# Patient Record
Sex: MALE | Race: WHITE | Employment: OTHER | URBAN - METROPOLITAN AREA
[De-identification: names, ages, dates, MRNs, and addresses within clinical notes are randomized per-mention and may not be internally consistent; named-entity substitution may affect disease eponyms.]

---

## 2017-05-30 ENCOUNTER — OFFICE VISIT (OUTPATIENT)
Dept: FAMILY MEDICINE CLINIC | Age: 72
End: 2017-05-30

## 2017-05-30 VITALS
SYSTOLIC BLOOD PRESSURE: 144 MMHG | TEMPERATURE: 98.8 F | HEART RATE: 78 BPM | HEIGHT: 69 IN | BODY MASS INDEX: 27.96 KG/M2 | DIASTOLIC BLOOD PRESSURE: 80 MMHG | OXYGEN SATURATION: 98 % | WEIGHT: 188.8 LBS | RESPIRATION RATE: 10 BRPM

## 2017-05-30 DIAGNOSIS — G89.29 CHRONIC LEFT SHOULDER PAIN: ICD-10-CM

## 2017-05-30 DIAGNOSIS — M25.512 CHRONIC LEFT SHOULDER PAIN: ICD-10-CM

## 2017-05-30 DIAGNOSIS — E78.5 HYPERLIPIDEMIA, UNSPECIFIED HYPERLIPIDEMIA TYPE: ICD-10-CM

## 2017-05-30 DIAGNOSIS — I10 ESSENTIAL HYPERTENSION: ICD-10-CM

## 2017-05-30 DIAGNOSIS — E11.9 CONTROLLED TYPE 2 DIABETES MELLITUS WITHOUT COMPLICATION, WITHOUT LONG-TERM CURRENT USE OF INSULIN (HCC): Primary | ICD-10-CM

## 2017-05-30 RX ORDER — FENOFIBRATE 160 MG/1
TABLET ORAL
Refills: 0 | COMMUNITY
Start: 2017-05-24 | End: 2017-10-11 | Stop reason: SDUPTHER

## 2017-05-30 RX ORDER — ALPRAZOLAM 0.5 MG/1
TABLET ORAL
Refills: 1 | COMMUNITY
Start: 2017-05-09 | End: 2017-06-13 | Stop reason: SDUPTHER

## 2017-05-30 RX ORDER — ATENOLOL 100 MG/1
TABLET ORAL
Refills: 1 | COMMUNITY
Start: 2017-04-21 | End: 2017-11-07 | Stop reason: SDUPTHER

## 2017-05-30 RX ORDER — EZETIMIBE AND SIMVASTATIN 10; 40 MG/1; MG/1
1 TABLET ORAL
COMMUNITY
End: 2017-07-31 | Stop reason: ALTCHOICE

## 2017-05-30 RX ORDER — GLIPIZIDE 5 MG/1
TABLET, FILM COATED, EXTENDED RELEASE ORAL
Refills: 0 | COMMUNITY
Start: 2017-04-11 | End: 2017-06-13 | Stop reason: ALTCHOICE

## 2017-05-30 RX ORDER — OXYCODONE HYDROCHLORIDE 5 MG/1
TABLET ORAL
Refills: 0 | COMMUNITY
Start: 2017-03-16 | End: 2018-02-20

## 2017-05-30 RX ORDER — FENOFIBRIC ACID 135 MG/1
CAPSULE, DELAYED RELEASE ORAL DAILY
COMMUNITY
End: 2017-06-13 | Stop reason: DRUGHIGH

## 2017-05-30 RX ORDER — GLIPIZIDE 10 MG/1
TABLET, FILM COATED, EXTENDED RELEASE ORAL
Refills: 0 | COMMUNITY
Start: 2017-05-16 | End: 2017-11-07 | Stop reason: SDUPTHER

## 2017-05-30 RX ORDER — CEFUROXIME AXETIL 500 MG/1
TABLET ORAL 2 TIMES DAILY
COMMUNITY
End: 2017-06-13 | Stop reason: ALTCHOICE

## 2017-05-30 RX ORDER — VENLAFAXINE HYDROCHLORIDE 150 MG/1
CAPSULE, EXTENDED RELEASE ORAL
Refills: 1 | COMMUNITY
Start: 2017-03-17 | End: 2017-06-12 | Stop reason: SDUPTHER

## 2017-05-30 RX ORDER — SIMVASTATIN 40 MG/1
TABLET, FILM COATED ORAL
Refills: 0 | COMMUNITY
Start: 2017-04-08 | End: 2017-10-11 | Stop reason: SDUPTHER

## 2017-05-30 RX ORDER — ESCITALOPRAM OXALATE 10 MG/1
10 TABLET ORAL DAILY
COMMUNITY
End: 2017-06-12 | Stop reason: ALTCHOICE

## 2017-05-30 RX ORDER — BENAZEPRIL HYDROCHLORIDE 40 MG/1
TABLET ORAL
Refills: 1 | COMMUNITY
Start: 2017-03-20 | End: 2018-01-19 | Stop reason: SDUPTHER

## 2017-05-30 NOTE — MR AVS SNAPSHOT
Visit Information Date & Time Provider Department Dept. Phone Encounter #  
 5/30/2017  2:30 PM Shaheen Banks MD [facility] [phone] [id number] Follow-up Instructions Return in about 3 months (around 8/30/2017), or if symptoms worsen or fail to improve. Upcoming Health Maintenance Date Due Hepatitis C Screening 1945 DTaP/Tdap/Td series (1 - Tdap) 1/3/1968 FOBT Q 1 YEAR AGE 50-75 1/3/1997 ZOSTER VACCINE AGE 60> 1/3/2007 GLAUCOMA SCREENING Q2Y 1/3/2012 Pneumococcal 65+ Low/Medium Risk (1 of 2 - PCV13) 1/3/2012 MEDICARE YEARLY EXAM 1/3/2012 INFLUENZA AGE 9 TO ADULT 8/1/2017 Allergies as of 5/30/2017  Review Complete On: 5/30/2017 By: Shaheen Banks MD  
 No Known Allergies Current Immunizations  Never Reviewed No immunizations on file. Not reviewed this visit You Were Diagnosed With   
  
 Codes Comments Controlled type 2 diabetes mellitus without complication, without long-term current use of insulin ([facility] [address]    -  Primary ICD-10-CM: E11.9 ICD-9-CM: 250.00 Hyperlipidemia, unspecified hyperlipidemia type     ICD-10-CM: E78.5 ICD-9-CM: 272.4 Essential hypertension     ICD-10-CM: I10 
ICD-9-CM: 401.9 Chronic left shoulder pain     ICD-10-CM: M25.512, G89.29 ICD-9-CM: 719.41, 338.29 Vitals BP Pulse Temp Resp Height(growth percentile) Weight(growth percentile) 144/80 (BP 1 Location: Left arm, BP Patient Position: Sitting) 78 98.8 °F (37.1 °C) (Oral) 10 5' 8.5\" (1.74 m) 188 lb 12.8 oz (85.6 kg) SpO2 BMI Smoking Status 98% 28.29 kg/m2 Current Every Day Smoker Vitals History BMI and BSA Data Body Mass Index Body Surface Area  
 28.29 kg/m 2 2.03 m 2 Preferred Pharmacy Pharmacy Name Phone [phone] [place], [address] [place] [address] AT [address] [place] [address] [phone] Your Updated Medication List  
  
 This list is accurate as of: 5/30/17  3:08 PM.  Always use your most recent med list.  
  
  
  
  
 ALPRAZolam 0.5 mg tablet Commonly known as:  Maple Cherry TK 1 T PO Q 6 H PRN  
  
 atenolol 100 mg tablet Commonly known as:  TENORMIN  
TK 1 T PO QD.  
  
 benazepril 40 mg tablet Commonly known as:  LOTENSIN  
TK 1 T PO QD.  
  
 cefUROXime 500 mg tablet Commonly known as:  CEFTIN Take  by mouth two (2) times a day. escitalopram oxalate 10 mg tablet Commonly known as:  Maudie Phoenix Take 10 mg by mouth daily. fenofibrate 160 mg tablet Commonly known as:  LOFIBRA TK 1 T PO QD WITH A MEAL  
  
 fenofibric acid 135 mg capsule Commonly known as:  TRILIPIX ER Take  by mouth daily. * glipiZIDE SR 5 mg CR tablet Commonly known as:  GLUCOTROL XL  
TK 1 T PO QD  
  
 * glipiZIDE SR 10 mg CR tablet Commonly known as:  GLUCOTROL XL  
TK 1 T PO QD  
  
 JANUMET XR 50-1,000 mg Tm24 Generic drug:  SITagliptin-metFORMIN Take  by mouth. oxyCODONE IR 5 mg immediate release tablet Commonly known as:  ROXICODONE  
TK 1 T PO  TID FOR 30 DAYS  
  
 simvastatin 40 mg tablet Commonly known as:  ZOCOR TK 1 T PO QD IN THE PRASHANT  
  
 venlafaxine- mg capsule Commonly known as:  EFFEXOR-XR TK 1 C PO QD IN THE PRASHANT WF. Vytorin 10/40 10-40 mg per tablet Generic drug:  ezetimibe-simvastatin Take 1 Tab by mouth nightly. * Notice: This list has 2 medication(s) that are the same as other medications prescribed for you. Read the directions carefully, and ask your doctor or other care provider to review them with you. We Performed the Following REFERRAL TO PAIN MANAGEMENT [PIJ373 Custom] Comments:  
 Please evaluate patient for chronic shoulder pain. Follow-up Instructions Return in about 3 months (around 8/30/2017), or if symptoms worsen or fail to improve. Referral Information Referral ID Referred By Referred To [id number] [place] Not Available Visits Status Start Date End Date 1 New Request 5/30/17 5/30/18 If your referral has a status of pending review or denied, additional information will be sent to support the outcome of this decision. Patient Instructions High Cholesterol: Care Instructions Your Care Instructions Cholesterol is a type of fat in your blood. It is needed for many body functions, such as making new cells. Cholesterol is made by your body. It also comes from food you eat. High cholesterol means that you have too much of the fat in your blood. This raises your risk of a heart attack and stroke. LDL and HDL are part of your total cholesterol. LDL is the \"bad\" cholesterol. High LDL can raise your risk for heart disease, heart attack, and stroke. HDL is the \"good\" cholesterol. It helps clear bad cholesterol from the body. High HDL is linked with a lower risk of heart disease, heart attack, and stroke. Your cholesterol levels help your doctor find out your risk for having a heart attack or stroke. You and your doctor can talk about whether you need to lower your risk and what treatment is best for you. A heart-healthy lifestyle along with medicines can help lower your cholesterol and your risk. The way you choose to lower your risk will depend on how high your risk is for heart attack and stroke. It will also depend on how you feel about taking medicines. Follow-up care is a key part of your treatment and safety. Be sure to make and go to all appointments, and call your doctor if you are having problems. It's also a good idea to know your test results and keep a list of the medicines you take. How can you care for yourself at home? · Eat a variety of foods every day. Good choices include fruits, vegetables, whole grains (like oatmeal), dried beans and peas, nuts and seeds, soy products (like tofu), and fat-free or low-fat dairy products. · Replace butter, margarine, and hydrogenated or partially hydrogenated oils with olive and canola oils. (Canola oil margarine without trans fat is fine.) · Replace red meat with fish, poultry, and soy protein (like tofu). · Limit processed and packaged foods like chips, crackers, and cookies. · Bake, broil, or steam foods. Don't jaimes them. · Be physically active. Get at least 30 minutes of exercise on most days of the week. Walking is a good choice. You also may want to do other activities, such as running, swimming, cycling, or playing tennis or team sports. · Stay at a healthy weight or lose weight by making the changes in eating and physical activity listed above. Losing just a small amount of weight, even 5 to 10 pounds, can reduce your risk for having a heart attack or stroke. · Do not smoke. When should you call for help? Watch closely for changes in your health, and be sure to contact your doctor if: 
· You need help making lifestyle changes. · You have questions about your medicine. Where can you learn more? Go to http://dariuszCrowdOpticstanley.info/. Enter G619 in the search box to learn more about \"High Cholesterol: Care Instructions. \" Current as of: January 27, 2016 Content Version: 11.2 © 4173-1453 [employer]. Care instructions adapted under license by [employer] (which disclaims liability or warranty for this information). If you have questions about a medical condition or this instruction, always ask your healthcare professional. [place] [address] any warranty or liability for your use of this information. Introducing [facility]! [employer] introduces [employer] patient portal. Now you can access parts of your medical record, email your doctor's office, and request medication refills online. 1. In your internet browser, go to [email or web address] 2. Click on the First Time User? Click Here link in the Sign In box. You will see the New Member Sign Up page. 3. Enter your [employer] Access Code exactly as it appears below. You will not need to use this code after youve completed the sign-up process. If you do not sign up before the expiration date, you must request a new code. · [employer] Access Code: [email or web address] Expires: 8/28/2017 10:24 AM 
 
4. Enter the last four digits of your Social Security Number (xxxx) and Date of Birth (mm/dd/yyyy) as indicated and click Submit. You will be taken to the next sign-up page. 5. Create a [employer] ID. This will be your [employer] login ID and cannot be changed, so think of one that is secure and easy to remember. 6. Create a [employer] password. You can change your password at any time. 7. Enter your Password Reset Question and Answer. This can be used at a later time if you forget your password. 8. Enter your e-mail address. You will receive e-mail notification when new information is available in [address] 9. Click Sign Up. You can now view and download portions of your medical record. 10. Click the Download Summary menu link to download a portable copy of your medical information. If you have questions, please visit the Frequently Asked Questions section of the [employer] website. Remember, [employer] is NOT to be used for urgent needs. For medical emergencies, dial 911. Now available from your iPhone and Android! Please provide this summary of care documentation to your next provider. If you have any questions after today's visit, please call [phone].

## 2017-05-30 NOTE — PROGRESS NOTES
Ed Ill is a 81 y.o. male  presents for establish care. No new sxs. No chest pains or SOB weakness or numbness. No Known Allergies    There is no problem list on file for this patient. Past Medical History:   Diagnosis Date    Depression     Diabetes ([address]     Hypercholesterolemia     Hypertension      Social History     Social History    Marital status:      Spouse name: N/A    Number of children: N/A    Years of education: N/A     Social History Main Topics    Smoking status: Current Every Day Smoker     Packs/day: 0.50    Smokeless tobacco: None    Alcohol use No    Drug use: None    Sexual activity: Not Asked     Other Topics Concern    None     Social History Narrative    None     Family History   Problem Relation Age of Onset    Emphysema Mother     Hypertension Father     Heart Attack Father     Emphysema Paternal Grandmother         Review of Systems   Constitutional: Negative for chills and fever. Respiratory: Negative for cough and hemoptysis. Cardiovascular: Negative for chest pain and palpitations. Gastrointestinal: Negative for constipation, diarrhea, nausea and vomiting. Neurological: Negative for headaches. Vitals:    05/30/17 1433   BP: 144/80   Pulse: 78   Resp: 10   Temp: 98.8 °F (37.1 °C)   TempSrc: Oral   SpO2: 98%   Weight: 188 lb 12.8 oz (85.6 kg)   Height: 5' 8.5\" (1.74 m)   PainSc:   5   PainLoc: Generalized       Physical Exam   Constitutional: He is oriented to person, place, and time and well-developed, well-nourished, and in no distress. Neck: Normal range of motion. Neck supple. Cardiovascular: Normal rate, regular rhythm and normal heart sounds. Pulmonary/Chest: Effort normal and breath sounds normal.   Neurological: He is alert and oriented to person, place, and time. Skin: Skin is warm and dry. Nursing note and vitals reviewed. Assessment/Plan      ICD-10-CM ICD-9-CM    1.  Controlled type 2 diabetes mellitus without complication, without long-term current use of insulin (HCC) E11.9 250.00    2. Hyperlipidemia, unspecified hyperlipidemia type E78.5 272.4    3. Essential hypertension I10 401.9    4. Chronic left shoulder pain M25.512 719.41 CANCELED: REFERRAL TO PAIN MANAGEMENT    G89.29 338.29      I have discussed the diagnosis with the patient and the intended plan of care as seen in the above orders. The patient has received an after-visit summary and questions were answered concerning future plans. I have discussed medication, side effects, and warnings with the patient in detail. The patient verbalized understanding and is in agreement with the plan of care. The patient will contact the office with any additional concerns.       Follow-up Disposition:  Return in about 3 months (around 8/30/2017), or if symptoms worsen or fail to improve.  lab results and schedule of future lab studies reviewed with patient    Mami Castillo MD

## 2017-05-30 NOTE — PATIENT INSTRUCTIONS

## 2017-06-12 RX ORDER — VENLAFAXINE HYDROCHLORIDE 150 MG/1
CAPSULE, EXTENDED RELEASE ORAL
Qty: 20 CAP | Refills: 1 | Status: SHIPPED | OUTPATIENT
Start: 2017-06-12 | End: 2017-06-12 | Stop reason: SDUPTHER

## 2017-06-12 RX ORDER — ALPRAZOLAM 0.5 MG/1
TABLET ORAL
Refills: 1 | OUTPATIENT
Start: 2017-06-12

## 2017-06-12 NOTE — TELEPHONE ENCOUNTER
This patient contacted office for the following prescriptions to be filled:    Medication requested :   Requested Prescriptions     Pending Prescriptions Disp Refills    venlafaxine-SR (EFFEXOR-XR) 150 mg capsule  1    ALPRAZolam (XANAX) 0.5 mg tablet  1     PCP: Dr. Tyler Schilling or Print: [facility]  Mail order or Local pharmacy: [phone]    Scheduled appointment if not seen by current providers in office: LOV 5/30/17

## 2017-06-13 ENCOUNTER — OFFICE VISIT (OUTPATIENT)
Dept: FAMILY MEDICINE CLINIC | Age: 72
End: 2017-06-13

## 2017-06-13 VITALS
BODY MASS INDEX: 29.18 KG/M2 | HEIGHT: 69 IN | SYSTOLIC BLOOD PRESSURE: 130 MMHG | OXYGEN SATURATION: 90 % | RESPIRATION RATE: 12 BRPM | WEIGHT: 197 LBS | DIASTOLIC BLOOD PRESSURE: 82 MMHG | HEART RATE: 86 BPM | TEMPERATURE: 98.1 F

## 2017-06-13 DIAGNOSIS — Z02.89 PAIN MANAGEMENT CONTRACT AGREEMENT: ICD-10-CM

## 2017-06-13 DIAGNOSIS — F41.9 ANXIETY: Primary | ICD-10-CM

## 2017-06-13 RX ORDER — ALPRAZOLAM 0.5 MG/1
TABLET ORAL
Qty: 60 TAB | Refills: 0 | Status: SHIPPED | OUTPATIENT
Start: 2017-06-13 | End: 2017-07-07 | Stop reason: SDUPTHER

## 2017-06-13 RX ORDER — ASPIRIN 81 MG/1
TABLET ORAL DAILY
COMMUNITY

## 2017-06-13 NOTE — PROGRESS NOTES
Yifan Mayorga is a 81 y.o. male  presents for refills of meds. No new complaints. HPI:   Patient has anxiety. Pain control:           Current : stable   0/10           Worst pain over last week        0/10           Least pain over the last week   0/10    Activities of Daily Living:            stable            Are you able to do your normal daily activities?   no    Patient Goals            Functional:  yes            Pain: none  Is patiently on narcotic medicine? If yes, which medication? Yes: Comment: xanax    Side effects: Since starting your pain medicine are you experiencing any of the following? (Examples: Constipation, fatigue, lapses in memory, depression or sexual dysfunction)     No    Known Aberrant behaviors:  No (Early refill requests, lost prescriptions, lost medicine)    Pill count: Consistent with patient's prescriptions? none    Last urine drug screen:  none    [place] Prescription Monitoring Program check performed: Yes    Any inconsistencies in patient reported and pharmacy report prescriptions? No  -------------------------------------------------------------------------------------------------------------------  If patient is not currently on narcotic medication and you are planning on initiating narcotics, [address] [place] [address] requires that you address each of the following:    History of narcotic use in the past? No    History of patient reported substance abuse? No    Social History     Social History    Marital status:      Spouse name: N/A    Number of children: N/A    Years of education: N/A     Occupational History    Not on file.      Social History Main Topics    Smoking status: Current Every Day Smoker     Packs/day: 0.50    Smokeless tobacco: Not on file    Alcohol use No    Drug use: Not on file    Sexual activity: Not on file     Other Topics Concern    Not on file     Social History Narrative       Is the Narcotic Contract Signed and Scanned into the chart? Yes      Treatment Care Team  Patient Care Team:  Armen Baez MD as PCP - [facility])    Follow up contact scheduled for 1-4 weeks: IIf not, list reason: Yes    No Known Allergies  Outpatient Prescriptions Marked as Taking for the 6/13/17 encounter (Office Visit) with Armen Baze MD   Medication Sig Dispense Refill    aspirin delayed-release 81 mg tablet Take  by mouth daily.  venlafaxine-SR (EFFEXOR-XR) 150 mg capsule TK 1 C PO QD IN THE PRASHANT WF. 20 Cap 1    atenolol (TENORMIN) 100 mg tablet TK 1 T PO QD.  1    benazepril (LOTENSIN) 40 mg tablet TK 1 T PO QD.  1    fenofibrate (LOFIBRA) 160 mg tablet TK 1 T PO QD WITH A MEAL  0    glipiZIDE SR (GLUCOTROL XL) 10 mg CR tablet TK 1 T PO QD  0    simvastatin (ZOCOR) 40 mg tablet TK 1 T PO QD IN THE PRASHANT  0    SITagliptin-metFORMIN (JANUMET XR) 50-1,000 mg TM24 Take  by mouth.  ezetimibe-simvastatin (VYTORIN 10/40) 10-40 mg per tablet Take 1 Tab by mouth nightly.        Patient Active Problem List   Diagnosis Code    Essential hypertension I10    Controlled type 2 diabetes mellitus without complication, without long-term current use of insulin ([facility]) E11.9    Hyperlipidemia E78.5    Chronic left shoulder pain M25.512, G89.29     Past Medical History:   Diagnosis Date    Depression     Diabetes ([facility] [address]     Hypercholesterolemia     Hypertension      Social History     Social History    Marital status:      Spouse name: N/A    Number of children: N/A    Years of education: N/A     Social History Main Topics    Smoking status: Current Every Day Smoker     Packs/day: 0.50    Smokeless tobacco: None    Alcohol use No    Drug use: None    Sexual activity: Not Asked     Other Topics Concern    None     Social History Narrative     Family History   Problem Relation Age of Onset    Emphysema Mother     Hypertension Father     Heart Attack Father     Emphysema Paternal Grandmother         Review of Systems Constitutional: Negative for chills and fever. Gastrointestinal: Negative for nausea and vomiting. Psychiatric/Behavioral: Negative for depression, hallucinations, memory loss, substance abuse and suicidal ideas. The patient is nervous/anxious. The patient does not have insomnia. Vitals:    06/13/17 1411   BP: 130/82   Pulse: 86   Resp: 12   Temp: 98.1 °F (36.7 °C)   TempSrc: Oral   SpO2: 90%   Weight: 197 lb (89.4 kg)   Height: 5' 8.5\" (1.74 m)   PainSc:   0 - No pain       Physical Exam   Constitutional: He is oriented to person, place, and time and well-developed, well-nourished, and in no distress. Cardiovascular: Normal rate and regular rhythm. Neurological: He is alert and oriented to person, place, and time. Gait normal.   Skin: Skin is warm and dry. Psychiatric: Mood, memory, affect and judgment normal.   Nursing note and vitals reviewed. Assessment/Plan      ICD-10-CM ICD-9-CM    1. Anxiety F41.9 300.00 ALPRAZolam (XANAX) 0.5 mg tablet   2.  Pain management contract agreement Z02.89 V68.89      Follow-up Disposition: Not on File  lab results and schedule of future lab studies reviewed with patient    Sonja Bennett MD

## 2017-06-13 NOTE — MR AVS SNAPSHOT
Visit Information Date & Time Provider Department Dept. Phone Encounter #  
 6/13/2017  2:00 PM Chance Cazares MD [facility] [phone] [id number] Follow-up Instructions Return in about 1 month (around 7/13/2017), or if symptoms worsen or fail to improve. Your Appointments 8/29/2017  1:30 PM  
Follow Up with Chance Cazares MD  
[facility] [address]) Appt Note: 3mo f/u  
 [address] [facility] [address] [place] [address]  
[phone]  
  
   
 [facility] [address] [place] [address] Upcoming Health Maintenance Date Due Hepatitis C Screening 1945 HEMOGLOBIN A1C Q6M 1945 LIPID PANEL Q1 1945 FOOT EXAM Q1 1/3/1957 MICROALBUMIN Q1 1/3/1957 EYE EXAM RETINAL OR DILATED Q1 1/3/1957 DTaP/Tdap/Td series (1 - Tdap) 1/3/1968 FOBT Q 1 YEAR AGE 50-75 1/3/1997 ZOSTER VACCINE AGE 60> 1/3/2007 GLAUCOMA SCREENING Q2Y 1/3/2012 Pneumococcal 65+ Low/Medium Risk (1 of 2 - PCV13) 1/3/2012 MEDICARE YEARLY EXAM 1/3/2012 INFLUENZA AGE 9 TO ADULT 8/1/2017 Allergies as of 6/13/2017  Review Complete On: 6/13/2017 By: Chance Cazares MD  
 No Known Allergies Current Immunizations  Never Reviewed No immunizations on file. Not reviewed this visit You Were Diagnosed With   
  
 Codes Comments Anxiety    -  Primary ICD-10-CM: F41.9 ICD-9-CM: 300.00 Pain management contract agreement     ICD-10-CM: Z02.89 ICD-9-CM: V68.89 Vitals BP Pulse Temp Resp Height(growth percentile) Weight(growth percentile) 130/82 (BP 1 Location: Right arm, BP Patient Position: Sitting) 86 98.1 °F (36.7 °C) (Oral) 12 5' 8.5\" (1.74 m) 197 lb (89.4 kg) SpO2 BMI Smoking Status 90% 29.52 kg/m2 Current Every Day Smoker Vitals History BMI and BSA Data  Body Mass Index Body Surface Area  
 29.52 kg/m 2 2.08 m 2  
  
  
 Preferred Pharmacy Pharmacy Name Phone [facility] [id number] - [place], [address] [facility] AT [address] [place] [address] [phone] Your Updated Medication List  
  
   
This list is accurate as of: 6/13/17  2:31 PM.  Always use your most recent med list.  
  
  
  
  
 ALPRAZolam 0.5 mg tablet Commonly known as:  Toy Moment TK 1 T PO Q 6 H PRN  Indications: anxiety  
  
 aspirin delayed-release 81 mg tablet Take  by mouth daily. atenolol 100 mg tablet Commonly known as:  TENORMIN  
TK 1 T PO QD.  
  
 benazepril 40 mg tablet Commonly known as:  LOTENSIN  
TK 1 T PO QD.  
  
 fenofibrate 160 mg tablet Commonly known as:  LOFIBRA TK 1 T PO QD WITH A MEAL  
  
 glipiZIDE SR 10 mg CR tablet Commonly known as:  GLUCOTROL XL  
TK 1 T PO QD  
  
 JANUMET XR 50-1,000 mg Tm24 Generic drug:  SITagliptin-metFORMIN Take  by mouth. oxyCODONE IR 5 mg immediate release tablet Commonly known as:  ROXICODONE  
TK 1 T PO  TID FOR 30 DAYS  
  
 simvastatin 40 mg tablet Commonly known as:  ZOCOR TK 1 T PO QD IN THE PRASHANT  
  
 venlafaxine- mg capsule Commonly known as:  EFFEXOR-XR TK 1 C PO QD IN THE PRASHANT WF. Vytorin 10/40 10-40 mg per tablet Generic drug:  ezetimibe-simvastatin Take 1 Tab by mouth nightly. Prescriptions Printed Refills ALPRAZolam (XANAX) 0.5 mg tablet 0 Sig: TK 1 T PO Q 6 H PRN  Indications: anxiety Class: Print Follow-up Instructions Return in about 1 month (around 7/13/2017), or if symptoms worsen or fail to improve. Patient Instructions Anxiety Disorder: Care Instructions Your Care Instructions Anxiety is a normal reaction to stress. Difficult situations can cause you to have symptoms such as sweaty palms and a nervous feeling. In an anxiety disorder, the symptoms are far more severe.  Constant worry, muscle tension, trouble sleeping, nausea and diarrhea, and other symptoms can make normal daily activities difficult or impossible. These symptoms may occur for no reason, and they can affect your work, school, or social life. Medicines, counseling, and self-care can all help. Follow-up care is a key part of your treatment and safety. Be sure to make and go to all appointments, and call your doctor if you are having problems. It's also a good idea to know your test results and keep a list of the medicines you take. How can you care for yourself at home? · Take medicines exactly as directed. Call your doctor if you think you are having a problem with your medicine. · Go to your counseling sessions and follow-up appointments. · Recognize and accept your anxiety. Then, when you are in a situation that makes you anxious, say to yourself, \"This is not an emergency. I feel uncomfortable, but I am not in danger. I can keep going even if I feel anxious. \" · Be kind to your body: ¨ Relieve tension with exercise or a massage. ¨ Get enough rest. 
¨ Avoid alcohol, caffeine, nicotine, and illegal drugs. They can increase your anxiety level and cause sleep problems. ¨ Learn and do relaxation techniques. See below for more about these techniques. · Engage your mind. Get out and do something you enjoy. Go to a funny movie, or take a walk or hike. Plan your day. Having too much or too little to do can make you anxious. · Keep a record of your symptoms. Discuss your fears with a good friend or family member, or join a support group for people with similar problems. Talking to others sometimes relieves stress. · Get involved in social groups, or volunteer to help others. Being alone sometimes makes things seem worse than they are. · Get at least 30 minutes of exercise on most days of the week to relieve stress. Walking is a good choice.  You also may want to do other activities, such as running, swimming, cycling, or playing tennis or team sports. Relaxation techniques Do relaxation exercises 10 to 20 minutes a day. You can play soothing, relaxing music while you do them, if you wish. · Tell others in your house that you are going to do your relaxation exercises. Ask them not to disturb you. · Find a comfortable place, away from all distractions and noise. · Lie down on your back, or sit with your back straight. · Focus on your breathing. Make it slow and steady. · Breathe in through your nose. Breathe out through either your nose or mouth. · Breathe deeply, filling up the area between your navel and your rib cage. Breathe so that your belly goes up and down. · Do not hold your breath. · Breathe like this for 5 to 10 minutes. Notice the feeling of calmness throughout your whole body. As you continue to breathe slowly and deeply, relax by doing the following for another 5 to 10 minutes: · Tighten and relax each muscle group in your body. You can begin at your toes and work your way up to your head. · Imagine your muscle groups relaxing and becoming heavy. · Empty your mind of all thoughts. · Let yourself relax more and more deeply. · Become aware of the state of calmness that surrounds you. · When your relaxation time is over, you can bring yourself back to alertness by moving your fingers and toes and then your hands and feet and then stretching and moving your entire body. Sometimes people fall asleep during relaxation, but they usually wake up shortly afterward. · Always give yourself time to return to full alertness before you drive a car or do anything that might cause an accident if you are not fully alert. Never play a relaxation tape while you drive a car. When should you call for help? Call 911 anytime you think you may need emergency care. For example, call if: 
· You feel you cannot stop from hurting yourself or someone else. Keep the numbers for these national suicide hotlines: 7-480-185-TALK (9-977.546.9960) and 7-496-ABMUXDL (9-449.775.4128). If you or someone you know talks about suicide or feeling hopeless, get help right away. Watch closely for changes in your health, and be sure to contact your doctor if: 
· You have anxiety or fear that affects your life. · You have symptoms of anxiety that are new or different from those you had before. Where can you learn more? Go to http://dariusz-stanley.info/. Enter P754 in the search box to learn more about \"Anxiety Disorder: Care Instructions. \" Current as of: July 26, 2016 Content Version: 11.2 © 1856-2604 [employer]. Care instructions adapted under license by NewsiT (which disclaims liability or warranty for this information). If you have questions about a medical condition or this instruction, always ask your healthcare professional. [place] [address] any warranty or liability for your use of this information. Introducing [facility]! Dear Shar Puentes: Thank you for requesting a Complix account. Our records indicate that you already have an active Complix account. You can access your account anytime at [email or web address] Did you know that you can access your hospital and ER discharge instructions at any time in Complix? You can also review all of your test results from your hospital stay or ER visit. Additional Information If you have questions, please visit the Frequently Asked Questions section of the Complix website at [email or web address] Remember, Complix is NOT to be used for urgent needs. For medical emergencies, dial 911. Now available from your iPhone and Android! Please provide this summary of care documentation to your next provider. Your primary care clinician is listed as [address].  If you have any questions after today's visit, please call [phone].

## 2017-06-13 NOTE — PROGRESS NOTES
Patient here for refills on his Xanax. 1. Have you been to the ER, urgent care clinic since your last visit? Hospitalized since your last visit? No    2. Have you seen or consulted any other health care providers outside of the [facility] [place] [facility]\"" since your last visit? Include any pap smears or colon screening.  No

## 2017-06-21 RX ORDER — VENLAFAXINE HYDROCHLORIDE 150 MG/1
CAPSULE, EXTENDED RELEASE ORAL
Qty: 90 CAP | Refills: 1 | Status: SHIPPED | OUTPATIENT
Start: 2017-06-21 | End: 2017-09-05 | Stop reason: SDUPTHER

## 2017-07-05 DIAGNOSIS — F41.9 ANXIETY: ICD-10-CM

## 2017-07-05 RX ORDER — ALPRAZOLAM 0.5 MG/1
TABLET ORAL
Refills: 0 | OUTPATIENT
Start: 2017-07-05

## 2017-07-05 NOTE — TELEPHONE ENCOUNTER
And it should say three times a day if needed with enough pills. .. 90 per the patient. Please contact to verify.

## 2017-07-05 NOTE — TELEPHONE ENCOUNTER
This patient contacted office for the following prescriptions to be filled:    Medication requested :   Requested Prescriptions     Pending Prescriptions Disp Refills    ALPRAZolam (XANAX) 0.5 mg tablet 60 Tab 0     Sig: TK 1 T PO Q 6 H PRN  Indications: anxiety       PCP: Dr. Salome Moreno MD  Pharmacy or Print: Print  Mail order or Local pharmacy: Local    Scheduled appointment if not seen by current providers in office: last appointment was 6/13/17, scheduled to be seen on 7/13/17.

## 2017-07-06 NOTE — TELEPHONE ENCOUNTER
Pt came into office today. He states that he only has 2 days left. When he was at his last appt he states that he spoke with dr Johnny Serrano and told him that he takes the pills '2-3 times a day' and two doctors have agreed that he 'should be on it for panic attacks. And we don't want me to have one of those. I know I don't want to have one'. Currently the script is only for 2 days. He needs a script to be written for 10 pills to last him until he can fill it next week and then a script to reflect his 3 pills a day. I let patient know that dr Johnny Serrano had left the office for lunch. I could send a message to dr Johnny Serrano and give him a call once he responds.

## 2017-07-07 ENCOUNTER — OFFICE VISIT (OUTPATIENT)
Dept: FAMILY MEDICINE CLINIC | Age: 72
End: 2017-07-07

## 2017-07-07 VITALS
DIASTOLIC BLOOD PRESSURE: 82 MMHG | TEMPERATURE: 97.8 F | WEIGHT: 199.2 LBS | OXYGEN SATURATION: 98 % | RESPIRATION RATE: 12 BRPM | HEART RATE: 78 BPM | SYSTOLIC BLOOD PRESSURE: 128 MMHG | HEIGHT: 69 IN | BODY MASS INDEX: 29.51 KG/M2

## 2017-07-07 DIAGNOSIS — F32.A ANXIETY AND DEPRESSION: Primary | ICD-10-CM

## 2017-07-07 DIAGNOSIS — E11.9 CONTROLLED TYPE 2 DIABETES MELLITUS WITHOUT COMPLICATION, WITHOUT LONG-TERM CURRENT USE OF INSULIN (HCC): ICD-10-CM

## 2017-07-07 DIAGNOSIS — F41.9 ANXIETY AND DEPRESSION: Primary | ICD-10-CM

## 2017-07-07 RX ORDER — METFORMIN HYDROCHLORIDE 500 MG/1
TABLET ORAL 2 TIMES DAILY WITH MEALS
COMMUNITY
End: 2017-07-17 | Stop reason: SDUPTHER

## 2017-07-07 RX ORDER — ALPRAZOLAM 0.5 MG/1
TABLET ORAL
Qty: 90 TAB | Refills: 0 | Status: SHIPPED | OUTPATIENT
Start: 2017-07-07 | End: 2017-08-06 | Stop reason: SDUPTHER

## 2017-07-07 NOTE — MR AVS SNAPSHOT
Visit Information Date & Time Provider Department Dept. Phone Encounter #  
 7/7/2017  1:15 PM Leonie Santana MD [facility] [phone] [id number] Your Appointments 7/13/2017  1:30 PM  
Follow Up with Leonie Santana MD  
[facility]) Appt Note: 1mo f/u for anxiety/pain mgmt [facility] [address] [facility] [address] [phone]  
  
   
 [address] [address]  
  
    
 8/29/2017  1:30 PM  
Follow Up with Leonie Santana MD  
[facility] ([facility]) Appt Note: 3mo f/u  
 [id number] [facility] [address] [place] [phone] Upcoming Health Maintenance Date Due Hepatitis C Screening 1945 HEMOGLOBIN A1C Q6M 1945 LIPID PANEL Q1 1947 FOOT EXAM Q1 1/3/1957 MICROALBUMIN Q1 1/3/1957 EYE EXAM RETINAL OR DILATED Q1 1/3/1957 DTaP/Tdap/Td series (1 - Tdap) 1/3/1968 FOBT Q 1 YEAR AGE 50-75 1/3/1997 ZOSTER VACCINE AGE 60> 1/3/2007 GLAUCOMA SCREENING Q2Y 1/3/2012 Pneumococcal 65+ Low/Medium Risk (1 of 2 - PCV13) 1/3/2012 MEDICARE YEARLY EXAM 1/3/2012 INFLUENZA AGE 9 TO ADULT 8/1/2017 Allergies as of 7/7/2017  Review Complete On: 7/7/2017 By: Leonie Santana MD  
 No Known Allergies Current Immunizations  Never Reviewed No immunizations on file. Not reviewed this visit You Were Diagnosed With   
  
 Codes Comments Anxiety and depression    -  Primary ICD-10-CM: F41.9, F32.9 ICD-9-CM: 300.00, 311 Vitals BP Pulse Temp Resp Height(growth percentile) Weight(growth percentile) 128/82 (BP 1 Location: Left arm, BP Patient Position: Sitting) 78 97.8 °F (36.6 °C) (Oral) 12 5' 8.5\" (1.74 m) 199 lb 3.2 oz (90.4 kg) SpO2 BMI Smoking Status 98% 29.85 kg/m2 Current Every Day Smoker BMI and BSA Data Body Mass Index Body Surface Area  
 29.85 kg/m 2 2.09 m 2 Preferred Pharmacy Pharmacy Name Phone [phone] - [place], [address] [place] [address] AT [address] [place] [address] [phone] Your Updated Medication List  
  
   
This list is accurate as of: 7/7/17  1:44 PM.  Always use your most recent med list.  
  
  
  
  
 ALPRAZolam 0.5 mg tablet Commonly known as:  Sipmson Sumeet TK 1 T PO Q 6 H PRN  Indications: anxiety  
  
 aspirin delayed-release 81 mg tablet Take  by mouth daily. atenolol 100 mg tablet Commonly known as:  TENORMIN  
TK 1 T PO QD.  
  
 benazepril 40 mg tablet Commonly known as:  LOTENSIN  
TK 1 T PO QD.  
  
 fenofibrate 160 mg tablet Commonly known as:  LOFIBRA TK 1 T PO QD WITH A MEAL  
  
 glipiZIDE SR 10 mg CR tablet Commonly known as:  GLUCOTROL XL  
TK 1 T PO QD  
  
 JANUMET XR 50-1,000 mg Tm24 Generic drug:  SITagliptin-metFORMIN Take  by mouth.  
  
 metFORMIN 500 mg tablet Commonly known as:  GLUCOPHAGE Take  by mouth two (2) times daily (with meals). oxyCODONE IR 5 mg immediate release tablet Commonly known as:  ROXICODONE  
TK 1 T PO  TID FOR 30 DAYS  
  
 simvastatin 40 mg tablet Commonly known as:  ZOCOR TK 1 T PO QD IN THE PRASHANT  
  
 venlafaxine- mg capsule Commonly known as:  EFFEXOR-XR  
TAKE 1 CAPSULE BY MOUTH EVERY DAY IN THE EVENING WITH FOOD Vytorin 10/40 10-40 mg per tablet Generic drug:  ezetimibe-simvastatin Take 1 Tab by mouth nightly. Prescriptions Printed Refills ALPRAZolam (XANAX) 0.5 mg tablet 0 Sig: TK 1 T PO Q 6 H PRN  Indications: anxiety Class: Print We Performed the Following REFERRAL TO PSYCHOLOGY [SDN09 Custom] Comments:  
 Please evaluate patient for chronic anxiety and depression Referral Information  Referral ID Referred By Referred To  
  
 [id number] Janice Azul, [address] [facility],[address], [place]   
 [address] [facility], [address] Phone: [phone] Fax: [phone] Visits Status Start Date End Date 1 New Request 7/7/17 7/7/18 If your referral has a status of pending review or denied, additional information will be sent to support the outcome of this decision. Patient Instructions Anxiety Disorder: Care Instructions Your Care Instructions Anxiety is a normal reaction to stress. Difficult situations can cause you to have symptoms such as sweaty palms and a nervous feeling. In an anxiety disorder, the symptoms are far more severe. Constant worry, muscle tension, trouble sleeping, nausea and diarrhea, and other symptoms can make normal daily activities difficult or impossible. These symptoms may occur for no reason, and they can affect your work, school, or social life. Medicines, counseling, and self-care can all help. Follow-up care is a key part of your treatment and safety. Be sure to make and go to all appointments, and call your doctor if you are having problems. It's also a good idea to know your test results and keep a list of the medicines you take. How can you care for yourself at home? · Take medicines exactly as directed. Call your doctor if you think you are having a problem with your medicine. · Go to your counseling sessions and follow-up appointments. · Recognize and accept your anxiety. Then, when you are in a situation that makes you anxious, say to yourself, \"This is not an emergency. I feel uncomfortable, but I am not in danger. I can keep going even if I feel anxious. \" · Be kind to your body: ¨ Relieve tension with exercise or a massage. ¨ Get enough rest. 
¨ Avoid alcohol, caffeine, nicotine, and illegal drugs. They can increase your anxiety level and cause sleep problems. ¨ Learn and do relaxation techniques. See below for more about these techniques. · Engage your mind. Get out and do something you enjoy.  Go to a funny movie, or take a walk or hike. Plan your day. Having too much or too little to do can make you anxious. · Keep a record of your symptoms. Discuss your fears with a good friend or family member, or join a support group for people with similar problems. Talking to others sometimes relieves stress. · Get involved in social groups, or volunteer to help others. Being alone sometimes makes things seem worse than they are. · Get at least 30 minutes of exercise on most days of the week to relieve stress. Walking is a good choice. You also may want to do other activities, such as running, swimming, cycling, or playing tennis or team sports. Relaxation techniques Do relaxation exercises 10 to 20 minutes a day. You can play soothing, relaxing music while you do them, if you wish. · Tell others in your house that you are going to do your relaxation exercises. Ask them not to disturb you. · Find a comfortable place, away from all distractions and noise. · Lie down on your back, or sit with your back straight. · Focus on your breathing. Make it slow and steady. · Breathe in through your nose. Breathe out through either your nose or mouth. · Breathe deeply, filling up the area between your navel and your rib cage. Breathe so that your belly goes up and down. · Do not hold your breath. · Breathe like this for 5 to 10 minutes. Notice the feeling of calmness throughout your whole body. As you continue to breathe slowly and deeply, relax by doing the following for another 5 to 10 minutes: · Tighten and relax each muscle group in your body. You can begin at your toes and work your way up to your head. · Imagine your muscle groups relaxing and becoming heavy. · Empty your mind of all thoughts. · Let yourself relax more and more deeply. · Become aware of the state of calmness that surrounds you.  
· When your relaxation time is over, you can bring yourself back to alertness by moving your fingers and toes and then your hands and feet and then stretching and moving your entire body. Sometimes people fall asleep during relaxation, but they usually wake up shortly afterward. · Always give yourself time to return to full alertness before you drive a car or do anything that might cause an accident if you are not fully alert. Never play a relaxation tape while you drive a car. When should you call for help? Call 911 anytime you think you may need emergency care. For example, call if: 
· You feel you cannot stop from hurting yourself or someone else. Keep the numbers for these national suicide hotlines: 7-420-898-TALK (8-189.432.8485) and 6-021-BATECMZ (7-193.629.2368). If you or someone you know talks about suicide or feeling hopeless, get help right away. Watch closely for changes in your health, and be sure to contact your doctor if: 
· You have anxiety or fear that affects your life. · You have symptoms of anxiety that are new or different from those you had before. Where can you learn more? Go to http://dariuszSistemicstanley.info/. Enter P754 in the search box to learn more about \"Anxiety Disorder: Care Instructions. \" Current as of: July 26, 2016 Content Version: 11.3 © 5240-6026 LDL Technology, [employer]. Care instructions adapted under license by [employer] (which disclaims liability or warranty for this information). If you have questions about a medical condition or this instruction, always ask your healthcare professional. [place] [address] any warranty or liability for your use of this information. Introducing \[facility] [place] [facility]\"" & HEALTH SERVICES! Dear April Salamanca: Thank you for requesting a TapInko account. Our records indicate that you already have an active TapInko account. You can access your account anytime at [email or web address] Did you know that you can access your hospital and ER discharge instructions at any time in Nimble Apps Limited? You can also review all of your test results from your hospital stay or ER visit. Additional Information If you have questions, please visit the Frequently Asked Questions section of the [employer] Apps [employer] website at [email or web address] Remember, [employer]ble Apps Limited is NOT to be used for urgent needs. For medical emergencies, dial 911. Now available from your iPhone and Android! Please provide this summary of care documentation to your next provider. Your primary care clinician is listed as [id number] [facility]. If you have any questions after today's visit, please call [phone].

## 2017-07-07 NOTE — PATIENT INSTRUCTIONS

## 2017-07-07 NOTE — PROGRESS NOTES
Manav Castaneda is a 81 y.o. male  presents for anxiety and depression. No sxs of suicide or homicide. No Known Allergies  Outpatient Prescriptions Marked as Taking for the 7/7/17 encounter (Office Visit) with Phyllis Leahy MD   Medication Sig Dispense Refill    metFORMIN (GLUCOPHAGE) 500 mg tablet Take  by mouth two (2) times daily (with meals).  venlafaxine-SR (EFFEXOR-XR) 150 mg capsule TAKE 1 CAPSULE BY MOUTH EVERY DAY IN THE EVENING WITH FOOD 90 Cap 1    aspirin delayed-release 81 mg tablet Take  by mouth daily.       ALPRAZolam (XANAX) 0.5 mg tablet TK 1 T PO Q 6 H PRN  Indications: anxiety 60 Tab 0    atenolol (TENORMIN) 100 mg tablet TK 1 T PO QD.  1    benazepril (LOTENSIN) 40 mg tablet TK 1 T PO QD.  1    fenofibrate (LOFIBRA) 160 mg tablet TK 1 T PO QD WITH A MEAL  0    glipiZIDE SR (GLUCOTROL XL) 10 mg CR tablet TK 1 T PO QD  0    simvastatin (ZOCOR) 40 mg tablet TK 1 T PO QD IN THE PRASHANT  0     Patient Active Problem List   Diagnosis Code    Essential hypertension I10    Controlled type 2 diabetes mellitus without complication, without long-term current use of insulin (HCC) E11.9    Hyperlipidemia E78.5    Chronic left shoulder pain M25.512, G89.29    Pain management contract agreement Z02.89     Past Medical History:   Diagnosis Date    Depression     Diabetes ([facility] [address]     Hypercholesterolemia     Hypertension      Social History     Social History    Marital status:      Spouse name: N/A    Number of children: N/A    Years of education: N/A     Social History Main Topics    Smoking status: Current Every Day Smoker     Packs/day: 0.50    Smokeless tobacco: Not on file    Alcohol use No    Drug use: Not on file    Sexual activity: Not on file     Other Topics Concern    Not on file     Social History Narrative     Family History   Problem Relation Age of Onset    Emphysema Mother     Hypertension Father     Heart Attack Father     Emphysema Paternal Grandmother Review of Systems   Psychiatric/Behavioral: Positive for depression. Negative for hallucinations, memory loss, substance abuse and suicidal ideas. The patient is nervous/anxious. The patient does not have insomnia. Physical Exam   Psychiatric: Mood, memory, affect and judgment normal.   Nursing note and vitals reviewed. Assessment/Plan      ICD-10-CM ICD-9-CM    1. Anxiety and depression F41.9 300.00 ALPRAZolam (XANAX) 0.5 mg tablet    F32.9 311 REFERRAL TO PSYCHOLOGY   2. Controlled type 2 diabetes mellitus without complication, without long-term current use of insulin (HCC) E11.9 250.00 HEMOGLOBIN A1C WITH EAG      METABOLIC PANEL, COMPREHENSIVE      LIPID PANEL     I have discussed the diagnosis with the patient and the intended plan of care as seen in the above orders. The patient has received an after-visit summary and questions were answered concerning future plans. I have discussed medication, side effects, and warnings with the patient in detail. The patient verbalized understanding and is in agreement with the plan of care. The patient will contact the office with any additional concerns. Follow-up Disposition:  Return in about 3 months (around 10/7/2017).   lab results and schedule of future lab studies reviewed with patient    Erik Eduardo MD

## 2017-07-07 NOTE — PROGRESS NOTES
Chief Complaint   Patient presents with    Follow-up     medication refill     1. Have you been to the ER, urgent care clinic since your last visit? Hospitalized since your last visit? No    2. Have you seen or consulted any other health care providers outside of the [address] [place] [address] since your last visit? Include any pap smears or colon screening.  No

## 2017-07-17 RX ORDER — METFORMIN HYDROCHLORIDE 500 MG/1
500 TABLET ORAL 2 TIMES DAILY WITH MEALS
Qty: 60 TAB | Refills: 0 | Status: SHIPPED | OUTPATIENT
Start: 2017-07-17 | End: 2017-07-17 | Stop reason: SDUPTHER

## 2017-07-18 RX ORDER — METFORMIN HYDROCHLORIDE 500 MG/1
TABLET ORAL
Qty: 180 TAB | Refills: 0 | Status: SHIPPED | OUTPATIENT
Start: 2017-07-18 | End: 2017-07-31 | Stop reason: SDUPTHER

## 2017-07-31 ENCOUNTER — TELEPHONE (OUTPATIENT)
Dept: FAMILY MEDICINE CLINIC | Age: 72
End: 2017-07-31

## 2017-07-31 DIAGNOSIS — E78.5 HYPERLIPIDEMIA, UNSPECIFIED HYPERLIPIDEMIA TYPE: Primary | ICD-10-CM

## 2017-07-31 DIAGNOSIS — E11.9 CONTROLLED TYPE 2 DIABETES MELLITUS WITHOUT COMPLICATION, WITHOUT LONG-TERM CURRENT USE OF INSULIN (HCC): ICD-10-CM

## 2017-07-31 RX ORDER — SIMVASTATIN 40 MG/1
40 TABLET, FILM COATED ORAL
Qty: 90 TAB | Refills: 0 | Status: SHIPPED | OUTPATIENT
Start: 2017-07-31 | End: 2017-09-25 | Stop reason: SDUPTHER

## 2017-07-31 RX ORDER — METFORMIN HYDROCHLORIDE 500 MG/1
TABLET ORAL
Qty: 180 TAB | Refills: 0 | Status: SHIPPED | OUTPATIENT
Start: 2017-07-31 | End: 2018-02-26 | Stop reason: SDUPTHER

## 2017-07-31 NOTE — TELEPHONE ENCOUNTER
Spoke with pharmacy to correct quantity on patient's Metformin patient takes 2 tabs twice a day this has been updated with pharmacy however his insurance will not pay for this yet as he just picked up a prescription for this medication, called patient to let him know about this no answer left message asking patient to call me back.

## 2017-08-06 DIAGNOSIS — F41.9 ANXIETY AND DEPRESSION: ICD-10-CM

## 2017-08-06 DIAGNOSIS — F32.A ANXIETY AND DEPRESSION: ICD-10-CM

## 2017-08-07 RX ORDER — ALPRAZOLAM 0.5 MG/1
TABLET ORAL
Qty: 90 TAB | Refills: 0 | Status: SHIPPED | OUTPATIENT
Start: 2017-08-07 | End: 2017-09-05 | Stop reason: SDUPTHER

## 2017-08-07 NOTE — TELEPHONE ENCOUNTER
This patient contacted office for the following prescriptions to be filled:    Medication requested :   Requested Prescriptions     Pending Prescriptions Disp Refills    ALPRAZolam (XANAX) 0.5 mg tablet 90 Tab 0     Sig: TK 1 T PO Q 6 H PRN  Indications: anxiety       PCP: Dr. Josey Mathis MD  Pharmacy or Print: Print  Mail order or Local pharmacy: Local Pharmacy     Scheduled appointment if not seen by current providers in office: Last appointment 7-7-17

## 2017-08-07 NOTE — TELEPHONE ENCOUNTER
From: Omar Srivastava  To: Ernestina Fernandes MD  Sent: 8/6/2017 3:20 PM EDT  Subject: Medication Renewal Request    Original authorizing provider: MD Omar Moreland would like a refill of the following medications:  ALPRAZolam Rita Dhillon) 0.5 mg tablet Ernestina Fernandes MD]    Preferred pharmacy: [facility] [id number] AT [address] [place] [address] ([facility]) & MAIN    Comment:  I will  the prescription at the office when it is ready. Thank you. Omar Srivastava [phone] [email or web address]

## 2017-09-05 DIAGNOSIS — E11.9 CONTROLLED TYPE 2 DIABETES MELLITUS WITHOUT COMPLICATION, WITHOUT LONG-TERM CURRENT USE OF INSULIN (HCC): ICD-10-CM

## 2017-09-05 DIAGNOSIS — F41.9 ANXIETY AND DEPRESSION: ICD-10-CM

## 2017-09-05 DIAGNOSIS — F32.A ANXIETY AND DEPRESSION: ICD-10-CM

## 2017-09-05 RX ORDER — METFORMIN HYDROCHLORIDE 500 MG/1
TABLET ORAL
Qty: 180 TAB | Refills: 0 | Status: SHIPPED | OUTPATIENT
Start: 2017-09-05 | End: 2017-09-05 | Stop reason: SDUPTHER

## 2017-09-05 RX ORDER — VENLAFAXINE HYDROCHLORIDE 150 MG/1
150 CAPSULE, EXTENDED RELEASE ORAL DAILY
Qty: 90 CAP | Refills: 1 | Status: SHIPPED | OUTPATIENT
Start: 2017-09-05 | End: 2018-03-20

## 2017-09-05 RX ORDER — ALPRAZOLAM 0.5 MG/1
TABLET ORAL
Qty: 90 TAB | Refills: 0 | Status: SHIPPED | OUTPATIENT
Start: 2017-09-05 | End: 2017-09-29 | Stop reason: SDUPTHER

## 2017-09-05 NOTE — TELEPHONE ENCOUNTER
From: Batsheva Estrada  To: Uche Carrillo MD  Sent: 9/5/2017 8:51 AM EDT  Subject: Medication Renewal Request    Original authorizing provider: MD Batsheva Mcclain would like a refill of the following medications:  venlafaxine-SR (EFFEXOR-XR) 150 mg capsule Uche Carrillo MD]  metFORMIN (GLUCOPHAGE) 500 mg tablet Uche Carrillo MD]  ALPRAZolam Talisha [place]) 0.5 mg tablet Uche Carrillo MD]    Pref[facility]: [facility]  AT [address] [place] [address] ([facility]) & MAIN    Comment:  I will  the prescription for ALPRAZOLAM at the office when it is available.

## 2017-09-06 RX ORDER — METFORMIN HYDROCHLORIDE 500 MG/1
TABLET ORAL
Qty: 360 TAB | Refills: 0 | Status: SHIPPED | OUTPATIENT
Start: 2017-09-06 | End: 2018-07-10 | Stop reason: SDUPTHER

## 2017-09-06 RX ORDER — VENLAFAXINE HYDROCHLORIDE 150 MG/1
CAPSULE, EXTENDED RELEASE ORAL
Qty: 90 CAP | Refills: 0 | Status: SHIPPED | OUTPATIENT
Start: 2017-09-06 | End: 2018-02-20

## 2017-09-25 DIAGNOSIS — E78.5 HYPERLIPIDEMIA, UNSPECIFIED HYPERLIPIDEMIA TYPE: ICD-10-CM

## 2017-09-25 RX ORDER — SIMVASTATIN 40 MG/1
40 TABLET, FILM COATED ORAL
Qty: 90 TAB | Refills: 0 | Status: SHIPPED | OUTPATIENT
Start: 2017-09-25 | End: 2017-11-07 | Stop reason: SDUPTHER

## 2017-09-29 DIAGNOSIS — F41.9 ANXIETY AND DEPRESSION: ICD-10-CM

## 2017-09-29 DIAGNOSIS — F32.A ANXIETY AND DEPRESSION: ICD-10-CM

## 2017-09-29 RX ORDER — ALPRAZOLAM 0.5 MG/1
TABLET ORAL
Qty: 90 TAB | Refills: 0 | Status: SHIPPED | OUTPATIENT
Start: 2017-09-29 | End: 2017-11-07 | Stop reason: SDUPTHER

## 2017-10-11 ENCOUNTER — OFFICE VISIT (OUTPATIENT)
Dept: FAMILY MEDICINE CLINIC | Age: 72
End: 2017-10-11

## 2017-10-11 ENCOUNTER — HOSPITAL ENCOUNTER (OUTPATIENT)
Dept: LAB | Age: 72
Discharge: HOME | End: 2017-10-11
Payer: MEDICARE

## 2017-10-11 VITALS
TEMPERATURE: 97.9 F | OXYGEN SATURATION: 98 % | HEART RATE: 77 BPM | HEIGHT: 69 IN | RESPIRATION RATE: 12 BRPM | DIASTOLIC BLOOD PRESSURE: 70 MMHG | SYSTOLIC BLOOD PRESSURE: 132 MMHG | BODY MASS INDEX: 29.56 KG/M2 | WEIGHT: 199.6 LBS

## 2017-10-11 DIAGNOSIS — E78.00 PURE HYPERCHOLESTEROLEMIA: ICD-10-CM

## 2017-10-11 DIAGNOSIS — Z71.89 ACP (ADVANCE CARE PLANNING): ICD-10-CM

## 2017-10-11 DIAGNOSIS — Z12.5 PROSTATE CANCER SCREENING: ICD-10-CM

## 2017-10-11 DIAGNOSIS — Z11.59 NEED FOR HEPATITIS C SCREENING TEST: ICD-10-CM

## 2017-10-11 DIAGNOSIS — E11.9 CONTROLLED TYPE 2 DIABETES MELLITUS WITHOUT COMPLICATION, WITHOUT LONG-TERM CURRENT USE OF INSULIN (HCC): Primary | ICD-10-CM

## 2017-10-11 DIAGNOSIS — Z12.11 SCREEN FOR COLON CANCER: ICD-10-CM

## 2017-10-11 DIAGNOSIS — E11.9 CONTROLLED TYPE 2 DIABETES MELLITUS WITHOUT COMPLICATION, WITHOUT LONG-TERM CURRENT USE OF INSULIN (HCC): ICD-10-CM

## 2017-10-11 DIAGNOSIS — Z23 ENCOUNTER FOR IMMUNIZATION: ICD-10-CM

## 2017-10-11 DIAGNOSIS — Z00.00 MEDICARE ANNUAL WELLNESS VISIT, SUBSEQUENT: ICD-10-CM

## 2017-10-11 LAB — HBA1C MFR BLD: 8 % (ref 4.2–5.6)

## 2017-10-11 PROCEDURE — 80061 LIPID PANEL: CPT | Performed by: FAMILY MEDICINE

## 2017-10-11 PROCEDURE — 86803 HEPATITIS C AB TEST: CPT | Performed by: FAMILY MEDICINE

## 2017-10-11 PROCEDURE — 82043 UR ALBUMIN QUANTITATIVE: CPT | Performed by: FAMILY MEDICINE

## 2017-10-11 PROCEDURE — 83036 HEMOGLOBIN GLYCOSYLATED A1C: CPT | Performed by: FAMILY MEDICINE

## 2017-10-11 PROCEDURE — 84153 ASSAY OF PSA TOTAL: CPT | Performed by: FAMILY MEDICINE

## 2017-10-11 RX ORDER — FENOFIBRATE 160 MG/1
TABLET ORAL
Qty: 30 TAB | Refills: 5 | Status: SHIPPED | OUTPATIENT
Start: 2017-10-11 | End: 2017-11-06 | Stop reason: SDUPTHER

## 2017-10-11 NOTE — PATIENT INSTRUCTIONS
Medicare Wellness Visit, Male    The best way to live healthy is to have a healthy lifestyle by eating a well-balanced diet, exercising regularly, limiting alcohol and stopping smoking. Regular physical exams and screening tests are another way to keep healthy. Preventive exams provided by your health care provider can find health problems before they become diseases or illnesses. Preventive services including immunizations, screening tests, monitoring and exams can help you take care of your own health. All people over age 72 should have a pneumovax  and and a prevnar shot to prevent pneumonia. These are once in a lifetime unless you and your provider decide differently. All people over 65 should have a yearly flu shot and a tetanus vaccine every 10 years. Screening for diabetes mellitus with a blood sugar test should be done every year. Glaucoma is a disease of the eye due to increased ocular pressure that can lead to blindness and it should be done every year by an eye professional.    Cardiovascular screening tests that check for elevated lipids (fatty part of blood) which can lead to heart disease and strokes should be done every 5 years. Colorectal screening that evaluates for blood or polyps in your colon should be done yearly as a stool test or every five years as a flexible sigmoidoscope or every 10 years as a colonoscopy up to age 76. Men up to age 76 may need a screening blood test for prostate cancer at certain intervals, depending on their personal and family history. This decision is between the patient and his provider. If you have been a smoker or had family history of abdominal aortic aneurysms, you and your provider may decide to schedule an ultrasound test of your aorta. Hepatitis C screening is also recommended for anyone born between [birth year] through [address]. A shingles vaccine is also recommended once in a lifetime after age 61.     Your Medicare Wellness Exam is recommended annually. Here is a list of your current Health Maintenance items with a due date:  Health Maintenance Due   Topic Date Due    Hepatitis C Test  1945    Hemoglobin A1C    1945    Cholesterol Test   1945    Diabetic Foot Care  01/03/1957    Albumin Urine Test  01/03/1957    Eye Exam  01/03/1957    DTaP/Tdap/Td  (1 - Tdap) 01/03/1968    Stool testing for trace blood  01/03/1997    Shingles Vaccine  11/03/2006    Glaucoma Screening   01/03/2012    Pneumococcal Vaccine (1 of 2 - PCV13) 01/03/2012    Annual Well Visit  01/03/2012    Flu Vaccine  08/01/2017       Vaccine Information Statement    Influenza (Flu) Vaccine (Inactivated or Recombinant): What you need to know    Many Vaccine Information Statements are available in Macedonian and other languages. See www.immunize.org/vis  Hojas de Información Sobre Vacunas están disponibles en Español y en muchos otros idiomas. Visite www.immunize.org/vis    1. Why get vaccinated? Influenza (flu) is a contagious disease that spreads around the [facility] every year, usually between October and May. Flu is caused by influenza viruses, and is spread mainly by coughing, sneezing, and close contact. Anyone can get flu. Flu strikes suddenly and can last several days. Symptoms vary by age, but can include:   fever/chills   sore throat   muscle aches   fatigue   cough   headache    runny or stuffy nose    Flu can also lead to pneumonia and blood infections, and cause diarrhea and seizures in children. If you have a medical condition, such as heart or lung disease, flu can make it worse. Flu is more dangerous for some people. Infants and young children, people 72years of age and older, pregnant women, and people with certain health conditions or a weakened immune system are at greatest risk. Each year thousands of people in the [facility] die from flu, and many more are hospitalized.      Flu vaccine can:   keep you from getting flu,   make flu less severe if you do get it, and   keep you from spreading flu to your family and other people. 2. Inactivated and recombinant flu vaccines    A dose of flu vaccine is recommended every flu season. Children 6 months through 6years of age may need two doses during the same flu season. Everyone else needs only one dose each flu season. Some inactivated flu vaccines contain a very small amount of a mercury-based preservative called thimerosal. Studies have not shown thimerosal in vaccines to be harmful, but flu vaccines that do not contain thimerosal are available. There is no live flu virus in flu shots. They cannot cause the flu. There are many flu viruses, and they are always changing. Each year a new flu vaccine is made to protect against three or four viruses that are likely to cause disease in the upcoming flu season. But even when the vaccine doesnt exactly match these viruses, it may still provide some protection    Flu vaccine cannot prevent:   flu that is caused by a virus not covered by the vaccine, or   illnesses that look like flu but are not. It takes about 2 weeks for protection to develop after vaccination, and protection lasts through the flu season. 3. Some people should not get this vaccine    Tell the person who is giving you the vaccine:     If you have any severe, life-threatening allergies. If you ever had a life-threatening allergic reaction after a dose of flu vaccine, or have a severe allergy to any part of this vaccine, you may be advised not to get vaccinated. Most, but not all, types of flu vaccine contain a small amount of egg protein.  If you ever had Guillain-Barré Syndrome (also called GBS). Some people with a history of GBS should not get this vaccine. This should be discussed with your doctor.  If you are not feeling well.     It is usually okay to get flu vaccine when you have a mild illness, but you might be asked to come back when you feel better. 4. Risks of a vaccine reaction    With any medicine, including vaccines, there is a chance of reactions. These are usually mild and go away on their own, but serious reactions are also possible. Most people who get a flu shot do not have any problems with it. Minor problems following a flu shot include:    soreness, redness, or swelling where the shot was given     hoarseness   sore, red or itchy eyes   cough   fever   aches   headache   itching   fatigue  If these problems occur, they usually begin soon after the shot and last 1 or 2 days. More serious problems following a flu shot can include the following:     There may be a small increased risk of Guillain-Barré Syndrome (GBS) after inactivated flu vaccine. This risk has been estimated at 1 or 2 additional cases per million people vaccinated. This is much lower than the risk of severe complications from flu, which can be prevented by flu vaccine.  Young children who get the flu shot along with pneumococcal vaccine (PCV13) and/or DTaP vaccine at the same time might be slightly more likely to have a seizure caused by fever. Ask your doctor for more information. Tell your doctor if a child who is getting flu vaccine has ever had a seizure. Problems that could happen after any injected vaccine:      People sometimes faint after a medical procedure, including vaccination. Sitting or lying down for about 15 minutes can help prevent fainting, and injuries caused by a fall. Tell your doctor if you feel dizzy, or have vision changes or ringing in the ears.  Some people get severe pain in the shoulder and have difficulty moving the arm where a shot was given. This happens very rarely.  Any medication can cause a severe allergic reaction.  Such reactions from a vaccine are very rare, estimated at about 1 in a million doses, and would happen within a few minutes to a few hours after the vaccination. As with any medicine, there is a very remote chance of a vaccine causing a serious injury or death. The safety of vaccines is always being monitored. For more information, visit: www.cdc.gov/vaccinesafety/    5. What if there is a serious reaction? What should I look for?  Look for anything that concerns you, such as signs of a severe allergic reaction, very high fever, or unusual behavior. Signs of a severe allergic reaction can include hives, swelling of the face and throat, difficulty breathing, a fast heartbeat, dizziness, and weakness  usually within a few minutes to a few hours after the vaccination. What should I do?  If you think it is a severe allergic reaction or other emergency that cant wait, call 9-1-1 and get the person to the nearest hospital. Otherwise, call your doctor.  Reactions should be reported to the Vaccine Adverse Event Reporting System (VAERS). Your doctor should file this report, or you can do it yourself through  the VAERS web site at www.vaers. [facility].gov, or by calling 8-742.810.2592. VAERS does not give medical advice. 6. The National Vaccine Injury Compensation Program    The [facility] Vaccine Injury Compensation Program (VICP) is a federal program that was created to compensate people who may have been injured by certain vaccines. Persons who believe they may have been injured by a vaccine can learn about the program and about filing a claim by calling 5-625.394.4025 or visiting the [employer] website at www.[facility].gov/vaccinecompensation. There is a time limit to file a claim for compensation. 7. How can I learn more?  Ask your healthcare provider. He or she can give you the vaccine package insert or suggest other sources of information.  Call your local or state health department.    Contact the Centers for Disease Control and Prevention (CDC):  - Call 4-819.549.4192 (1-800-CDC-INFO) or  - Visit CDCs website at www.cdc.gov/flu    Vaccine Information Statement   Inactivated Influenza Vaccine   8/7/2015  [address] LADONNA [address] [id number]    Department of Health and Human Services  Centers for Disease Control and Prevention    Office Use Only       Advance Directives: Care Instructions  Your Care Instructions  An advance directive is a legal way to state your wishes at the end of your life. It tells your family and your doctor what to do if you can no longer say what you want. There are two main types of advance directives. You can change them any time that your wishes change. · A living will tells your family and your doctor your wishes about life support and other treatment. · A durable power of  for health care lets you name a person to make treatment decisions for you when you can't speak for yourself. This person is called a health care agent. If you do not have an advance directive, decisions about your medical care may be made by a doctor or a  who doesn't know you. It may help to think of an advance directive as a gift to the people who care for you. If you have one, they won't have to make tough decisions by themselves. Follow-up care is a key part of your treatment and safety. Be sure to make and go to all appointments, and call your doctor if you are having problems. It's also a good idea to know your test results and keep a list of the medicines you take. How can you care for yourself at home? · Discuss your wishes with your loved ones and your doctor. This way, there are no surprises. · Many states have a unique form. Or you might use a universal form that has been approved by many states. This kind of form can sometimes be completed and stored online. Your electronic copy will then be available wherever you have a connection to the Internet. In most cases, doctors will respect your wishes even if you have a form from a different state. · You don't need a  to do an advance directive.  But you may want to get legal advice. · Think about these questions when you prepare an advance directive:  ¨ Who do you want to make decisions about your medical care if you are not able to? Many people choose a family member or close friend. ¨ Do you know enough about life support methods that might be used? If not, talk to your doctor so you understand. ¨ What are you most afraid of that might happen? You might be afraid of having pain, losing your independence, or being kept alive by machines. ¨ Where would you prefer to die? Choices include your home, a hospital, or a nursing home. ¨ Would you like to have information about hospice care to support you and your family? ¨ Do you want to donate organs when you die? ¨ Do you want certain Faith practices performed before you die? If so, put your wishes in the advance directive. · Read your advance directive every year, and make changes as needed. When should you call for help? Be sure to contact your doctor if you have any questions. Where can you learn more? Go to http://dariusz-stanley.info/. Enter R264 in the search box to learn more about \"Advance Directives: Care Instructions. \"  Current as of: November 17, 2016  Content Version: 11.3  © 1121-5111 [employer]. Care instructions adapted under license by [employer] (which disclaims liability or warranty for this information). If you have questions about a medical condition or this instruction, always ask your healthcare professional. [place] [address] any warranty or liability for your use of this information.

## 2017-10-11 NOTE — PROGRESS NOTES
Homa Moore is a 81 y.o. male  presents for follow up. No new complaints. No Known Allergies  Outpatient Prescriptions Marked as Taking for the 10/11/17 encounter (Office Visit) with Quang Kaba MD   Medication Sig Dispense Refill    varicella zoster vacine live (ZOSTAVAX) 19,400 unit/0.65 mL susr injection 1 Vial by SubCUTAneous route once for 1 dose. 0.65 mL 0    ALPRAZolam (XANAX) 0.5 mg tablet TAKE 1 TABLET BY MOUTH EVERY 6 HOURS AS NEEDED FOR ANXIETY 90 Tab 0    simvastatin (ZOCOR) 40 mg tablet Take 1 Tab by mouth nightly. 90 Tab 0    metFORMIN (GLUCOPHAGE) 500 mg tablet TAKE 2 TABLETS BY MOUTH TWICE DAILY WITH MEAL 360 Tab 0    venlafaxine-SR (EFFEXOR-XR) 150 mg capsule TAKE 1 CAPSULE BY MOUTH EVERY DAY IN THE EVENING WITH FOOD 90 Cap 0    venlafaxine-SR (EFFEXOR-XR) 150 mg capsule Take 1 Cap by mouth daily. 90 Cap 1    aspirin delayed-release 81 mg tablet Take  by mouth daily.       atenolol (TENORMIN) 100 mg tablet TK 1 T PO QD.  1    benazepril (LOTENSIN) 40 mg tablet TK 1 T PO QD.  1    glipiZIDE SR (GLUCOTROL XL) 10 mg CR tablet TK 1 T PO QD  0     Patient Active Problem List   Diagnosis Code    Essential hypertension I10    Controlled type 2 diabetes mellitus without complication, without long-term current use of insulin ([facility]) E11.9    Hyperlipidemia E78.5    Chronic left shoulder pain M25.512, G89.29    Pain management contract agreement Z02.89    Anxiety and depression F41.8    ACP (advance care planning) Z71.89     Past Medical History:   Diagnosis Date    Depression     Diabetes ([facility] [address]     Hypercholesterolemia     Hypertension      Social History     Social History    Marital status:      Spouse name: N/A    Number of children: N/A    Years of education: N/A     Social History Main Topics    Smoking status: Current Every Day Smoker     Packs/day: 0.50    Smokeless tobacco: Not on file    Alcohol use No    Drug use: Not on file    Sexual activity: Not on file     Other Topics Concern    Not on file     Social History Narrative     Family History   Problem Relation Age of Onset    Emphysema Mother     Hypertension Father     Heart Attack Father     Emphysema Paternal Grandmother         Review of Systems   Constitutional: Negative for chills and fever. Cardiovascular: Negative for chest pain and palpitations. Skin: Negative for rash. Vitals:    10/11/17 1311   BP: 132/70   Pulse: 77   Resp: 12   Temp: 97.9 °F (36.6 °C)   TempSrc: Oral   SpO2: 98%   Weight: 199 lb 9.6 oz (90.5 kg)   Height: 5' 8.5\" (1.74 m)   PainSc:   0 - No pain       Physical Exam   Constitutional: He is oriented to person, place, and time and well-developed, well-nourished, and in no distress. Cardiovascular: Normal rate, regular rhythm and normal heart sounds. Pulmonary/Chest: Effort normal and breath sounds normal.   Neurological: He is alert and oriented to person, place, and time. Skin: Skin is warm and dry. Psychiatric: Mood, memory, affect and judgment normal.   Nursing note and vitals reviewed. Assessment/Plan      ICD-10-CM ICD-9-CM    1. Controlled type 2 diabetes mellitus without complication, without long-term current use of insulin (HCC) E11.9 250.00 HEMOGLOBIN A1C W/O EAG      REFERRAL TO OPHTHALMOLOGY      REFERRAL TO PODIATRY   2. Need for hepatitis C screening test Z11.59 V73.89 HEPATITIS C AB      HEPATITIS C AB   3. Pure hypercholesterolemia E78.00 272.0 LIPID PANEL   4. Screen for colon cancer Z12.11 V76.51 AMB POC FECAL BLOOD, OCCULT, QL 3 CARDS      OCCULT BLOOD, IMMUNOASSAY (FIT)   5. Encounter for immunization Z23 V03.89 ADMIN INFLUENZA VIRUS VAC      INFLUENZA VIRUS VACCINE, HIGH DOSE SEASONAL, PRESERVATIVE FREE      varicella zoster vacine live (ZOSTAVAX) 19,400 unit/0.65 mL susr injection   6. ACP (advance care planning) Z71.89 V65.49    7.  Medicare annual wellness visit, subsequent Z00.00 V70.0      Follow-up Disposition:  Return in about 3 months (around 1/11/2018).   lab results and schedule of future lab studies reviewed with patient    Sohail Duncan MD

## 2017-10-11 NOTE — PROGRESS NOTES
1. Have you been to the ER, urgent care clinic since your last visit? Hospitalized since your last visit? No    2. Have you seen or consulted any other health care providers outside of the [address] [place] [address] since your last visit? Include any pap smears or colon screening. No      This is an Initial Medicare Annual Wellness Exam (AWV) (Performed 12 months after IPPE or effective date of Medicare Part B enrollment, Once in a lifetime)    I have reviewed the patient's medical history in detail and updated the computerized patient record. History     Past Medical History:   Diagnosis Date    Depression     Diabetes ([address]     Hypercholesterolemia     Hypertension       Past Surgical History:   Procedure Laterality Date    HX ORTHOPAEDIC  2010    left shoulder repair     Current Outpatient Prescriptions   Medication Sig Dispense Refill    ALPRAZolam (XANAX) 0.5 mg tablet TAKE 1 TABLET BY MOUTH EVERY 6 HOURS AS NEEDED FOR ANXIETY 90 Tab 0    simvastatin (ZOCOR) 40 mg tablet Take 1 Tab by mouth nightly. 90 Tab 0    metFORMIN (GLUCOPHAGE) 500 mg tablet TAKE 2 TABLETS BY MOUTH TWICE DAILY WITH MEAL 360 Tab 0    venlafaxine-SR (EFFEXOR-XR) 150 mg capsule TAKE 1 CAPSULE BY MOUTH EVERY DAY IN THE EVENING WITH FOOD 90 Cap 0    venlafaxine-SR (EFFEXOR-XR) 150 mg capsule Take 1 Cap by mouth daily. 90 Cap 1    aspirin delayed-release 81 mg tablet Take  by mouth daily.       atenolol (TENORMIN) 100 mg tablet TK 1 T PO QD.  1    benazepril (LOTENSIN) 40 mg tablet TK 1 T PO QD.  1    glipiZIDE SR (GLUCOTROL XL) 10 mg CR tablet TK 1 T PO QD  0    fenofibrate (LOFIBRA) 160 mg tablet TK 1 T PO QD WITH A MEAL  0    oxyCODONE IR (ROXICODONE) 5 mg immediate release tablet TK 1 T PO  TID FOR 30 DAYS  0     No Known Allergies  Family History   Problem Relation Age of Onset    Emphysema Mother     Hypertension Father     Heart Attack Father     Emphysema Paternal Grandmother      Social History   Substance Use Topics    Smoking status: Current Every Day Smoker     Packs/day: 0.50    Smokeless tobacco: Not on file    Alcohol use No     Patient Active Problem List   Diagnosis Code    Essential hypertension I10    Controlled type 2 diabetes mellitus without complication, without long-term current use of insulin (HCC) E11.9    Hyperlipidemia E78.5    Chronic left shoulder pain M25.512, G89.29    Pain management contract agreement Z02.89    Anxiety and depression F41.8       Depression Risk Factor Screening:     PHQ over the last two weeks 7/7/2017   PHQ Not Done -   Little interest or pleasure in doing things Nearly every day   Feeling down, depressed or hopeless Nearly every day   Total Score PHQ 2 6   Trouble falling or staying asleep, or sleeping too much Nearly every day   Feeling tired or having little energy Several days   Poor appetite or overeating More than half the days   Feeling bad about yourself - or that you are a failure or have let yourself or your family down Not at all   Trouble concentrating on things such as school, work, reading or watching TV Nearly every day   Moving or speaking so slowly that other people could have noticed; or the opposite being so fidgety that others notice Nearly every day   Thoughts of being better off dead, or hurting yourself in some way Nearly every day   PHQ 9 Score 21   How difficult have these problems made it for you to do your work, take care of your home and get along with others Somewhat difficult     Alcohol Risk Factor Screening: You do not drink alcohol or very rarely. Functional Ability and Level of Safety:     Hearing Loss  Hearing is good. Whisper Test done with normal results. Activities of Daily Living  The home contains: no safety equipment. Patient does total self care    Fall RiskFall Risk Assessment, last 12 mths 10/11/2017   Able to walk? Yes   Fall in past 12 months?  No       Abuse Screen  Patient is not abused    Cognitive Screening Evaluation of Cognitive Function:  Has your family/caregiver stated any concerns about your memory: no  Normal    Patient Care Team   Patient Care Team:  Ida Campuzano MD as PCP - General (Family Practice)    Assessment/Plan   Education and counseling provided:            Health Maintenance Due   Topic Date Due    Hepatitis C Screening  1945    HEMOGLOBIN A1C Q6M  1945    LIPID PANEL Q1  1945    FOOT EXAM Q1  01/03/1957    MICROALBUMIN Q1  01/03/1957    EYE EXAM RETINAL OR DILATED Q1  01/03/1957    DTaP/Tdap/Td series (1 - Tdap) 01/03/1968    FOBT Q 1 YEAR AGE 50-75  01/03/1997    ZOSTER VACCINE AGE 60>  11/03/2006    GLAUCOMA SCREENING Q2Y  01/03/2012    Pneumococcal 65+ Low/Medium Risk (1 of 2 - PCV13) 01/03/2012    MEDICARE YEARLY EXAM  01/03/2012    INFLUENZA AGE 9 TO ADULT  08/01/2017       Ida Campuzano MD

## 2017-10-11 NOTE — MR AVS SNAPSHOT
Visit Information Date & Time Provider Department Dept. Phone Encounter #  
 10/11/2017  1:15 PM Maite Villanueva MD [facility] [phone] [id number] Follow-up Instructions Return in about 3 months (around 1/11/2018). Upcoming Health Maintenance Date Due Hepatitis C Screening 1945 HEMOGLOBIN A1C Q6M 1945 LIPID PANEL Q1 1945 FOOT EXAM Q1 1/3/1957 MICROALBUMIN Q1 1/3/1957 EYE EXAM RETINAL OR DILATED Q1 1/3/1957 DTaP/Tdap/Td series (1 - Tdap) 1/3/1968 FOBT Q 1 YEAR AGE 50-75 1/3/1997 ZOSTER VACCINE AGE 60> 11/3/2006 GLAUCOMA SCREENING Q2Y 1/3/2012 Pneumococcal 65+ Low/Medium Risk (1 of 2 - PCV13) 1/3/2012 MEDICARE YEARLY EXAM 1/3/2012 INFLUENZA AGE 9 TO ADULT 8/1/2017 Allergies as of 10/11/2017  Review Complete On: 10/11/2017 By: Maite Villanueva MD  
 No Known Allergies Current Immunizations  Never Reviewed Name Date Influenza High Dose Vaccine PF  Incomplete Not reviewed this visit You Were Diagnosed With   
  
 Codes Comments Controlled type 2 diabetes mellitus without complication, without long-term current use of insulin ([facility] [address]    -  Primary ICD-10-CM: E11.9 ICD-9-CM: 250.00 Need for hepatitis C screening test     ICD-10-CM: Z11.59 
ICD-9-CM: V73.89 Pure hypercholesterolemia     ICD-10-CM: E78.00 ICD-9-CM: 272.0 Screen for colon cancer     ICD-10-CM: Z12.11 ICD-9-CM: V76.51 Encounter for immunization     ICD-10-CM: Z29 ICD-9-CM: V03.89   
 ACP (advance care planning)     ICD-10-CM: Z71.89 ICD-9-CM: V65.49 Medicare annual wellness visit, subsequent     ICD-10-CM: Z00.00 ICD-9-CM: V70.0 Prostate cancer screening     ICD-10-CM: Z12.5 ICD-9-CM: V76.44 Vitals BP Pulse Temp Resp Height(growth percentile) Weight(growth percentile)  132/70 (BP 1 Location: Left arm, BP Patient Position: Sitting) 77 97.9 °F (36.6 °C) (Oral) 12 5' 8.5\" (1.74 m) 199 lb 9.6 oz (90.5 kg) SpO2 BMI Smoking Status 98% 29.91 kg/m2 Current Every Day Smoker Vitals History BMI and BSA Data Body Mass Index Body Surface Area  
 29.91 kg/m 2 2.09 m 2 Preferred Pharmacy Pharmacy Name Phone [id number] [place], [address] [place] [address] AT [address] [place] [address] 80 [phone] Your Updated Medication List  
  
   
This list is accurate as of: 10/11/17  1:42 PM.  Always use your most recent med list.  
  
  
  
  
 ALPRAZolam 0.5 mg tablet Commonly known as:  XANAX  
TAKE 1 TABLET BY MOUTH EVERY 6 HOURS AS NEEDED FOR ANXIETY  
  
 aspirin delayed-release 81 mg tablet Take  by mouth daily. atenolol 100 mg tablet Commonly known as:  TENORMIN  
TK 1 T PO QD.  
  
 benazepril 40 mg tablet Commonly known as:  LOTENSIN  
TK 1 T PO QD.  
  
 fenofibrate 160 mg tablet Commonly known as:  LOFIBRA TK 1 T PO QD WITH A MEAL  
  
 glipiZIDE SR 10 mg CR tablet Commonly known as:  GLUCOTROL XL  
TK 1 T PO QD  
  
 metFORMIN 500 mg tablet Commonly known as:  GLUCOPHAGE  
TAKE 2 TABLETS BY MOUTH TWICE DAILY WITH MEAL  
  
 oxyCODONE IR 5 mg immediate release tablet Commonly known as:  ROXICODONE  
TK 1 T PO  TID FOR 30 DAYS  
  
 simvastatin 40 mg tablet Commonly known as:  ZOCOR Take 1 Tab by mouth nightly. varicella zoster vacine live 19,400 unit/0.65 mL Susr injection Commonly known as:  ZOSTAVAX  
1 Vial by SubCUTAneous route once for 1 dose. * venlafaxine- mg capsule Commonly known as:  EFFEXOR-XR Take 1 Cap by mouth daily. * venlafaxine- mg capsule Commonly known as:  EFFEXOR-XR  
TAKE 1 CAPSULE BY MOUTH EVERY DAY IN THE EVENING WITH FOOD * Notice: This list has 2 medication(s) that are the same as other medications prescribed for you.  Read the directions carefully, and ask your doctor or other care provider to review them with you. Prescriptions Printed Refills  
 varicella zoster vacine live (ZOSTAVAX) 19,400 unit/0.65 mL susr injection 0 Si Vial by SubCUTAneous route once for 1 dose. Class: Print Route: SubCUTAneous We Performed the Following ADMIN INFLUENZA VIRUS VAC [ [facility]] AMB POC FECAL BLOOD, OCCULT, QL 3 CARDS [48700 CPT(R)] INFLUENZA VIRUS VACCINE, HIGH DOSE SEASONAL, PRESERVATIVE FREE [57098 CPT(R)] REFERRAL TO OPHTHALMOLOGY [REF57 Custom] Comments:  
 Please evaluate patient for DM eye exam as well as Glaucoma screen. REFERRAL TO PODIATRY [REF90 Custom] Comments:  
 DM foot exam  
  
Follow-up Instructions Return in about 3 months (around 2018). To-Do List   
 10/11/2017 Lab:  HEMOGLOBIN A1C W/O EAG   
  
 10/11/2017 Lab:  HEPATITIS C AB   
  
 10/11/2017 Lab:  HEPATITIS C AB   
  
 10/11/2017 Lab:  LIPID PANEL   
  
 10/11/2017 Lab:  PSA SCREENING (SCREENING)   
  
 11/10/2017 Lab:  OCCULT BLOOD, IMMUNOASSAY (FIT) Referral Information Referral ID Referred By Referred To  
  
 [id number] Olga Jones, [address] [place] [address], [address] [facility], [address] Phone: [phone] Fax: [phone] Visits Status Start Date End Date 1 New Request 10/11/17 10/11/18 If your referral has a status of pending review or denied, additional information will be sent to support the outcome of this decision. Referral ID Referred By Referred To  
 [id number] Olga Jones, Alejandro E Nehemiah Mojica MD  
   [address] [place] [address] , [address] Phone: [phone] Fax: [phone] Visits Status Start Date End Date 1 New Request 10/11/17 10/11/18 If your referral has a status of pending review or denied, additional information will be sent to support the outcome of this decision. Patient Instructions Medicare Wellness Visit, Male The best way to live healthy is to have a healthy lifestyle by eating a well-balanced diet, exercising regularly, limiting alcohol and stopping smoking. Regular physical exams and screening tests are another way to keep healthy. Preventive exams provided by your health care provider can find health problems before they become diseases or illnesses. Preventive services including immunizations, screening tests, monitoring and exams can help you take care of your own health. All people over age 72 should have a pneumovax  and and a prevnar shot to prevent pneumonia. These are once in a lifetime unless you and your provider decide differently. All people over 65 should have a yearly flu shot and a tetanus vaccine every 10 years. Screening for diabetes mellitus with a blood sugar test should be done every year. Glaucoma is a disease of the eye due to increased ocular pressure that can lead to blindness and it should be done every year by an eye professional. 
 
Cardiovascular screening tests that check for elevated lipids (fatty part of blood) which can lead to heart disease and strokes should be done every 5 years. Colorectal screening that evaluates for blood or polyps in your colon should be done yearly as a stool test or every five years as a flexible sigmoidoscope or every 10 years as a colonoscopy up to age 76. Men up to age 76 may need a screening blood test for prostate cancer at certain intervals, depending on their personal and family history. This decision is between the patient and his provider. If you have been a smoker or had family history of abdominal aortic aneurysms, you and your provider may decide to schedule an ultrasound test of your aorta. Hepatitis C screening is also recommended for anyone born between [birth year] through [address]. A shingles vaccine is also recommended once in a lifetime after age 61. Your Medicare Wellness Exam is recommended annually. Here is a list of your current Health Maintenance items with a due date: 
Health Maintenance Due Topic Date Due  
 Hepatitis C Test  1945  Hemoglobin A1C    1945  Cholesterol Test   1947 [facility] [employer] [facility]  01/03/1957  Albumin Urine Test  01/03/1957  Eye Exam  01/03/1957  
 DTaP/Tdap/Td  (1 - Tdap) 01/03/1968  Stool testing for trace blood  01/03/1997  Shingles Vaccine  11/03/2006  Glaucoma Screening   01/03/2012  Pneumococcal Vaccine (1 of 2 - PCV13) 01/03/2012 [facility] [place] Annual Well Visit  01/03/2012  Flu Vaccine  08/01/2017 Vaccine Information Statement Influenza (Flu) Vaccine (Inactivated or Recombinant): What you need to know Many Vaccine Information Statements are available in [place] and other languages. See www.immunize.org/vis Hojas de Información Sobre Vacunas están disponibles en Español y en muchos otros idiomas. Visite www.immunize.org/vis 1. Why get vaccinated? Influenza (flu) is a contagious disease that spreads around the [place] every year, usually between October and May. Flu is caused by influenza viruses, and is spread mainly by coughing, sneezing, and close contact. Anyone can get flu. Flu strikes suddenly and can last several days. Symptoms vary by age, but can include: 
 fever/chills  sore throat  muscle aches  fatigue  cough  headache  runny or stuffy nose Flu can also lead to pneumonia and blood infections, and cause diarrhea and seizures in children. If you have a medical condition, such as heart or lung disease, flu can make it worse. Flu is more dangerous for some people. Infants and young children, people 72years of age and older, pregnant women, and people with certain health conditions or a weakened immune system are at greatest risk.    
 
Each year thousands of people in the [facility] die from flu, and many more are hospitalized. Flu vaccine can: 
 keep you from getting flu, 
 make flu less severe if you do get it, and 
 keep you from spreading flu to your family and other people. 2. Inactivated and recombinant flu vaccines A dose of flu vaccine is recommended every flu season. Children 6 months through 6years of age may need two doses during the same flu season. Everyone else needs only one dose each flu season. Some inactivated flu vaccines contain a very small amount of a mercury-based preservative called thimerosal. Studies have not shown thimerosal in vaccines to be harmful, but flu vaccines that do not contain thimerosal are available. There is no live flu virus in flu shots. They cannot cause the flu. There are many flu viruses, and they are always changing. Each year a new flu vaccine is made to protect against three or four viruses that are likely to cause disease in the upcoming flu season. But even when the vaccine doesnt exactly match these viruses, it may still provide some protection Flu vaccine cannot prevent: 
 flu that is caused by a virus not covered by the vaccine, or 
 illnesses that look like flu but are not. It takes about 2 weeks for protection to develop after vaccination, and protection lasts through the flu season. 3. Some people should not get this vaccine Tell the person who is giving you the vaccine:  If you have any severe, life-threatening allergies. If you ever had a life-threatening allergic reaction after a dose of flu vaccine, or have a severe allergy to any part of this vaccine, you may be advised not to get vaccinated. Most, but not all, types of flu vaccine contain a small amount of egg protein.  If you ever had Guillain-Barré Syndrome (also called GBS). Some people with a history of GBS should not get this vaccine. This should be discussed with your doctor.  If you are not feeling well. It is usually okay to get flu vaccine when you have a mild illness, but you might be asked to come back when you feel better. 4. Risks of a vaccine reaction With any medicine, including vaccines, there is a chance of reactions. These are usually mild and go away on their own, but serious reactions are also possible. Most people who get a flu shot do not have any problems with it. Minor problems following a flu shot include:  
 soreness, redness, or swelling where the shot was given  hoarseness  sore, red or itchy eyes  cough  fever  aches  headache  itching  fatigue If these problems occur, they usually begin soon after the shot and last 1 or 2 days. More serious problems following a flu shot can include the following:  There may be a small increased risk of Guillain-Barré Syndrome (GBS) after inactivated flu vaccine. This risk has been estimated at 1 or 2 additional cases per million people vaccinated. This is much lower than the risk of severe complications from flu, which can be prevented by flu vaccine.  Young children who get the flu shot along with pneumococcal vaccine (PCV13) and/or DTaP vaccine at the same time might be slightly more likely to have a seizure caused by fever. Ask your doctor for more information. Tell your doctor if a child who is getting flu vaccine has ever had a seizure. Problems that could happen after any injected vaccine:  People sometimes faint after a medical procedure, including vaccination. Sitting or lying down for about 15 minutes can help prevent fainting, and injuries caused by a fall. Tell your doctor if you feel dizzy, or have vision changes or ringing in the ears.  Some people get severe pain in the shoulder and have difficulty moving the arm where a shot was given. This happens very rarely.  Any medication can cause a severe allergic reaction.  Such reactions from a vaccine are very rare, estimated at about 1 in a million doses, and would happen within a few minutes to a few hours after the vaccination. As with any medicine, there is a very remote chance of a vaccine causing a serious injury or death. The safety of vaccines is always being monitored. For more information, visit: www.cdc.gov/vaccinesafety/ 
 
 
The [facility] Vaccine Injury Compensation Program (VICP) is a federal program that was created to compensate people who may have been injured by certain vaccines. Persons who believe they may have been injured by a vaccine can learn about the program and about filing a claim by calling 3-208.660.9244 or visiting the [facility] "[employer] website at www.[facility] Vegas[facility].gov/vaccinecompensation. There is a time limit to file a claim for compensation. 7. How can I learn more?  Ask your healthcare provider. He or she can give you the vaccine package insert or suggest other sources of information.  Call your local or state health department.  Contact the Centers for Disease Control and Prevention (CDC): 
- Call 9-176.902.5558 (1-800-CDC-INFO) or 
- Visit CDCs website at www.cdc.gov/flu Vaccine Information Statement Inactivated Influenza Vaccine 8/7/2015 
42 LADONNA Flanagan [id number] Department of [facility] [employer] Centers for Disease Control and Prevention Office Use Only Advance Directives: Care Instructions Your Care Instructions An advance directive is a legal way to state your wishes at the end of your life. It tells your family and your doctor what to do if you can no longer say what you want. There are two main types of advance directives. You can change them any time that your wishes change. · A living will tells your family and your doctor your wishes about life support and other treatment. · A durable power of  for health care lets you name a person to make treatment decisions for you when you can't speak for yourself. This person is called a health care agent. If you do not have an advance directive, decisions about your medical care may be made by a doctor or a  who doesn't know you. It may help to think of an advance directive as a gift to the people who care for you. If you have one, they won't have to make tough decisions by themselves. Follow-up care is a key part of your treatment and safety. Be sure to make and go to all appointments, and call your doctor if you are having problems. It's also a good idea to know your test results and keep a list of the medicines you take. How can you care for yourself at home? · Discuss your wishes with your loved ones and your doctor. This way, there are no surprises. · Many states have a unique form. Or you might use a universal form that has been approved by many states. This kind of form can sometimes be completed and stored online. Your electronic copy will then be available wherever you have a connection to the Internet.  In most cases, doctors will respect your wishes even if you have a form from a different state. · You don't need a  to do an advance directive. But you may want to get legal advice. · Think about these questions when you prepare an advance directive: ¨ Who do you want to make decisions about your medical care if you are not able to? Many people choose a family member or close friend. ¨ Do you know enough about life support methods that might be used? If not, talk to your doctor so you understand. ¨ What are you most afraid of that might happen? You might be afraid of having pain, losing your independence, or being kept alive by machines. ¨ Where would you prefer to die? Choices include your home, a hospital, or a nursing home. ¨ Would you like to have information about hospice care to support you and your family? ¨ Do you want to donate organs when you die? ¨ Do you want certain [employer] practices performed before you die? If so, put your wishes in the advance directive. · Read your advance directive every year, and make changes as needed. When should you call for help? Be sure to contact your doctor if you have any questions. Where can you learn more? Go to http://dariusz-stanley.info/. Enter R264 in the search box to learn more about \"Advance Directives: Care Instructions. \" Current as of: November 17, 2016 Content Version: 11.3 © 7863-0464 [employer]. Care instructions adapted under license by [employer] (which disclaims liability or warranty for this information). If you have questions about a medical condition or this instruction, always ask your healthcare professional. [place] [address] any warranty or liability for your use of this information. Introducing [facility]! Dear Brent Purchase: Thank you for requesting a MODLOFT account. Our records indicate that you already have an active MODLOFT account.   You can access your account anytime at [email or web address] Did you know that you can access your hospital and ER discharge instructions at any time in Lumeta? You can also review all of your test results from your hospital stay or ER visit. Additional Information If you have questions, please visit the Frequently Asked Questions section of the [employer] website at [email or web address] Remember, Lumeta is NOT to be used for urgent needs. For medical emergencies, dial 911. Now available from your iPhone and Android! Please provide this summary of care documentation to your next provider. Your primary care clinician is listed as [address]. If you have any questions after today's visit, please call [phone].

## 2017-10-11 NOTE — ACP (ADVANCE CARE PLANNING)
Advance Care Planning (ACP) Provider Conversation Snapshot    Date of ACP Conversation: 10/11/17  Persons included in Conversation:  patient  Length of ACP Conversation in minutes:  16 minutes    Authorized Decision Maker (if patient is incapable of making informed decisions):    This person is:   Healthcare Agent/Medical Power of  under Advance Directive          For Patients with Decision Making Capacity:   Values/Goals: Exploration of values, goals, and preferences if recovery is not expected, even with continued medical treatment in the event of:  Imminent death  Severe, permanent brain injury    Conversation Outcomes / Follow-Up Plan:   Recommended completion of Advance Directive form after review of ACP materials and conversation with prospective healthcare agent

## 2017-10-12 LAB
CHOLEST SERPL-MCNC: 142 MG/DL
CREAT UR-MCNC: 118.09 MG/DL (ref 30–125)
HCV AB SER IA-ACNC: 0.07 INDEX
HCV AB SERPL QL IA: NEGATIVE
HCV COMMENT,HCGAC: NORMAL
HDLC SERPL-MCNC: 37 MG/DL (ref 40–60)
HDLC SERPL: 3.8 {RATIO} (ref 0–5)
LDLC SERPL CALC-MCNC: 42.6 MG/DL (ref 0–100)
LIPID PROFILE,FLP: ABNORMAL
MICROALBUMIN UR-MCNC: 6 MG/DL (ref 0–3)
MICROALBUMIN/CREAT UR-RTO: 51 MG/G (ref 0–30)
PSA SERPL-MCNC: 0.8 NG/ML (ref 0–4)
TRIGL SERPL-MCNC: 312 MG/DL (ref ?–150)
VLDLC SERPL CALC-MCNC: 62.4 MG/DL

## 2017-11-07 DIAGNOSIS — F41.9 ANXIETY AND DEPRESSION: ICD-10-CM

## 2017-11-07 DIAGNOSIS — E78.00 PURE HYPERCHOLESTEROLEMIA: ICD-10-CM

## 2017-11-07 DIAGNOSIS — E78.5 HYPERLIPIDEMIA, UNSPECIFIED HYPERLIPIDEMIA TYPE: ICD-10-CM

## 2017-11-07 DIAGNOSIS — F32.A ANXIETY AND DEPRESSION: ICD-10-CM

## 2017-11-07 RX ORDER — ATENOLOL 100 MG/1
TABLET ORAL
Qty: 90 TAB | Refills: 0 | Status: SHIPPED | OUTPATIENT
Start: 2017-11-07 | End: 2018-02-23 | Stop reason: SDUPTHER

## 2017-11-07 RX ORDER — GLIPIZIDE 10 MG/1
TABLET, FILM COATED, EXTENDED RELEASE ORAL
Qty: 90 TAB | Refills: 0 | Status: SHIPPED | OUTPATIENT
Start: 2017-11-07 | End: 2018-02-05 | Stop reason: SDUPTHER

## 2017-11-08 RX ORDER — SIMVASTATIN 40 MG/1
40 TABLET, FILM COATED ORAL
Qty: 90 TAB | Refills: 0 | Status: SHIPPED | OUTPATIENT
Start: 2017-11-08 | End: 2019-08-02 | Stop reason: SDUPTHER

## 2017-11-08 RX ORDER — FENOFIBRATE 160 MG/1
TABLET ORAL
Qty: 90 TAB | Refills: 5 | Status: SHIPPED | OUTPATIENT
Start: 2017-11-08 | End: 2019-01-28 | Stop reason: SDUPTHER

## 2017-11-08 RX ORDER — ALPRAZOLAM 0.5 MG/1
0.5 TABLET ORAL
Qty: 30 TAB | Refills: 0 | Status: SHIPPED | OUTPATIENT
Start: 2017-11-08 | End: 2017-11-13 | Stop reason: SDUPTHER

## 2017-11-08 NOTE — TELEPHONE ENCOUNTER
From: Cody Lucero  To: Kendrick Barreto MD  Sent: 11/7/2017 4:57 PM EST  Subject: Medication Renewal Request    Original authorizing provider: MD Cody Vanegas would like a refill of the following medications:  simvastatin (ZOCOR) 40 mg tablet Kendrick Barreto MD]  ALPRAZolam Florecita Katya) 0.5 mg tablet Kendrick Barreto MD]    Preferred pharmacy: [address] [place] [address] ([facility]) & MAIN    Comment:

## 2017-11-13 DIAGNOSIS — F32.A ANXIETY AND DEPRESSION: ICD-10-CM

## 2017-11-13 DIAGNOSIS — F41.9 ANXIETY AND DEPRESSION: ICD-10-CM

## 2017-11-13 NOTE — TELEPHONE ENCOUNTER
This patient contacted office for the following prescriptions to be filled:    Medication requested :   Requested Prescriptions     Pending Prescriptions Disp Refills    ALPRAZolam (XANAX) 0.5 mg tablet 30 Tab 0     Sig: Take 1 Tab by mouth nightly as needed for Anxiety. Max Daily Amount: 0.5 mg.   (Patient called and states he picked this up a few days ago but it was only written for 30 pills and he states it usually for 90 pills as he takes it three times a day. I let him know it said in the computer only nightly as needed.  I told him I would make the nurse aware and have her talk to the provider)   PCP: Dr. Rian Skiff or Print: Print  Mail order or Local pharmacy: Print    Scheduled appointment if not seen by current providers in office:

## 2017-11-15 NOTE — TELEPHONE ENCOUNTER
Patient is calling again, still hasn't got an answer. He would like a call back as soon as possible. He needs the 90 tablets as explained in the 11/13/17 note.

## 2017-11-16 RX ORDER — ALPRAZOLAM 0.5 MG/1
0.5 TABLET ORAL
Qty: 30 TAB | Refills: 0 | Status: SHIPPED | OUTPATIENT
Start: 2017-11-16 | End: 2017-11-17 | Stop reason: SDUPTHER

## 2017-11-17 DIAGNOSIS — F32.A ANXIETY AND DEPRESSION: ICD-10-CM

## 2017-11-17 DIAGNOSIS — F41.9 ANXIETY AND DEPRESSION: ICD-10-CM

## 2017-11-17 RX ORDER — ALPRAZOLAM 0.5 MG/1
0.5 TABLET ORAL
Qty: 90 TAB | Refills: 0 | Status: SHIPPED | OUTPATIENT
Start: 2017-11-17 | End: 2017-12-21 | Stop reason: SDUPTHER

## 2017-11-17 NOTE — TELEPHONE ENCOUNTER
Patient came in to the office earlier to  his script and it was still written for 30 days. This was corrected and he was given a new 90 day script. Patient left the office and called back frustrated b/c although it was corrected to 90 days it was still written incorrectly and he is unable to fill. Please call him at [phone].

## 2017-11-17 NOTE — TELEPHONE ENCOUNTER
Spoke patient about his Xanax. He stated he dropped of the medication at the pharmacy and they couldn't fill it, because the SIG was wrong. I told him that I would call and speak with the pharmacy and call him back. Spoke to Dawit Hernandez at [facility]. Per dr navarrete's verbal order with read back, he approved for current script to be cancelled and for the SIG to be changed it is as follows     Xanax 0.5 mg take 1 pill TID PRN anxiety #90 w/0 refills. Pharmacist Read back order and stated understanding. Pt was made aware of the med had been fixed. He was very appreciative. Call was ended.

## 2017-12-21 ENCOUNTER — PATIENT MESSAGE (OUTPATIENT)
Dept: FAMILY MEDICINE CLINIC | Age: 72
End: 2017-12-21

## 2017-12-21 DIAGNOSIS — F32.A ANXIETY AND DEPRESSION: ICD-10-CM

## 2017-12-21 DIAGNOSIS — F41.9 ANXIETY AND DEPRESSION: ICD-10-CM

## 2017-12-21 RX ORDER — ALPRAZOLAM 0.5 MG/1
TABLET ORAL
Qty: 90 TAB | Refills: 0 | Status: SHIPPED | OUTPATIENT
Start: 2017-12-21 | End: 2018-01-18 | Stop reason: SDUPTHER

## 2017-12-22 NOTE — TELEPHONE ENCOUNTER
From: Monica Tolbert  To: Evelia Parker MD  Sent: 12/21/2017 3:52 PM EST  Subject: Prescription Question    I received a notice from [address] to contact Dr Wendy Tillman office regarding my refill request for ALPRAZOLAN 0.5 mg. I can be reached by thelephone at [phone] or by email at [email or web address]   Thank you

## 2018-01-11 ENCOUNTER — OFFICE VISIT (OUTPATIENT)
Dept: FAMILY MEDICINE CLINIC | Age: 73
End: 2018-01-11

## 2018-01-11 VITALS
SYSTOLIC BLOOD PRESSURE: 138 MMHG | TEMPERATURE: 98.2 F | WEIGHT: 198 LBS | HEART RATE: 64 BPM | DIASTOLIC BLOOD PRESSURE: 88 MMHG | BODY MASS INDEX: 29.33 KG/M2 | RESPIRATION RATE: 15 BRPM | OXYGEN SATURATION: 99 % | HEIGHT: 69 IN

## 2018-01-11 DIAGNOSIS — F41.9 ANXIETY AND DEPRESSION: ICD-10-CM

## 2018-01-11 DIAGNOSIS — E78.00 PURE HYPERCHOLESTEROLEMIA: ICD-10-CM

## 2018-01-11 DIAGNOSIS — I10 ESSENTIAL HYPERTENSION: ICD-10-CM

## 2018-01-11 DIAGNOSIS — E11.9 CONTROLLED TYPE 2 DIABETES MELLITUS WITHOUT COMPLICATION, WITHOUT LONG-TERM CURRENT USE OF INSULIN (HCC): Primary | ICD-10-CM

## 2018-01-11 DIAGNOSIS — F32.A ANXIETY AND DEPRESSION: ICD-10-CM

## 2018-01-11 PROBLEM — F33.9 RECURRENT DEPRESSION (HCC): Status: ACTIVE | Noted: 2018-01-11

## 2018-01-11 PROBLEM — E11.21 TYPE 2 DIABETES MELLITUS WITH NEPHROPATHY (HCC): Status: ACTIVE | Noted: 2018-01-11

## 2018-01-11 LAB — HBA1C MFR BLD HPLC: 8.3 %

## 2018-01-11 NOTE — PROGRESS NOTES
Cristiano Toth is a 72 y.o. male  presents for follow up. No new complaints. No Known Allergies  Outpatient Prescriptions Marked as Taking for the 1/11/18 encounter (Office Visit) with Jagdish Dawson MD   Medication Sig Dispense Refill    ALPRAZolam (XANAX) 0.5 mg tablet TAKE 1 TABLET BY MOUTH THREE TIMES DAILY AS NEEDED FOR ANXIETY 90 Tab 0    fenofibrate (LOFIBRA) 160 mg tablet TAKE 1 TABLET BY MOUTH EVERY DAY WITH A MEAL 90 Tab 5    simvastatin (ZOCOR) 40 mg tablet Take 1 Tab by mouth nightly. 90 Tab 0    glipiZIDE SR (GLUCOTROL XL) 10 mg CR tablet TAKE 1 TABLET BY MOUTH EVERY DAY 90 Tab 0    atenolol (TENORMIN) 100 mg tablet TAKE 1 TABLET BY MOUTH EVERY DAY 90 Tab 0    metFORMIN (GLUCOPHAGE) 500 mg tablet TAKE 2 TABLETS BY MOUTH TWICE DAILY WITH MEAL 360 Tab 0    venlafaxine-SR (EFFEXOR-XR) 150 mg capsule TAKE 1 CAPSULE BY MOUTH EVERY DAY IN THE EVENING WITH FOOD 90 Cap 0    venlafaxine-SR (EFFEXOR-XR) 150 mg capsule Take 1 Cap by mouth daily. 90 Cap 1    aspirin delayed-release 81 mg tablet Take  by mouth daily.       benazepril (LOTENSIN) 40 mg tablet TK 1 T PO QD.  1     Patient Active Problem List   Diagnosis Code    Essential hypertension I10    Controlled type 2 diabetes mellitus without complication, without long-term current use of insulin ([facility]) E11.9    Hyperlipidemia E78.5    Chronic left shoulder pain M25.512, G89.29    Pain management contract agreement Z02.89    Anxiety and depression F41.8    ACP (advance care planning) Z71.89    Type 2 diabetes mellitus with nephropathy ([facility] E11.21    [facility] F33.9     Past Medical History:   Diagnosis Date    Depression     [facility]     Hypercholesterolemia     Hypertension      Social History     Social History    Marital status:      Spouse name: N/A    Number of children: N/A    Years of education: N/A     Social History Main Topics    Smoking status: Current Every Day Smoker     Packs/day: 0.50  Smokeless tobacco: Never Used    Alcohol use No    Drug use: Not on file    Sexual activity: Not on file     Other Topics Concern    Not on file     Social History Narrative     Family History   Problem Relation Age of Onset    Emphysema Mother     Hypertension Father     Heart Attack Father     Emphysema Paternal Grandmother         Review of Systems   Constitutional: Negative for chills and fever. Cardiovascular: Negative for chest pain. Gastrointestinal: Negative for constipation, diarrhea, nausea and vomiting. Genitourinary: Negative. Skin: Negative for rash. Psychiatric/Behavioral: Negative. Vitals:    01/11/18 1307   BP: 138/88   Pulse: 64   Resp: 15   Temp: 98.2 °F (36.8 °C)   SpO2: 99%   Weight: 198 lb (89.8 kg)   Height: 5' 8.5\" (1.74 m)   PainSc:   0 - No pain       Physical Exam   Constitutional: He is oriented to person, place, and time and well-developed, well-nourished, and in no distress. Neck: Normal range of motion. Neck supple. Cardiovascular: Normal rate, regular rhythm and normal heart sounds. Pulmonary/Chest: Effort normal and breath sounds normal.   Musculoskeletal: Normal range of motion. Neurological: He is alert and oriented to person, place, and time. Skin: Skin is warm and dry. Psychiatric: Mood, memory, affect and judgment normal.   Nursing note and vitals reviewed. Assessment/Plan      ICD-10-CM ICD-9-CM    1. Controlled type 2 diabetes mellitus without complication, without long-term current use of insulin (HCC) E11.9 250.00 AMB POC HEMOGLOBIN A1C   2. Essential hypertension I10 401.9 stable   3. Pure hypercholesterolemia E78.00 272.0 stable   4. Anxiety and depression F41.8 300.00 stable     311      I have discussed the diagnosis with the patient and the intended plan of care as seen in the above orders. The patient has received an after-visit summary and questions were answered concerning future plans.  I have discussed medication, side effects, and warnings with the patient in detail. The patient verbalized understanding and is in agreement with the plan of care. The patient will contact the office with any additional concerns. Follow-up Disposition:  Return in about 3 months (around 4/11/2018).   lab results and schedule of future lab studies reviewed with patient      Concepcion Solares MD

## 2018-01-11 NOTE — MR AVS SNAPSHOT
Visit Information Date & Time Provider Department Dept. Phone Encounter #  
 1/11/2018  1:00 PM Severiano Dubon MD [facility] [phone] [id number] Follow-up Instructions Return in about 3 months (around 4/11/2018). Upcoming Health Maintenance Date Due  
 FOOT EXAM Q1 1/3/1955 EYE EXAM RETINAL OR DILATED Q1 1/3/1957 DTaP/Tdap/Td series (1 - Tdap) 1/3/1968 FOBT Q 1 YEAR AGE 50-75 1/3/1997 ZOSTER VACCINE AGE 60> 11/3/2006 GLAUCOMA SCREENING Q2Y 1/3/2012 Pneumococcal 65+ Low/Medium Risk (1 of 2 - PCV13) 1/3/2012 HEMOGLOBIN A1C Q6M 4/11/2018 MICROALBUMIN Q1 10/11/2018 LIPID PANEL Q1 10/11/2018 MEDICARE YEARLY EXAM 10/12/2018 Allergies as of 1/11/2018  Review Complete On: 1/11/2018 By: Severiano Dubon MD  
 No Known Allergies Current Immunizations  Never Reviewed Name Date Influenza High Dose Vaccine PF 10/11/2017 Not reviewed this visit You Were Diagnosed With   
  
 Codes Comments Controlled type 2 diabetes mellitus without complication, without long-term current use of insulin ([facility]    -  Primary ICD-10-CM: E11.9 ICD-9-CM: 250.00 Essential hypertension     ICD-10-CM: I10 
ICD-9-CM: 401.9 Pure hypercholesterolemia     ICD-10-CM: E78.00 ICD-9-CM: 272.0 Anxiety and depression     ICD-10-CM: F41.8 ICD-9-CM: 300.00, 311 Vitals BP Pulse Temp Resp Height(growth percentile) Weight(growth percentile) 138/88 64 98.2 °F (36.8 °C) 15 5' 8.5\" (1.74 m) 198 lb (89.8 kg) SpO2 BMI Smoking Status 99% 29.67 kg/m2 Current Every Day Smoker BMI and BSA Data Body Mass Index Body Surface Area  
 29.67 kg/m 2 2.08 m 2 Preferred Pharmacy Pharmacy Name Phone [place] [phone] - [facility], [facility] AT [address] [place] [address] 80 [phone] Your Updated Medication List  
  
   
 This list is accurate as of: 1/11/18  1:40 PM.  Always use your most recent med list.  
  
  
  
  
 ALPRAZolam 0.5 mg tablet Commonly known as:  XANAX  
TAKE 1 TABLET BY MOUTH THREE TIMES DAILY AS NEEDED FOR ANXIETY  
  
 aspirin delayed-release 81 mg tablet Take  by mouth daily. atenolol 100 mg tablet Commonly known as:  TENORMIN  
TAKE 1 TABLET BY MOUTH EVERY DAY  
  
 benazepril 40 mg tablet Commonly known as:  LOTENSIN  
TK 1 T PO QD.  
  
 fenofibrate 160 mg tablet Commonly known as:  LOFIBRA TAKE 1 TABLET BY MOUTH EVERY DAY WITH A MEAL  
  
 glipiZIDE SR 10 mg CR tablet Commonly known as:  GLUCOTROL XL  
TAKE 1 TABLET BY MOUTH EVERY DAY  
  
 metFORMIN 500 mg tablet Commonly known as:  GLUCOPHAGE  
TAKE 2 TABLETS BY MOUTH TWICE DAILY WITH MEAL  
  
 oxyCODONE IR 5 mg immediate release tablet Commonly known as:  ROXICODONE  
TK 1 T PO  TID FOR 30 DAYS  
  
 simvastatin 40 mg tablet Commonly known as:  ZOCOR Take 1 Tab by mouth nightly. * venlafaxine- mg capsule Commonly known as:  EFFEXOR-XR Take 1 Cap by mouth daily. * venlafaxine- mg capsule Commonly known as:  EFFEXOR-XR  
TAKE 1 CAPSULE BY MOUTH EVERY DAY IN THE EVENING WITH FOOD * Notice: This list has 2 medication(s) that are the same as other medications prescribed for you. Read the directions carefully, and ask your doctor or other care provider to review them with you. We Performed the Following AMB POC HEMOGLOBIN A1C [02224 CPT(R)] Follow-up Instructions Return in about 3 months (around 4/11/2018). Patient Instructions Learning About Diabetes Food Guidelines Your Care Instructions Meal planning is important to manage diabetes. It helps keep your blood sugar at a target level (which you set with your doctor). You don't have to eat special foods.  You can eat what your family eats, including sweets once in a while. But you do have to pay attention to how often you eat and how much you eat of certain foods. You may want to work with a dietitian or a certified diabetes educator (CDE) to help you plan meals and snacks. A dietitian or CDE can also help you lose weight if that is one of your goals. What should you know about eating carbs? Managing the amount of carbohydrate (carbs) you eat is an important part of healthy meals when you have diabetes. Carbohydrate is found in many foods. · Learn which foods have carbs. And learn the amounts of carbs in different foods. ¨ Bread, cereal, pasta, and rice have about 15 grams of carbs in a serving. A serving is 1 slice of bread (1 ounce), ½ cup of cooked cereal, or 1/3 cup of cooked pasta or rice. ¨ Fruits have 15 grams of carbs in a serving. A serving is 1 small fresh fruit, such as an apple or orange; ½ of a banana; ½ cup of cooked or canned fruit; ½ cup of fruit juice; 1 cup of melon or raspberries; or 2 tablespoons of dried fruit. ¨ Milk and no-sugar-added yogurt have 15 grams of carbs in a serving. A serving is 1 cup of milk or 2/3 cup of no-sugar-added yogurt. ¨ Starchy vegetables have 15 grams of carbs in a serving. A serving is ½ cup of mashed potatoes or sweet potato; 1 cup winter squash; ½ of a small baked potato; ½ cup of cooked beans; or ½ cup cooked corn or green peas. · Learn how much carbs to eat each day and at each meal. A dietitian or CDE can teach you how to keep track of the amount of carbs you eat. This is called carbohydrate counting. · If you are not sure how to count carbohydrate grams, use the Plate Method to plan meals. It is a good, quick way to make sure that you have a balanced meal. It also helps you spread carbs throughout the day. ¨ Divide your plate by types of foods.  Put non-starchy vegetables on half the plate, meat or other protein food on one-quarter of the plate, and a grain or starchy vegetable in the final quarter of the plate. To this you can add a small piece of fruit and 1 cup of milk or yogurt, depending on how many carbs you are supposed to eat at a meal. 
· Try to eat about the same amount of carbs at each meal. Do not \"save up\" your daily allowance of carbs to eat at one meal. 
· Proteins have very little or no carbs per serving. Examples of proteins are beef, chicken, turkey, fish, eggs, tofu, cheese, cottage cheese, and peanut butter. A serving size of meat is 3 ounces, which is about the size of a deck of cards. Examples of meat substitute serving sizes (equal to 1 ounce of meat) are 1/4 cup of cottage cheese, 1 egg, 1 tablespoon of peanut butter, and ½ cup of tofu. How can you eat out and still eat healthy? · Learn to estimate the serving sizes of foods that have carbohydrate. If you measure food at home, it will be easier to estimate the amount in a serving of restaurant food. · If the meal you order has too much carbohydrate (such as potatoes, corn, or baked beans), ask to have a low-carbohydrate food instead. Ask for a salad or green vegetables. · If you use insulin, check your blood sugar before and after eating out to help you plan how much to eat in the future. · If you eat more carbohydrate at a meal than you had planned, take a walk or do other exercise. This will help lower your blood sugar. What else should you know? · Limit saturated fat, such as the fat from meat and dairy products. This is a healthy choice because people who have diabetes are at higher risk of heart disease. So choose lean cuts of meat and nonfat or low-fat dairy products. Use olive or canola oil instead of butter or shortening when cooking. · Don't skip meals. Your blood sugar may drop too low if you skip meals and take insulin or certain medicines for diabetes. · Check with your doctor before you drink alcohol.  Alcohol can cause your blood sugar to drop too low. Alcohol can also cause a bad reaction if you take certain diabetes medicines. Follow-up care is a key part of your treatment and safety. Be sure to make and go to all appointments, and call your doctor if you are having problems. It's also a good idea to know your test results and keep a list of the medicines you take. Where can you learn more? Go to http://dariusz-stanley.info/. Enter Z843 in the search box to learn more about \"Learning About Diabetes Food Guidelines. \" Current as of: March 13, 2017 Content Version: 11.4 © 8119-5828 [employer]. Care instructions adapted under license by [employer] (which disclaims liability or warranty for this information). If you have questions about a medical condition or this instruction, always ask your healthcare professional. [address] any warranty or liability for your use of this information. Introducing \[facility] [place] [facility]\"" & HEALTH SERVICES! Dear Mikey Morgan: Thank you for requesting a [employer] account. Our records indicate that you already have an active [employer] account. You can access your account anytime at [email or web address] [email or web address] Did you know that you can access your hospital and ER discharge instructions at any time in [employer]? You can also review all of your test results from your hospital stay or ER visit. Additional Information If you have questions, please visit the Frequently Asked Questions section of the [employer] website at [email or web address] Remember, [employer] is NOT to be used for urgent needs. For medical emergencies, dial 911. Now available from your iPhone and Android! Please provide this summary of care documentation to your next provider. Your primary care clinician is listed as [address]. If you have any questions after today's visit, please call [phone].

## 2018-01-11 NOTE — PATIENT INSTRUCTIONS

## 2018-01-11 NOTE — PROGRESS NOTES
Chief Complaint   Patient presents with    Anxiety     3 month f/u    Depression     3 month f/u     1. Have you been to the ER, urgent care clinic since your last visit? Hospitalized since your last visit? No    2. Have you seen or consulted any other health care providers outside of the [address] [place] [address] since your last visit? Include any pap smears or colon screening.  No

## 2018-01-17 DIAGNOSIS — F32.A ANXIETY AND DEPRESSION: ICD-10-CM

## 2018-01-17 DIAGNOSIS — F41.9 ANXIETY AND DEPRESSION: ICD-10-CM

## 2018-01-17 RX ORDER — BENAZEPRIL HYDROCHLORIDE 40 MG/1
TABLET ORAL
Refills: 1 | Status: CANCELLED | OUTPATIENT
Start: 2018-01-17

## 2018-01-17 NOTE — TELEPHONE ENCOUNTER
This patient contacted office for the following prescriptions to be filled:    Medication requested :   Requested Prescriptions     Pending Prescriptions Disp Refills    benazepril (LOTENSIN) 40 mg tablet  1    ALPRAZolam (XANAX) 0.5 mg tablet 90 Tab 0     PCP: Dr. [place] or Print: [facility]   Mail order or Local pharmacy: [phone]    Scheduled appointment if not seen by current providers in office: LOV 1/11/18, next appt 4/11/18

## 2018-01-18 DIAGNOSIS — F41.9 ANXIETY AND DEPRESSION: ICD-10-CM

## 2018-01-18 DIAGNOSIS — F32.A ANXIETY AND DEPRESSION: ICD-10-CM

## 2018-01-18 RX ORDER — ALPRAZOLAM 0.5 MG/1
TABLET ORAL
Qty: 30 TAB | Refills: 0 | Status: SHIPPED | OUTPATIENT
Start: 2018-01-18 | End: 2018-01-19 | Stop reason: SDUPTHER

## 2018-01-19 DIAGNOSIS — F32.A ANXIETY AND DEPRESSION: ICD-10-CM

## 2018-01-19 DIAGNOSIS — I10 ESSENTIAL HYPERTENSION: Primary | ICD-10-CM

## 2018-01-19 DIAGNOSIS — F41.9 ANXIETY AND DEPRESSION: ICD-10-CM

## 2018-01-19 RX ORDER — BENAZEPRIL HYDROCHLORIDE 40 MG/1
TABLET ORAL
Qty: 90 TAB | Refills: 1 | Status: SHIPPED | OUTPATIENT
Start: 2018-01-19 | End: 2018-07-09 | Stop reason: SDUPTHER

## 2018-01-19 RX ORDER — ALPRAZOLAM 0.5 MG/1
TABLET ORAL
Qty: 60 TAB | Refills: 0 | Status: SHIPPED | OUTPATIENT
Start: 2018-01-19 | End: 2018-02-20

## 2018-01-19 RX ORDER — ALPRAZOLAM 0.5 MG/1
TABLET ORAL
Qty: 90 TAB | Refills: 0 | OUTPATIENT
Start: 2018-01-19

## 2018-02-05 DIAGNOSIS — F32.A ANXIETY AND DEPRESSION: ICD-10-CM

## 2018-02-05 DIAGNOSIS — F41.9 ANXIETY AND DEPRESSION: ICD-10-CM

## 2018-02-05 DIAGNOSIS — E78.00 PURE HYPERCHOLESTEROLEMIA: ICD-10-CM

## 2018-02-05 DIAGNOSIS — E78.5 HYPERLIPIDEMIA, UNSPECIFIED HYPERLIPIDEMIA TYPE: ICD-10-CM

## 2018-02-05 RX ORDER — GLIPIZIDE 10 MG/1
TABLET, FILM COATED, EXTENDED RELEASE ORAL
Qty: 90 TAB | Refills: 0 | Status: SHIPPED | OUTPATIENT
Start: 2018-02-05 | End: 2018-04-12 | Stop reason: SDUPTHER

## 2018-02-05 NOTE — TELEPHONE ENCOUNTER
From: Boaz Corral  To: Avelino Ayers MD  Sent: 2/5/2018 1:09 AM EST  Subject: Medication Renewal Request    Original authorizing provider: MD Boaz Bowman would like a refill of the following medications:  glipiZIDE SR (GLUCOTROL XL) 10 mg CR tablet Avelino Ayers MD]  fenofibrate (LOFIBRA) 160 mg tablet Avelino Ayers MD]  simvastatin (ZOCOR) 40 mg tablet Avelino Ayers MD]  ALPRAZolam Hazeline Villanueva) 0.5 mg tablet Avelino Ayers MD]    [facility]  AT [address] [place] [address] ( 16) & MAIN    Comment:

## 2018-02-19 RX ORDER — SIMVASTATIN 40 MG/1
40 TABLET, FILM COATED ORAL
Qty: 90 TAB | Refills: 0 | OUTPATIENT
Start: 2018-02-19

## 2018-02-19 RX ORDER — ALPRAZOLAM 0.5 MG/1
TABLET ORAL
Qty: 60 TAB | Refills: 0 | OUTPATIENT
Start: 2018-02-19

## 2018-02-19 RX ORDER — GLIPIZIDE 10 MG/1
TABLET, FILM COATED, EXTENDED RELEASE ORAL
Qty: 90 TAB | Refills: 0 | OUTPATIENT
Start: 2018-02-19

## 2018-02-19 RX ORDER — FENOFIBRATE 160 MG/1
TABLET ORAL
Qty: 90 TAB | Refills: 5 | OUTPATIENT
Start: 2018-02-19

## 2018-02-20 ENCOUNTER — HOSPITAL ENCOUNTER (OUTPATIENT)
Dept: LAB | Age: 73
Discharge: HOME | End: 2018-02-20
Payer: MEDICARE

## 2018-02-20 ENCOUNTER — OFFICE VISIT (OUTPATIENT)
Dept: BEHAVIORAL/MENTAL HEALTH CLINIC | Age: 73
End: 2018-02-20

## 2018-02-20 VITALS
SYSTOLIC BLOOD PRESSURE: 145 MMHG | RESPIRATION RATE: 16 BRPM | HEART RATE: 76 BPM | OXYGEN SATURATION: 98 % | TEMPERATURE: 97.9 F | BODY MASS INDEX: 29.62 KG/M2 | HEIGHT: 69 IN | DIASTOLIC BLOOD PRESSURE: 64 MMHG | WEIGHT: 200 LBS

## 2018-02-20 DIAGNOSIS — F41.1 GAD (GENERALIZED ANXIETY DISORDER): ICD-10-CM

## 2018-02-20 DIAGNOSIS — Z01.89 ROUTINE LAB DRAW: ICD-10-CM

## 2018-02-20 DIAGNOSIS — F33.2 MDD (MAJOR DEPRESSIVE DISORDER), RECURRENT SEVERE, WITHOUT PSYCHOSIS (HCC): ICD-10-CM

## 2018-02-20 DIAGNOSIS — F33.2 MDD (MAJOR DEPRESSIVE DISORDER), RECURRENT SEVERE, WITHOUT PSYCHOSIS (HCC): Primary | ICD-10-CM

## 2018-02-20 LAB
ALBUMIN SERPL-MCNC: 4.3 G/DL (ref 3.4–5)
ALBUMIN/GLOB SERPL: 1.7 {RATIO} (ref 0.8–1.7)
ALP SERPL-CCNC: 41 U/L (ref 45–117)
ALT SERPL-CCNC: 48 U/L (ref 16–61)
ANION GAP SERPL CALC-SCNC: 9 MMOL/L (ref 3–18)
AST SERPL-CCNC: 36 U/L (ref 15–37)
BASOPHILS # BLD: 0 K/UL (ref 0–0.06)
BASOPHILS NFR BLD: 1 % (ref 0–2)
BILIRUB SERPL-MCNC: 0.5 MG/DL (ref 0.2–1)
BUN SERPL-MCNC: 5 MG/DL (ref 7–18)
BUN/CREAT SERPL: 5 (ref 12–20)
CALCIUM SERPL-MCNC: 8.9 MG/DL (ref 8.5–10.1)
CHLORIDE SERPL-SCNC: 102 MMOL/L (ref 100–108)
CK SERPL-CCNC: 66 U/L (ref 39–308)
CO2 SERPL-SCNC: 28 MMOL/L (ref 21–32)
CREAT SERPL-MCNC: 1.03 MG/DL (ref 0.6–1.3)
DIFFERENTIAL METHOD BLD: ABNORMAL
EOSINOPHIL # BLD: 0.2 K/UL (ref 0–0.4)
EOSINOPHIL NFR BLD: 2 % (ref 0–5)
ERYTHROCYTE [DISTWIDTH] IN BLOOD BY AUTOMATED COUNT: 13.1 % (ref 11.6–14.5)
GLOBULIN SER CALC-MCNC: 2.6 G/DL (ref 2–4)
GLUCOSE SERPL-MCNC: 178 MG/DL (ref 74–99)
HCT VFR BLD AUTO: 43.1 % (ref 36–48)
HGB BLD-MCNC: 14.1 G/DL (ref 13–16)
LYMPHOCYTES # BLD: 3 K/UL (ref 0.9–3.6)
LYMPHOCYTES NFR BLD: 38 % (ref 21–52)
MCH RBC QN AUTO: 30.3 PG (ref 24–34)
MCHC RBC AUTO-ENTMCNC: 32.7 G/DL (ref 31–37)
MCV RBC AUTO: 92.7 FL (ref 74–97)
MONOCYTES # BLD: 0.5 K/UL (ref 0.05–1.2)
MONOCYTES NFR BLD: 7 % (ref 3–10)
NEUTS SEG # BLD: 4.1 K/UL (ref 1.8–8)
NEUTS SEG NFR BLD: 52 % (ref 40–73)
PLATELET # BLD AUTO: 152 K/UL (ref 135–420)
PMV BLD AUTO: 11.7 FL (ref 9.2–11.8)
POTASSIUM SERPL-SCNC: 4.1 MMOL/L (ref 3.5–5.5)
PROT SERPL-MCNC: 6.9 G/DL (ref 6.4–8.2)
RBC # BLD AUTO: 4.65 M/UL (ref 4.7–5.5)
SODIUM SERPL-SCNC: 139 MMOL/L (ref 136–145)
TSH SERPL DL<=0.05 MIU/L-ACNC: 1.81 UIU/ML (ref 0.36–3.74)
WBC # BLD AUTO: 7.8 K/UL (ref 4.6–13.2)

## 2018-02-20 PROCEDURE — 85025 COMPLETE CBC W/AUTO DIFF WBC: CPT | Performed by: INTERNAL MEDICINE

## 2018-02-20 PROCEDURE — 80053 COMPREHEN METABOLIC PANEL: CPT | Performed by: INTERNAL MEDICINE

## 2018-02-20 PROCEDURE — 84443 ASSAY THYROID STIM HORMONE: CPT | Performed by: INTERNAL MEDICINE

## 2018-02-20 PROCEDURE — 82550 ASSAY OF CK (CPK): CPT | Performed by: INTERNAL MEDICINE

## 2018-02-20 RX ORDER — CLONAZEPAM 0.5 MG/1
0.5 TABLET ORAL 2 TIMES DAILY
Qty: 60 TAB | Refills: 0 | Status: SHIPPED | OUTPATIENT
Start: 2018-02-20 | End: 2018-03-20

## 2018-02-20 RX ORDER — MIRTAZAPINE 15 MG/1
TABLET, FILM COATED ORAL
Qty: 30 TAB | Refills: 1 | Status: SHIPPED | OUTPATIENT
Start: 2018-02-20 | End: 2018-03-20

## 2018-02-20 NOTE — MR AVS SNAPSHOT
[address] [place] [address] [place] [address] [place] [address] 
[phone] Patient: Chloe White MRN: [id number] Visit Information Date & Time Provider Department Dept. Phone Encounter #  
 2/20/2018 10:30 AM Lorena Morgan  [address] [id number] Follow-up Instructions Return in about 4 weeks (around 3/20/2018), or if symptoms worsen or fail to improve, for DEPRESSSION, ANXIETY. Your Appointments 4/11/2018  1:00 PM  
ROUTINE CARE with Shanique Whittington MD  
[facility] [address] [facility]) Appt Note: 3 month f/u  
 [address] [facility] [address] [place] [address]  
[phone]  
  
   
 [facility] [address] [place] [address] Upcoming Health Maintenance Date Due  
 FOOT EXAM Q1 1/3/1955 EYE EXAM RETINAL OR DILATED Q1 1/3/1957 DTaP/Tdap/Td series (1 - Tdap) 1/3/1968 FOBT Q 1 YEAR AGE 50-75 1/3/1997 ZOSTER VACCINE AGE 60> 11/3/2006 GLAUCOMA SCREENING Q2Y 1/3/2012 Pneumococcal 65+ Low/Medium Risk (1 of 2 - PCV13) 1/3/2012 HEMOGLOBIN A1C Q6M 7/11/2018 MICROALBUMIN Q1 10/11/2018 LIPID PANEL Q1 10/11/2018 MEDICARE YEARLY EXAM 10/12/2018 Allergies as of 2/20/2018  Review Complete On: 2/20/2018 By: Lorena Morgan MD  
 No Known Allergies Current Immunizations  Never Reviewed Name Date Influenza High Dose Vaccine PF 10/11/2017 Not reviewed this visit You Were Diagnosed With   
  
 Codes Comments MDD (major depressive disorder), recurrent severe, without psychosis ([facility] [address]    -  Primary ICD-10-CM: F33.2 ICD-9-CM: 296.33   
 TACHO (generalized anxiety disorder)     ICD-10-CM: F41.1 ICD-9-CM: 300.02 Vitals BP Pulse Temp Resp Height(growth percentile) Weight(growth percentile) 145/64 76 97.9 °F (36.6 °C) (Oral) 16 5' 8.5\" (1.74 m) 200 lb (90.7 kg) SpO2 BMI Smoking Status 98% 29.97 kg/m2 Current Every Day Smoker Vitals History BMI and BSA Data Body Mass Index Body Surface Area  
 29.97 kg/m 2 2.09 m 2 Preferred Pharmacy Pharmacy Name Phone [facility], [address] [place] [address]  [phone] Your Updated Medication List  
  
   
This list is accurate as of: 2/20/18 11:29 AM.  Always use your most recent med list.  
  
  
  
  
 aspirin delayed-release 81 mg tablet Take  by mouth daily. atenolol 100 mg tablet Commonly known as:  TENORMIN  
TAKE 1 TABLET BY MOUTH EVERY DAY  
  
 benazepril 40 mg tablet Commonly known as:  LOTENSIN  
TK 1 T PO QD.  
  
 clonazePAM 0.5 mg tablet Commonly known as:  Adaline Apley Take 1 Tab by mouth two (2) times a day for 30 days. Max Daily Amount: 1 mg. fenofibrate 160 mg tablet Commonly known as:  LOFIBRA TAKE 1 TABLET BY MOUTH EVERY DAY WITH A MEAL  
  
 glipiZIDE SR 10 mg CR tablet Commonly known as:  GLUCOTROL XL  
TAKE 1 TABLET BY MOUTH EVERY DAY  
  
 metFORMIN 500 mg tablet Commonly known as:  GLUCOPHAGE  
TAKE 2 TABLETS BY MOUTH TWICE DAILY WITH MEAL  
  
 mirtazapine 15 mg tablet Commonly known as:  Doc Jadon Take 1/2 tab PO q HS for 5 days then increase to 1 tab PO q HS  
  
 simvastatin 40 mg tablet Commonly known as:  ZOCOR Take 1 Tab by mouth nightly. venlafaxine- mg capsule Commonly known as:  EFFEXOR-XR Take 1 Cap by mouth daily. Prescriptions Printed Refills  
 clonazePAM (KLONOPIN) 0.5 mg tablet 0 Sig: Take 1 Tab by mouth two (2) times a day for 30 days. Max Daily Amount: 1 mg. Class: Print Route: Oral  
  
Prescriptions Sent to Pharmacy Refills  
 mirtazapine (REMERON) 15 mg tablet 1 Sig: Take 1/2 tab PO q HS for 5 days then increase to 1 tab PO q HS  Class: Normal  
 Pharmacy: [facility] [address], [address] [place] [address]  Ph #: [phone] We Performed the Following REFERRAL TO PSYCHOLOGY [UED02 Custom] Comments:  
 73 y/o male with h/o MDD recurrent severe w/o psychosis, TACHO in need of TALK THERAPY [place] news preference Not [facility] Follow-up Instructions Return in about 4 weeks (around 3/20/2018), or if symptoms worsen or fail to improve, for DEPRESSSION, ANXIETY. To-Do List   
 02/20/2018 Lab:  CBC WITH AUTOMATED DIFF   
  
 02/20/2018 Lab:  CK   
  
 02/20/2018 Lab:  [facility] 3RD GENERATION Referral Information Referral ID Referred By Referred To  
  
 [id number] Bernardo Giordano Not Available Visits Status Start Date End Date 1 New Request 2/20/18 2/20/19 If your referral has a status of pending review or denied, additional information will be sent to support the outcome of this decision. Patient Instructions FOR ANXIETY AND DEPRESSION: 
 
LIFESTYLE CHANGES:  
START EATINGG THREE MEALS A DAY STARTING WITH BREAKFAST 
START DRINKING 2 LITERS OF WATER PER DAY (65 OUNCES) EXERCISE 30 MINUTES A DAY, STAIRS ARE OKAY INCREASE SOCIAL SUPPORTS, CONSIDER JOINING SUPPORT GROUP 
TALK MORE TO YOUR SON ABOUT YOUR MOOD/STRESS/ANXIETY SYMPTOMS AND LEVEL OF DISTRESS 
FOR SLEEP TALK TO PCP DR. CARR ABOUT GETTING A SLEEP STUDY AND ABOUT YOUR MUSCLE CRAMPS MEDICATIONS: 
FOR MOOD/SLEEP/APPETITE START TAKING MIRTAZAPINE 7.5 MG (1/2 TAB 15 MG) NIGHTLY FOR 5 DAYS THEN INCREASE TO 15 MG NIGHTLY (1 TAB) START TAKING CLONAZEPAM 0.5 MG TWICE A DAY, IF TOO TIRED IN MORNING START TAKING 1 MG AT NIGHT 
DO NOT TAKE XANAX ANYMORE, THIS MEDICINE IS THE REPLACEMENT 
CONTINUE TAKING EFFEXOR 150MG XR FOR ANXIETY 
IF YOU DEVELOP FEVERS, CHILLS, MUSCLE TREMORS, MUSCLE SPASMS, TREMULOUSNESS PLEASE STOP REMERON/EFFEXOR AND GO TO HOSPITAL, THIS IS CALL SEROTONIN SYNDROME If you are having thoughts of harming yourself or others please report to local hospital for evaluation or call suicide hotline [phone] RETURN IN 4 WEEKS FOR FOLLOW UP We are sending a referral to [address] [place] [address] [address] . You should be called about this in 1-2 weeks. If no word in 2 weeks please give us a call to follow up We will let you know the results of your blood and urine test when they come in. We will call if abnormal and send a letter if normal. 
 
 
  
Recovering From Depression: Care Instructions Your Care Instructions Taking good care of yourself is important as you recover from depression. In time, your symptoms will fade as your treatment takes hold. Do not give up. Instead, focus your energy on getting better. Your mood will improve. It just takes some time. Focus on things that can help you feel better, such as being with friends and family, eating well, and getting enough rest. But take things slowly. Do not do too much too soon. You will begin to feel better gradually. Follow-up care is a key part of your treatment and safety. Be sure to make and go to all appointments, and call your doctor if you are having problems. It's also a good idea to know your test results and keep a list of the medicines you take. How can you care for yourself at home? Be realistic · If you have a large task to do, break it up into smaller steps you can handle, and just do what you can. · You may want to put off important decisions until your depression has lifted. If you have plans that will have a major impact on your life, such as marriage, divorce, or a job change, try to wait a bit. Talk it over with friends and loved ones who can help you look at the overall picture first. 
· Reaching out to people for help is important. Do not isolate yourself. Let your family and friends help you. Find someone you can trust and confide in, and talk to that person. · Be patient, and be kind to yourself. Remember that depression is not your fault and is not something you can overcome with willpower alone. Treatment is necessary for depression, just like for any other illness. Feeling better takes time, and your mood will improve little by little. Stay active · Stay busy and get outside. Take a walk, or try some other light exercise. · Talk with your doctor about an exercise program. Exercise can help with mild depression. · Go to a movie or concert. Take part in a [employer] activity or other social gathering. Go to a [employer] game. · Ask a friend to have dinner with you. Take care of yourself · Eat a balanced diet with plenty of fresh fruits and vegetables, whole grains, and lean protein. If you have lost your appetite, eat small snacks rather than large meals. · Avoid drinking alcohol or using illegal drugs. Do not take medicines that have not been prescribed for you. They may interfere with medicines you may be taking for depression, or they may make your depression worse. · Take your medicines exactly as they are prescribed. You may start to feel better within 1 to 3 weeks of taking antidepressant medicine. But it can take as many as 6 to 8 weeks to see more improvement. If you have questions or concerns about your medicines, or if you do not notice any improvement by 3 weeks, talk to your doctor. · If you have any side effects from your medicine, tell your doctor. Antidepressants can make you feel tired, dizzy, or nervous. Some people have dry mouth, constipation, headaches, sexual problems, or diarrhea. Many of these side effects are mild and will go away on their own after you have been taking the medicine for a few weeks. Some may last longer. Talk to your doctor if side effects are bothering you too much. You might be able to try a different medicine. · Get enough sleep. If you have problems sleeping: ¨ Go to bed at the same time every night, and get up at the same time every morning. ¨ Keep your bedroom dark and quiet. ¨ Do not exercise after 5:00 p.m. ¨ Avoid drinks with caffeine after 5:00 p.m. · Avoid sleeping pills unless they are prescribed by the doctor treating your depression. Sleeping pills may make you groggy during the day, and they may interact with other medicine you are taking. · If you have any other illnesses, such as diabetes, heart disease, or high blood pressure, make sure to continue with your treatment. Tell your doctor about all of the medicines you take, including those with or without a prescription. · Keep the numbers for these national suicide hotlines: 9-894-624-TALK (0-498.276.6698) and 9-718-BMGOXJQ (9-847.343.8902). If you or someone you know talks about suicide or feeling hopeless, get help right away. When should you call for help? Call 911 anytime you think you may need emergency care. For example, call if: 
? · You feel like hurting yourself or someone else. ? · Someone you know has depression and is about to attempt or is attempting suicide. ?Call your doctor now or seek immediate medical care if: 
? · You hear voices. ? · Someone you know has depression and: 
¨ Starts to give away his or her possessions. ¨ Uses illegal drugs or drinks alcohol heavily. ¨ Talks or writes about death, including writing suicide notes or talking about guns, knives, or pills. ¨ Starts to spend a lot of time alone. ¨ Acts very aggressively or suddenly appears calm. ? Watch closely for changes in your health, and be sure to contact your doctor if: 
? · You do not get better as expected. Where can you learn more? Go to http://dariusz-stanley.info/. Enter O293 in the search box to learn more about \"Recovering From Depression: Care Instructions. \" Current as of: May 12, 2017 Content Version: 11.4 © 1220-9532 [employer]. Care instructions adapted under license by [employer] (which disclaims liability or warranty for this information). If you have questions about a medical condition or this instruction, always ask your healthcare professional. [address] any warranty or liability for your use of this information. Mirtazapine (By mouth) Mirtazapine (qbp-JQN-a-peen) Treats depression. Brand Name(s): Remeron, Remeron Soltab There may be other brand names for this medicine. When This Medicine Should Not Be Used: This medicine is not right for everyone. Do not use it if you had an allergic reaction to mirtazapine. How to Use This Medicine:  
Tablet, Dissolving Tablet · Take your medicine as directed. Your dose may need to be changed several times to find what works best for you. Your doctor may tell you to take this medicine at bedtime, because it can make you sleepy. · You may need to take this medicine for several weeks before you begin to feel better. · Make sure your hands are dry before you handle the disintegrating tablet. Peel back the foil from the blister pack, then remove the tablet. Do not push the tablet through the foil. Place the tablet in your mouth. After it has melted, swallow or take a drink of water. Do not crush, split, or break the tablet. · This medicine should come with a Medication Guide. Ask your pharmacist for a copy if you do not have one. · Missed dose: Take a dose as soon as you remember. If it is almost time for your next dose, wait until then and take a regular dose. Do not take extra medicine to make up for a missed dose. · Store the medicine in a closed container at room temperature, away from heat, moisture, and direct light. Keep the orally disintegrating tablet in the original package until you are ready to take it. Drugs and Foods to Avoid: Ask your doctor or pharmacist before using any other medicine, including over-the-counter medicines, vitamins, and herbal products. · Do not use this medicine and an MAO inhibitor within 14 days of each other. · Some medicines can affect how mirtazapine works. Tell your doctor if you are using any of the following: 
¨ Buspirone, carbamazepine, cimetidine, diazepam, fentanyl, lithium, nefazodone, phenytoin, rifampicin, Daily's wort, tramadol, or tryptophan ¨ Other medicine to treat depression, a triptan medicine to treat migraine headaches, medicine to treat an infection, medicine to treat HIV, or a blood thinner (such as warfarin) · Do not drink alcohol while you are using this medicine. Warnings While Using This Medicine: · Tell your doctor if you are pregnant or breastfeeding, or if you have kidney disease, liver disease, glaucoma, high cholesterol, heart or blood vessel disease, or a history of seizures, heart attack, or stroke. Tell your doctor if you have phenylketonuria. · For some children, teenagers, and young adults, this medicine may increase mental or emotional problems. This may lead to thoughts of suicide and violence. Talk with your doctor right away if you have any thoughts or behavior changes that concern you. Tell your doctor if you or anyone in your family has a history of bipolar disorder or suicide attempts. · This medicine may cause the following problems: 
¨ Serotonin syndrome (may be life-threatening) ¨ Decreased white blood cells, which can affect your body's ability to fight an infection ¨ Low sodium levels in the blood · Do not stop using this medicine suddenly. Your doctor will need to slowly decrease your dose before you stop it completely. · This medicine may make you dizzy or drowsy. Do not drive or do anything else that could be dangerous until you know how this medicine affects you. Stand or sit up slowly if you feel lightheaded or dizzy. · Your doctor will do lab tests at regular visits to check on the effects of this medicine. Keep all appointments. · Keep all medicine out of the reach of children. Never share your medicine with anyone. Possible Side Effects While Using This Medicine:  
Call your doctor right away if you notice any of these side effects: · Allergic reaction: Itching or hives, swelling in your face or hands, swelling or tingling in your mouth or throat, chest tightness, trouble breathing · Anxiety, restlessness, fast heartbeat, fever, sweating, muscle spasms, nausea, vomiting, diarrhea, seeing or hearing things that are not there · Blistering, peeling, red skin rash · Eye pain, vision changes, seeing halos around lights · Confusion, weakness, muscle twitching · Feeling more excited or energetic than usual 
· Fever, chills, cough, sore throat, body aches · Thoughts of hurting yourself or others, worsening depression, unusual behaviors If you notice these less serious side effects, talk with your doctor: · Dry mouth, constipation · Increased appetite or weight gain · Sleepiness, tiredness If you notice other side effects that you think are caused by this medicine, tell your doctor. Call your doctor for medical advice about side effects. You may report side effects to FDA at 9-624-FDA-5863 © 2017 [facility] Information is for End User's use only and may not be sold, redistributed or otherwise used for commercial purposes. The above information is an  only. It is not intended as medical advice for individual conditions or treatments. Talk to your doctor, nurse or pharmacist before following any medical regimen to see if it is safe and effective for you. Clonazepam (By mouth) Clonazepam (dnik-WRL-i-claudine) Treats seizures and panic disorder. Brand Name(s): KlonoPIN There may be other brand names for this medicine. When This Medicine Should Not Be Used: This medicine is not right for everyone. Do not use it if you had an allergic reaction to clonazepam or similar medicines, or if you are pregnant or breastfeeding. How to Use This Medicine:  
Tablet, Dissolving Tablet · Take your medicine as directed. Your dose may need to be changed several times to find what works best for you. · Disintegrating tablet: Dry your hands before you handle the tablet. Place the tablet on your tongue and let it dissolve. · Tablet: Swallow whole with water. · This medicine should come with a Medication Guide. Ask your pharmacist for a copy if you do not have one. · Missed dose: Take a dose as soon as you remember. If it is almost time for your next dose, wait until then and take a regular dose. Do not take extra medicine to make up for a missed dose. · Store the medicine in a closed container at room temperature, away from heat, moisture, and direct light. Drugs and Foods to Avoid: Ask your doctor or pharmacist before using any other medicine, including over-the-counter medicines, vitamins, and herbal products. · Some medicines can affect how clonazepam works. Tell your doctor if you are using any of the following: ¨ Carbamazepine, phenobarbital, phenytoin ¨ Medicine to treat depression or mental health problems, including MAO inhibitors ¨ Medicine to treat fungal infections ¨ Phenothiazine medicine · Do not drink alcohol while you are using this medicine. · Tell your doctor if you use anything else that makes you sleepy. Some examples are allergy medicine, narcotic pain medicine, and alcohol. Warnings While Using This Medicine: · It is not safe to take this medicine during pregnancy. It could harm an unborn baby. Tell your doctor right away if you become pregnant. · Tell your doctor if you have kidney disease, liver disease, glaucoma, lung disease or breathing problems, porphyria, or a history of drug or alcohol abuse, depression, or mental health problems. · This medicine can increase thoughts of suicide. Tell your doctor right away if you start to feel depressed and have thoughts about hurting yourself. · This medicine can be habit-forming. Do not use more than your prescribed dose. Call your doctor if you think your medicine is not working. · Do not stop using this medicine suddenly. Your doctor will need to slowly decrease your dose before you stop it completely. · This medicine may make you dizzy or drowsy. Do not drive or do anything else that could be dangerous until you know how this medicine affects you. · Your doctor will do lab tests at regular visits to check on the effects of this medicine. Keep all appointments. · Keep all medicine out of the reach of children. Never share your medicine with anyone. Possible Side Effects While Using This Medicine:  
Call your doctor right away if you notice any of these side effects: · Allergic reaction: Itching or hives, swelling in your face or hands, swelling or tingling in your mouth or throat, chest tightness, trouble breathing · Confusion, memory problems · Depression, unusual moods or behavior, thoughts of hurting yourself · Extreme drowsiness, tiredness, or weakness, slow heartbeat, problems with coordination or walking · Worsening seizures If you notice these less serious side effects, talk with your doctor: · Cough, runny or stuffy nose, sore throat · Increased saliva If you notice other side effects that you think are caused by this medicine, tell your doctor. Call your doctor for medical advice about side effects. You may report side effects to FDA at 9-697-FDA-9595 © 2017 [address] Information is for End User's use only and may not be sold, redistributed or otherwise used for commercial purposes. The above information is an  only. It is not intended as medical advice for individual conditions or treatments.  Talk to your doctor, nurse or pharmacist before following any medical regimen to see if it is safe and effective for you. Introducing [facility]! Dear Joni Mulligan: Thank you for requesting a [employer] account. Our records indicate that you already have an active [employer] account. You can access your account anytime at [email or web address] Did you know that you can access your hospital and ER discharge instructions at any time in [employer]? You can also review all of your test results from your hospital stay or ER visit. Additional Information If you have questions, please visit the Frequently Asked Questions section of the [employer] website at [email or web address] Remember, [employer] is NOT to be used for urgent needs. For medical emergencies, dial 911. Now available from your iPhone and Android! Please provide this summary of care documentation to your next provider. Your primary care clinician is listed as [address]. If you have any questions after today's visit, please call [phone].

## 2018-02-20 NOTE — PROGRESS NOTES
Adult Psychiatry Initial Evaluation    Assessment, and Plan:      ASSESSMENT:   Mr. 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 Low-moderate risk for acute SI, moderate risk for chronic suicide. Risk factors include age, limited social supports, male gender, severe depression, thoughts of Passive SI. Protective factors include no access to gun, no h/o SA or suicidal thoughts active with plan. Son supportive and currently residing with him, spirituality. Discussed treatment of depressive and anxious sx with mirtazapine given age and associated lack of appetite and trouble sleeping, titrate to 15 mg daily, d/c xanax and start clonazepam with goal to titrate off when remeron more effective. Discussed r/b/se of medication changes proposed. Will order basic labs to r/o metabolic causes of symptoms and recommended discussing sleep study referral with PCP to r/o XIMENA given high risk based on HPI assessment. Referral to talk therapy placed and encouraged increase social supports, healthy diet and exercise. Will follow closely. 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 male presenting with anxiety. Patient came to psychiatry on a voluntarily basis. Chief Complaint:  Other (Consult );  Depression; and Anxiety      Stated Chief Complaint: \"Jonas Perez\" \"anxiety\"    History of Presenting Illness:      Anxiety/Mood: PCP has been prescribing xanax at his request for past year and was recently told by PCP that he needs a referral for further medication refills. Onset of distress anxiety life long, recent worsening 2 years ago. Associated with end of divorce from wife of 40 years \"without any warning. \" Since then communication \"once in a while\" with ex-wife. For past two years has felt that \"something terrible is going to happen and I just can't relax. \" For treatment of anxiety symptoms has been taking xanax 0.5 mg 2-3 x per day for past 7-8 years. Last dose was yesterday. History of not taking for 1 day and gets tremors. 2 tabs left. Chronic racing thoughts \"longer than I can remember. Always \"tense. \" No impulsivity. For past 2 weeks reports nearly daily trouble feeling nervous on edge, trouble controlling worry, worrying too much about different things, trouble relaxing, restlessness and feeling afraid, anhedonia, feeling down, trouble sleeping, poor appetite, low energy and passive SI. Denies active SI or suicide attempts or plans in past. Reports protective factors include \"bad psychological effect on my son. Couldn't do that. \" thoughts include \"i don't have much purpose anymore. \" No access to guns. Lives with son who is supportive. Insomnia: trouble falling sleep, staying asleep (nocturia wakes up) and sleeping too much during the day. STOP-BANG: + snoring, + day time fatigue, + HTN, , + AGE >50,  + male gender. 5/8 high risk for XIMENA  In past was on effexor 150mg XR, not helping anxiety at all, no help with depression, less irritable and less \"up tight\" and no longer has claustrophobia. Has been on effexor now for 10 years. No h/o higher dose. ROS: No h/o elevated energy with decreased need for sleep,  No mood swings. No PD/AVH. Denies h/o PTSD sx, harjeet, OCD, ADHD or hoarding or psychosis. Psychosocial Impact: Relationships    Past Psychiatric History:     Previous diagnoses: Depression and Anxiety 2001  Previous medication trials: Prozac not helpful. Effexor helpful. Xanax helpful  Previous therapy: never had  Previous psychiatric hospitalizations: No  Currently in treatment with: PCP. Other pertinent history: no h/o self harm    Violence History/Risk:     History of violence: No  Current access to firearm: No  Recent or current violent ideation: No    Suicidal Ideation and Behavior History/Risk:     Current suicidal thoughts: No  Previous suicide attempts: No  Previous self-injurious behavior/risky behavior: No  Previous suicidal ideation: passive SI 1 week ago  Access to stockpile pills: No  Impulsivity: No    Substance Abuse History   Recreational Drugs: No  Use of Alcohol: used to drink a lot of beer self medicating, stopped 2010  Tobacco Use: 1/2 ppd  Abuse of medications: No h/o  Legal Consequences of chemical use: No    History    Past Medical Hx, Surgical Hx, Family Medical Hx: Reviewed and updated in EMR as appropriate:    Family Psych HX:  Mother had depression     Medications and Allergies: Reviewed and updated in EMR as appropriate    Current Outpatient Prescriptions   Medication Sig    glipiZIDE SR (GLUCOTROL XL) 10 mg CR tablet TAKE 1 TABLET BY MOUTH EVERY DAY    ALPRAZolam (XANAX) 0.5 mg tablet TAKE 1 TABLET BY MOUTH bid AS NEEDED FOR ANXIETY. MAX DAILY AMOUNT: 1 TABLET.  benazepril (LOTENSIN) 40 mg tablet TK 1 T PO QD.    fenofibrate (LOFIBRA) 160 mg tablet TAKE 1 TABLET BY MOUTH EVERY DAY WITH A MEAL    simvastatin (ZOCOR) 40 mg tablet Take 1 Tab by mouth nightly.  atenolol (TENORMIN) 100 mg tablet TAKE 1 TABLET BY MOUTH EVERY DAY    metFORMIN (GLUCOPHAGE) 500 mg tablet TAKE 2 TABLETS BY MOUTH TWICE DAILY WITH MEAL    venlafaxine-SR (EFFEXOR-XR) 150 mg capsule TAKE 1 CAPSULE BY MOUTH EVERY DAY IN THE EVENING WITH FOOD    venlafaxine-SR (EFFEXOR-XR) 150 mg capsule Take 1 Cap by mouth daily.  aspirin delayed-release 81 mg tablet Take  by mouth daily.     oxyCODONE IR (ROXICODONE) 5 mg immediate release tablet TK 1 T PO  TID FOR 30 DAYS     No current facility-administered medications for this visit. Most Recent Vitals:       Visit Vitals    /64    Pulse 76    Temp 97.9 °F (36.6 °C) (Oral)    Resp 16    Ht 5' 8.5\" (1.74 m)    Wt 200 lb (90.7 kg)    SpO2 98%    BMI 29.97 kg/m2       Current Active Medical Issues     Patient Active Problem List   Diagnosis Code    Essential hypertension I10    Controlled type 2 diabetes mellitus without complication, without long-term current use of insulin (HCC) E11.9    Hyperlipidemia E78.5    Chronic left shoulder pain M25.512, G89.29    Pain management contract agreement [email or web address]    Anxiety and depression F41.8    Type 2 diabetes mellitus with nephropathy (HCC) E11.21    Recurrent depression (HCC) F33.9       Pertinent Labs and Studies     Results for Robina Elias (MRN [id number]) as of 2/20/2018 14:15   Ref. Range 10/11/2017 14:11 1/11/2018 16:39   Triglyceride Latest Ref Range: <150 MG/ (H)    Cholesterol, total Latest Ref Range: <200 MG/    HDL Cholesterol Latest Ref Range: 40 - 60 MG/DL 37 (L)    CHOL/HDL Ratio Latest Ref Range: 0 - 5.0   3.8    LDL, calculated Latest Ref Range: 0 - 100 MG/DL 42.6    VLDL, calculated Latest Units: MG/DL 62.4    Hemoglobin A1c, (calculated) Latest Ref Range: 4.2 - 5.6 % 8.0 (H)    Hemoglobin A1c (POC) Latest Units: %  8.3   Creatinine, urine Latest Ref Range: 30 - 125 mg/dL 118.09    Microalbumin,urine random Latest Ref Range: 0 - 3.0 MG/DL 6.00 (H)    Microalbumin/Creat. Ratio Latest Ref Range: 0 - 30 mg/g 51 (H)    Hepatitis C virus Ab Latest Ref Range: <0.80 Index 0.07    Hep C  virus Ab Interp.  Latest Ref Range: NEG   NEGATIVE    Hep C  virus Ab comment Latest Units:   Pend    Prostate Specific Ag Latest Ref Range: 0.0 - 4.0 ng/mL 0.8        Psychosocial History (Legal, Education, Occupation, Living Situation, etc.):     Education level: Masters Degree  Developmental Milestones: no delay  Childhood described as \"i can't complain, normal\"  Relationship with family members: \"excellent\"  Significant other relationships:  for 40 years, currently  2015  Children: one son  Support system: non one  Employment history: , retired 1996  Legal history: No  H/o abuse: No  Hobbies/Coping: take xanax, \"exercise doesn't work for [employer] 1 meal a day  Water intake 2-3 glasses  Coffee in AM    Mental Status Evaluation:     Appearance Elderly male dressed casually, good GH, good EC   Attitude Cooperative,    Behavior:  No PMA/R   Speech:  Normal, spontaenous   Mood:  \"tense\" appears euthymic with underlying anxiety   Affect:  Congruent and reactive   Thought Process:  Linear, goal directed   Thought Content:  As per [facility]   Fund of Knowledge Good   SI/HI/Psychosis Denies   Sensorium:  Alert   Cognition:  Grossly intact, not formally assessed   Insight:  fair   Judgment: fair                                  Impulse Control:  good     PHQ-9: 25, very difficult to extremely difficult  TACHO-7: 20, very difficult to extremely difficult      Acute risk for:    Danger to self:  Low-moderate  Danger to others: low  Grave disability:  low  Diagnosis:     Axis I: 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   [place] [phone]  [place] [phone]

## 2018-02-20 NOTE — PROGRESS NOTES
Chief Complaint   Patient presents with    Other     Consult     Depression    Anxiety     Patient has not seen any psy for above reasons.

## 2018-02-20 NOTE — PATIENT INSTRUCTIONS
FOR ANXIETY AND DEPRESSION:    LIFESTYLE CHANGES:   START EATINGG THREE MEALS A DAY STARTING WITH BREAKFAST  START DRINKING 2 LITERS OF WATER PER DAY (65 OUNCES)  EXERCISE 30 MINUTES A DAY, STAIRS ARE OKAY  INCREASE SOCIAL SUPPORTS, CONSIDER JOINING SUPPORT GROUP  TALK MORE TO YOUR SON ABOUT YOUR MOOD/STRESS/ANXIETY SYMPTOMS AND LEVEL OF DISTRESS  FOR SLEEP TALK TO PCP DR. CARR ABOUT GETTING A SLEEP STUDY AND ABOUT YOUR MUSCLE CRAMPS    MEDICATIONS:  FOR MOOD/SLEEP/APPETITE START TAKING MIRTAZAPINE 7.5 MG (1/2 TAB 15 MG) NIGHTLY FOR 5 DAYS THEN INCREASE TO 15 MG NIGHTLY (1 TAB)  START TAKING CLONAZEPAM 0.5 MG TWICE A DAY, IF TOO TIRED IN MORNING START TAKING 1 MG AT NIGHT  DO NOT TAKE XANAX ANYMORE, THIS MEDICINE IS THE REPLACEMENT  CONTINUE TAKING EFFEXOR 150MG XR FOR ANXIETY  IF YOU DEVELOP FEVERS, CHILLS, MUSCLE TREMORS, MUSCLE SPASMS, TREMULOUSNESS PLEASE STOP REMERON/EFFEXOR AND GO TO HOSPITAL, THIS IS CALL SEROTONIN SYNDROME    If you are having thoughts of harming yourself or others please report to local hospital for evaluation or call suicide hotline 6     RETURN IN 4 WEEKS FOR FOLLOW UP     We are sending a referral to [address] [place] [address] [facility] . You should be called about this in 1-2 weeks. If no word in 2 weeks please give us a call to follow up    We will let you know the results of your blood and urine test when they come in. We will call if abnormal and send a letter if normal.         Recovering From Depression: Care Instructions  Your Care Instructions    Taking good care of yourself is important as you recover from depression. In time, your symptoms will fade as your treatment takes hold. Do not give up. Instead, focus your energy on getting better. Your mood will improve. It just takes some time. Focus on things that can help you feel better, such as being with friends and family, eating well, and getting enough rest. But take things slowly. Do not do too much too soon.  You will begin to feel better gradually. Follow-up care is a key part of your treatment and safety. Be sure to make and go to all appointments, and call your doctor if you are having problems. It's also a good idea to know your test results and keep a list of the medicines you take. How can you care for yourself at home? Be realistic  · If you have a large task to do, break it up into smaller steps you can handle, and just do what you can. · You may want to put off important decisions until your depression has lifted. If you have plans that will have a major impact on your life, such as marriage, divorce, or a job change, try to wait a bit. Talk it over with friends and loved ones who can help you look at the overall picture first.  · Reaching out to people for help is important. Do not isolate yourself. Let your family and friends help you. Find someone you can trust and confide in, and talk to that person. · Be patient, and be kind to yourself. Remember that depression is not your fault and is not something you can overcome with willpower alone. Treatment is necessary for depression, just like for any other illness. Feeling better takes time, and your mood will improve little by little. Stay active  · Stay busy and get outside. Take a walk, or try some other light exercise. · Talk with your doctor about an exercise program. Exercise can help with mild depression. · Go to a movie or concert. Take part in a [employer] activity or other social gathering. Go to a ball game. · Ask a friend to have dinner with you. Take care of yourself  · Eat a balanced diet with plenty of fresh fruits and vegetables, whole grains, and lean protein. If you have lost your appetite, eat small snacks rather than large meals. · Avoid drinking alcohol or using illegal drugs. Do not take medicines that have not been prescribed for you. They may interfere with medicines you may be taking for depression, or they may make your depression worse.   · Take your medicines exactly as they are prescribed. You may start to feel better within 1 to 3 weeks of taking antidepressant medicine. But it can take as many as 6 to 8 weeks to see more improvement. If you have questions or concerns about your medicines, or if you do not notice any improvement by 3 weeks, talk to your doctor. · If you have any side effects from your medicine, tell your doctor. Antidepressants can make you feel tired, dizzy, or nervous. Some people have dry mouth, constipation, headaches, sexual problems, or diarrhea. Many of these side effects are mild and will go away on their own after you have been taking the medicine for a few weeks. Some may last longer. Talk to your doctor if side effects are bothering you too much. You might be able to try a different medicine. · Get enough sleep. If you have problems sleeping:  ¨ Go to bed at the same time every night, and get up at the same time every morning. ¨ Keep your bedroom dark and quiet. ¨ Do not exercise after 5:00 p.m. ¨ Avoid drinks with caffeine after 5:00 p.m. · Avoid sleeping pills unless they are prescribed by the doctor treating your depression. Sleeping pills may make you groggy during the day, and they may interact with other medicine you are taking. · If you have any other illnesses, such as diabetes, heart disease, or high blood pressure, make sure to continue with your treatment. Tell your doctor about all of the medicines you take, including those with or without a prescription. · Keep the numbers for these national suicide hotlines: 1-679-825-TALK (6-667.769.9567) and 3-109-VULWPKO (5-590.510.3925). If you or someone you know talks about suicide or feeling hopeless, get help right away. When should you call for help? Call 911 anytime you think you may need emergency care. For example, call if:  ? · You feel like hurting yourself or someone else. ? · Someone you know has depression and is about to attempt or is attempting suicide. ?Call your doctor now or seek immediate medical care if:  ? · You hear voices. ? · Someone you know has depression and:  ¨ Starts to give away his or her possessions. ¨ Uses illegal drugs or drinks alcohol heavily. ¨ Talks or writes about death, including writing suicide notes or talking about guns, knives, or pills. ¨ Starts to spend a lot of time alone. ¨ Acts very aggressively or suddenly appears calm. ? Watch closely for changes in your health, and be sure to contact your doctor if:  ? · You do not get better as expected. Where can you learn more? Go to http://[facility].info/. Enter H937 in the search box to learn more about \"Recovering From Depression: Care Instructions. \"  Current as of: May 12, 2017  Content Version: 11.4  © 4660-4844 [employer]. Care instructions adapted under license by [employer] (which disclaims liability or warranty for this information). If you have questions about a medical condition or this instruction, always ask your healthcare professional. [place] [address] any warranty or liability for your use of this information. Mirtazapine (By mouth)   Mirtazapine (rwf-CHB-o-peen)  Treats depression. Brand Name(s): Remeron, Remeron Soltab   There may be other brand names for this medicine. When This Medicine Should Not Be Used: This medicine is not right for everyone. Do not use it if you had an allergic reaction to mirtazapine. How to Use This Medicine:   Tablet, Dissolving Tablet  · Take your medicine as directed. Your dose may need to be changed several times to find what works best for you. Your doctor may tell you to take this medicine at bedtime, because it can make you sleepy. · You may need to take this medicine for several weeks before you begin to feel better. · Make sure your hands are dry before you handle the disintegrating tablet.  Peel back the foil from the blister pack, then remove the tablet. Do not push the tablet through the foil. Place the tablet in your mouth. After it has melted, swallow or take a drink of water. Do not crush, split, or break the tablet. · This medicine should come with a Medication Guide. Ask your pharmacist for a copy if you do not have one. · Missed dose: Take a dose as soon as you remember. If it is almost time for your next dose, wait until then and take a regular dose. Do not take extra medicine to make up for a missed dose. · Store the medicine in a closed container at room temperature, away from heat, moisture, and direct light. Keep the orally disintegrating tablet in the original package until you are ready to take it. Drugs and Foods to Avoid:   Ask your doctor or pharmacist before using any other medicine, including over-the-counter medicines, vitamins, and herbal products. · Do not use this medicine and an MAO inhibitor within 14 days of each other. · Some medicines can affect how mirtazapine works. Tell your doctor if you are using any of the following:  ¨ Buspirone, carbamazepine, cimetidine, diazepam, fentanyl, lithium, nefazodone, phenytoin, rifampicin, Daily's wort, tramadol, or tryptophan  ¨ Other medicine to treat depression, a triptan medicine to treat migraine headaches, medicine to treat an infection, medicine to treat HIV, or a blood thinner (such as warfarin)  · Do not drink alcohol while you are using this medicine. Warnings While Using This Medicine:   · Tell your doctor if you are pregnant or breastfeeding, or if you have kidney disease, liver disease, glaucoma, high cholesterol, heart or blood vessel disease, or a history of seizures, heart attack, or stroke. Tell your doctor if you have phenylketonuria. · For some children, teenagers, and young adults, this medicine may increase mental or emotional problems. This may lead to thoughts of suicide and violence.  Talk with your doctor right away if you have any thoughts or behavior changes that concern you. Tell your doctor if you or anyone in your family has a history of bipolar disorder or suicide attempts. · This medicine may cause the following problems:  ¨ Serotonin syndrome (may be life-threatening)  ¨ Decreased white blood cells, which can affect your body's ability to fight an infection  ¨ Low sodium levels in the blood  · Do not stop using this medicine suddenly. Your doctor will need to slowly decrease your dose before you stop it completely. · This medicine may make you dizzy or drowsy. Do not drive or do anything else that could be dangerous until you know how this medicine affects you. Stand or sit up slowly if you feel lightheaded or dizzy. · Your doctor will do lab tests at regular visits to check on the effects of this medicine. Keep all appointments. · Keep all medicine out of the reach of children. Never share your medicine with anyone. Possible Side Effects While Using This Medicine:   Call your doctor right away if you notice any of these side effects:  · Allergic reaction: Itching or hives, swelling in your face or hands, swelling or tingling in your mouth or throat, chest tightness, trouble breathing  · Anxiety, restlessness, fast heartbeat, fever, sweating, muscle spasms, nausea, vomiting, diarrhea, seeing or hearing things that are not there  · Blistering, peeling, red skin rash  · Eye pain, vision changes, seeing halos around lights  · Confusion, weakness, muscle twitching  · Feeling more excited or energetic than usual  · Fever, chills, cough, sore throat, body aches  · Thoughts of hurting yourself or others, worsening depression, unusual behaviors  If you notice these less serious side effects, talk with your doctor:   · Dry mouth, constipation  · Increased appetite or weight gain  · Sleepiness, tiredness  If you notice other side effects that you think are caused by this medicine, tell your doctor. Call your doctor for medical advice about side effects.  You may report side effects to FDA at 3-253-FDA-8782  © 2017 [facility] Information is for End User's use only and may not be sold, redistributed or otherwise used for commercial purposes. The above information is an  only. It is not intended as medical advice for individual conditions or treatments. Talk to your doctor, nurse or pharmacist before following any medical regimen to see if it is safe and effective for you. Clonazepam (By mouth)   Clonazepam (wksi-KGZ-u-claudine)  Treats seizures and panic disorder. Brand Name(s): KlonoPIN   There may be other brand names for this medicine. When This Medicine Should Not Be Used: This medicine is not right for everyone. Do not use it if you had an allergic reaction to clonazepam or similar medicines, or if you are pregnant or breastfeeding. How to Use This Medicine:   Tablet, Dissolving Tablet  · Take your medicine as directed. Your dose may need to be changed several times to find what works best for you. · Disintegrating tablet: Dry your hands before you handle the tablet. Place the tablet on your tongue and let it dissolve. · Tablet: Swallow whole with water. · This medicine should come with a Medication Guide. Ask your pharmacist for a copy if you do not have one. · Missed dose: Take a dose as soon as you remember. If it is almost time for your next dose, wait until then and take a regular dose. Do not take extra medicine to make up for a missed dose. · Store the medicine in a closed container at room temperature, away from heat, moisture, and direct light. Drugs and Foods to Avoid:   Ask your doctor or pharmacist before using any other medicine, including over-the-counter medicines, vitamins, and herbal products. · Some medicines can affect how clonazepam works.  Tell your doctor if you are using any of the following:   ¨ Carbamazepine, phenobarbital, phenytoin  ¨ Medicine to treat depression or mental health problems, including MAO inhibitors  ¨ Medicine to treat fungal infections  ¨ Phenothiazine medicine  · Do not drink alcohol while you are using this medicine. · Tell your doctor if you use anything else that makes you sleepy. Some examples are allergy medicine, narcotic pain medicine, and alcohol. Warnings While Using This Medicine:   · It is not safe to take this medicine during pregnancy. It could harm an unborn baby. Tell your doctor right away if you become pregnant. · Tell your doctor if you have kidney disease, liver disease, glaucoma, lung disease or breathing problems, porphyria, or a history of drug or alcohol abuse, depression, or mental health problems. · This medicine can increase thoughts of suicide. Tell your doctor right away if you start to feel depressed and have thoughts about hurting yourself. · This medicine can be habit-forming. Do not use more than your prescribed dose. Call your doctor if you think your medicine is not working. · Do not stop using this medicine suddenly. Your doctor will need to slowly decrease your dose before you stop it completely. · This medicine may make you dizzy or drowsy. Do not drive or do anything else that could be dangerous until you know how this medicine affects you. · Your doctor will do lab tests at regular visits to check on the effects of this medicine. Keep all appointments. · Keep all medicine out of the reach of children. Never share your medicine with anyone.   Possible Side Effects While Using This Medicine:   Call your doctor right away if you notice any of these side effects:  · Allergic reaction: Itching or hives, swelling in your face or hands, swelling or tingling in your mouth or throat, chest tightness, trouble breathing  · Confusion, memory problems  · Depression, unusual moods or behavior, thoughts of hurting yourself  · Extreme drowsiness, tiredness, or weakness, slow heartbeat, problems with coordination or walking  · Worsening seizures  If you notice these less serious side effects, talk with your doctor:   · Cough, runny or stuffy nose, sore throat  · Increased saliva  If you notice other side effects that you think are caused by this medicine, tell your doctor. Call your doctor for medical advice about side effects. You may report side effects to FDA at 9-297-FDA-5305  © 2017 [address]  Information is for End User's use only and may not be sold, redistributed or otherwise used for commercial purposes. The above information is an  only. It is not intended as medical advice for individual conditions or treatments. Talk to your doctor, nurse or pharmacist before following any medical regimen to see if it is safe and effective for you.

## 2018-02-20 NOTE — Clinical Note
Thank you for allowing me to contribute to the care of this patient. Please see attached note. Recommend SLEEP REFERRAL R/O XIMENA (defer to PCP), basic labs (ordered) as biological work up for symptoms. Discussed with patient. Will follow closely.

## 2018-02-23 NOTE — TELEPHONE ENCOUNTER
From: Loree Aguirre  To: Joseph Mayen MD  Sent: 2/23/2018 3:26 PM EST  Subject: Medication Renewal Request    Original authorizing provider: MD Loree Raymond would like a refill of the following medications:  atenolol (TENORMIN) 100 mg tablet Joseph Mayen MD]    Preferred pharmacy: Other - [address] [place] [address] [id number], [address] [facility] [address], [facility]    Comment:  CHANGE PHARMACY: Please send all current and new prescriptions to: [facility] #[id number], [address] [facility], [address] [facility], [address] [facility], Telephone number: [phone] Thank you.

## 2018-02-24 DIAGNOSIS — E11.9 CONTROLLED TYPE 2 DIABETES MELLITUS WITHOUT COMPLICATION, WITHOUT LONG-TERM CURRENT USE OF INSULIN (HCC): ICD-10-CM

## 2018-02-26 RX ORDER — METFORMIN HYDROCHLORIDE 500 MG/1
TABLET ORAL
Qty: 360 TAB | Refills: 0 | Status: SHIPPED | COMMUNITY
Start: 2018-02-26 | End: 2018-05-26 | Stop reason: SDUPTHER

## 2018-03-01 RX ORDER — ATENOLOL 100 MG/1
100 TABLET ORAL DAILY
Qty: 90 TAB | Refills: 0 | Status: SHIPPED | OUTPATIENT
Start: 2018-03-01 | End: 2018-06-02 | Stop reason: SDUPTHER

## 2018-03-20 ENCOUNTER — OFFICE VISIT (OUTPATIENT)
Dept: BEHAVIORAL/MENTAL HEALTH CLINIC | Age: 73
End: 2018-03-20

## 2018-03-20 VITALS
HEIGHT: 69 IN | WEIGHT: 200 LBS | TEMPERATURE: 98.1 F | OXYGEN SATURATION: 98 % | SYSTOLIC BLOOD PRESSURE: 170 MMHG | RESPIRATION RATE: 18 BRPM | DIASTOLIC BLOOD PRESSURE: 88 MMHG | BODY MASS INDEX: 29.62 KG/M2 | HEART RATE: 78 BPM

## 2018-03-20 DIAGNOSIS — R00.2 PALPITATIONS: ICD-10-CM

## 2018-03-20 DIAGNOSIS — F41.1 GAD (GENERALIZED ANXIETY DISORDER): Primary | ICD-10-CM

## 2018-03-20 DIAGNOSIS — F33.1 MDD (MAJOR DEPRESSIVE DISORDER), RECURRENT EPISODE, MODERATE (HCC): ICD-10-CM

## 2018-03-20 PROBLEM — F33.2 MDD (MAJOR DEPRESSIVE DISORDER), RECURRENT SEVERE, WITHOUT PSYCHOSIS (HCC): Status: RESOLVED | Noted: 2018-02-20 | Resolved: 2018-03-20

## 2018-03-20 RX ORDER — MIRTAZAPINE 30 MG/1
30 TABLET, FILM COATED ORAL
Qty: 30 TAB | Refills: 1 | Status: SHIPPED | OUTPATIENT
Start: 2018-03-20 | End: 2018-03-20 | Stop reason: SDUPTHER

## 2018-03-20 RX ORDER — VENLAFAXINE HYDROCHLORIDE 75 MG/1
75 CAPSULE, EXTENDED RELEASE ORAL DAILY
Qty: 14 CAP | Refills: 0 | Status: SHIPPED | OUTPATIENT
Start: 2018-03-20 | End: 2018-04-03

## 2018-03-20 RX ORDER — CLONAZEPAM 1 MG/1
1 TABLET ORAL 2 TIMES DAILY
Qty: 60 TAB | Refills: 0 | Status: SHIPPED | OUTPATIENT
Start: 2018-03-20 | End: 2018-04-19

## 2018-03-20 RX ORDER — VENLAFAXINE HYDROCHLORIDE 37.5 MG/1
37.5 CAPSULE, EXTENDED RELEASE ORAL DAILY
Qty: 21 CAP | Refills: 0 | Status: SHIPPED | OUTPATIENT
Start: 2018-04-03 | End: 2018-04-19

## 2018-03-20 NOTE — PROGRESS NOTES
Adult Psychiatry Progress Note    Assessment, and Plan:      ASSESSMENT:   Mr. Loree Aguirre is a 73 y/o male sx consistent with MDD recurrent moderate and TACHO. Low risk for acute SI, low-moderate risk for chronic suicide. Improved depressive and anxiety sx since last visit with recent increase in anxiety sx. Feels clonazepam is not helpful, xanax was more helpful. Will increase remeron to 30 mg nightly, increase clonazepam to 1 mg BID and taper off effexor XR 75 mg for 2 weeks and then 3 weeks 37.5 mg daily. Discussed r/b/se of medications. Encouraged to discuss palpitations and HTN  with PCP will order Holter Monitor to evaluate, r/o metabolic causes of increased anxiety. Recommended discussing sleep study referral with PCP to r/o XIMENA given high risk based on HPI assessment at initial visit. Referral to talk therapy placed and patient given the information, encouraged to schedule and increase social supports, healthy diet and exercise. Will follow closely.         PLAN:   Problem Diagnosis: MDD recurrent moderate TACHO  Prognosis: fair  Goals: decrease sx to improve social functioning  Biologic: SLEEP STUDY REFERRAL (DEFER TO PCP), holter monitor (ordered)  Medication: remeron titrate to 30 mg nightly, clonazepam 1 mg BID, taper off effexor, dose schedule given  Psychosocial: increase social supports, support group  Therapy: referral info given to patient  Behavioral: diet, exercise, meditation  Follow up: 4 weeks      Assessment and plan outlined above discussed with patient who voiced understanding and agreement. Author: Annabelle Ryder MD as of: 3/20/2018 at 1:12 PM.    Note follows below:  Patient Identifying Information     Loree Aguirre is a 72 y.o. male presenting with MDD, TACHO. Patient came to psychiatry on a voluntarily basis. Chief Complaint:  Depression and Anxiety     Stated Chief Complaint: follow up    History of Presenting Illness:      Last seen 2/20/2018 for establish care visit.  Since then reports feeling \"depression is much better\" today. The remeron works very well, helping with sleep. No SE. Anxiety is worse since last visit. Taking clonazepam twice a day. Feels more \"wound up\" recently. Anxiety started to get worse 3-4 days ago, unclear trigger. Clonazepam not helping. No side effects. \"feels like head is filled with cotton and about to explode\" starting last week. Eating 2-3 meals per day. Drinking \"lots of water\" per day, 4-5 16 ounce cups. Exercise not started yet, climbing stairs every day 3 flights 10 times a day at a minimum. No support group yet. No using son as support. Using friend more often in [place] over the phone. Still taking effexor XR, not helping much currently, no claustrophobia. For past two weeks reports over half days trouble sleeping, poor appetite, fidgeting, uncontrolled anxiety, increased worry, uncontrolled worry and feeling afraid. No harjeet, psychosis, PTSD, OCD. Therapy: not yet    Violence History/Risk:      History of violence: No  Current access to firearm: No  Recent or current violent ideation: No     Suicidal Ideation and Behavior History/Risk:      Current suicidal thoughts: No  Previous suicide attempts: No  Previous self-injurious behavior/risky behavior: No  Previous suicidal ideation: passive SI prior to last visit  Access to stockpile pills: No  Impulsivity: No     Substance Abuse History   Recreational Drugs: No  Use of Alcohol: used to drink a lot of beer self medicating, stopped 2010  Tobacco Use: 1/2 ppd  Abuse of medications: No h/o  Legal Consequences of chemical use: No    Past Psychiatric and Medical History, Social History, Past Surgical History and Family History: reviewed and updated in EMR as appropriate. Medications: Reviewed and updated in EMR as appropriate. Allergies: Reviewed and updated in EMR as appropriate. Current Outpatient Prescriptions   Medication Sig    atenolol (TENORMIN) 100 mg tablet Take 1 Tab by mouth daily.     metFORMIN (GLUCOPHAGE) 500 mg tablet TAKE 2 TABLETS BY MOUTH TWICE DAILY WITH MEALS    mirtazapine (REMERON) 15 mg tablet Take 1/2 tab PO q HS for 5 days then increase to 1 tab PO q HS    clonazePAM (KLONOPIN) 0.5 mg tablet Take 1 Tab by mouth two (2) times a day for 30 days. Max Daily Amount: 1 mg.  glipiZIDE SR (GLUCOTROL XL) 10 mg CR tablet TAKE 1 TABLET BY MOUTH EVERY DAY    benazepril (LOTENSIN) 40 mg tablet TK 1 T PO QD.    fenofibrate (LOFIBRA) 160 mg tablet TAKE 1 TABLET BY MOUTH EVERY DAY WITH A MEAL    simvastatin (ZOCOR) 40 mg tablet Take 1 Tab by mouth nightly.  metFORMIN (GLUCOPHAGE) 500 mg tablet TAKE 2 TABLETS BY MOUTH TWICE DAILY WITH MEAL    venlafaxine-SR (EFFEXOR-XR) 150 mg capsule Take 1 Cap by mouth daily.  aspirin delayed-release 81 mg tablet Take  by mouth daily. No current facility-administered medications for this visit.           Objective/Exam:       Visit Vitals    BP (!) 176/93    Pulse 83    Temp 98.1 °F (36.7 °C) (Oral)    Resp 18    Ht 5' 8.5\" (1.74 m)    Wt 200 lb (90.7 kg)    SpO2 98%    BMI 29.97 kg/m2       General: sitting comfortably in no acute distress    Mental Status Evaluation:      Appearance Elderly male dressed casually, good GH, good EC   Attitude Cooperative,    Behavior:  No PMA/R   Speech:  Normal, spontaenous   Mood:  \"anxious\" appears euthymic with underlying anxiety   Affect:  Congruent and reactive   Thought Process:  Linear, goal directed   Thought Content:  As per \[facility] [place] [facility]\""   Fund of Knowledge Good   SI/HI/Psychosis Denies   Sensorium:  Alert   Cognition:  Grossly intact, not formally assessed   Insight:  fair   Judgment: fair                                       Impulse Control:  good      PHQ-9: 9, not difficult at all  TACHO-7: 8, somewhat difficult    2/20/2018 PHQ-9: 25, very difficult to extremely difficult  2/20/2018 TACHO-7: 20, very difficult to extremely difficult        Pertinent Labs and Studies   Results for Shelia Johnston (MRN [id number]) as of 3/20/2018 13:24   Ref. Range 2/20/2018 11:42   WBC Latest Ref Range: 4.6 - 13.2 K/uL 7.8   RBC Latest Ref Range: 4.70 - 5.50 M/uL 4.65 (L)   HGB Latest Ref Range: 13.0 - 16.0 g/dL 14.1   HCT Latest Ref Range: 36.0 - 48.0 % 43.1   MCV Latest Ref Range: 74.0 - 97.0 FL 92.7   MCH Latest Ref Range: 24.0 - 34.0 PG 30.3   MCHC Latest Ref Range: 31.0 - 37.0 g/dL 32.7   RDW Latest Ref Range: 11.6 - 14.5 % 13.1   PLATELET Latest Ref Range: 135 - 420 K/uL 152   MPV Latest Ref Range: 9.2 - 11.8 FL 11.7   NEUTROPHILS Latest Ref Range: 40 - 73 % 52   LYMPHOCYTES Latest Ref Range: 21 - 52 % 38   MONOCYTES Latest Ref Range: 3 - 10 % 7   EOSINOPHILS Latest Ref Range: 0 - 5 % 2   BASOPHILS Latest Ref Range: 0 - 2 % 1   DF Latest Units:   AUTOMATED   ABS. NEUTROPHILS Latest Ref Range: 1.8 - 8.0 K/UL 4.1   ABS. LYMPHOCYTES Latest Ref Range: 0.9 - 3.6 K/UL 3.0   ABS. MONOCYTES Latest Ref Range: 0.05 - 1.2 K/UL 0.5   ABS. EOSINOPHILS Latest Ref Range: 0.0 - 0.4 K/UL 0.2   ABS.  BASOPHILS Latest Ref Range: 0.0 - 0.06 K/UL 0.0   Sodium Latest Ref Range: 136 - 145 mmol/L 139   Potassium Latest Ref Range: 3.5 - 5.5 mmol/L 4.1   Chloride Latest Ref Range: 100 - 108 mmol/L 102   CO2 Latest Ref Range: 21 - 32 mmol/L 28   Anion gap Latest Ref Range: 3.0 - 18 mmol/L 9   Glucose Latest Ref Range: 74 - 99 mg/dL 178 (H)   BUN Latest Ref Range: 7.0 - 18 MG/DL 5 (L)   Creatinine Latest Ref Range: 0.6 - 1.3 MG/DL 1.03   BUN/Creatinine ratio Latest Ref Range: 12 - 20   5 (L)   Calcium Latest Ref Range: 8.5 - 10.1 MG/DL 8.9   GFR est non-AA Latest Ref Range: >60 ml/min/1.73m2 >60   GFR est AA Latest Ref Range: >60 ml/min/1.73m2 >60   Bilirubin, total Latest Ref Range: 0.2 - 1.0 MG/DL 0.5   Protein, total Latest Ref Range: 6.4 - 8.2 g/dL 6.9   Albumin Latest Ref Range: 3.4 - 5.0 g/dL 4.3   Globulin Latest Ref Range: 2.0 - 4.0 g/dL 2.6   A-G Ratio Latest Ref Range: 0.8 - 1.7   1.7   ALT (SGPT) Latest Ref Range: 16 - 61 U/L 48   AST Latest Ref Range: 15 - 37 U/L 36   Alk. phosphatase Latest Ref Range: 45 - 117 U/L 41 (L)   CK Latest Ref Range: 39 - 308 U/L 66   TSH Latest Ref Range: 0.36 - 3.74 uIU/mL 1.81       Acute risk for:    Danger to self:  LOW  Danger to others: LOW  Grave disability: LOW      Diagnosis:       Axis I: MDD recurrent moderate TACHO    Medication Reconciliation:     Medication reconciliation completed? YES  Includes discharge medication reconciliation? YES    More than 50% of this 45 MIN visit was spent face to face counseling the patient about the etiology and treatment options for the above health conditions outlined in assessment and plan      Conor Moreno M.D.   [address], [address] [place] [address], [address] [place] [address] [phone]  [facility] [phone]

## 2018-03-20 NOTE — MR AVS SNAPSHOT
Dean Peng 
 
 
 [address] [address] [address] [address] 
[phone] Patient: Christina Dallas MRN: [id number] [id number] Visit Information Date & Time Provider Department Dept. Phone Encounter #  
 3/20/2018  1:00 PM Ebenezer Wilcox MD [address] [place], [place] [id number] Follow-up Instructions Return in about 4 weeks (around 4/17/2018) for ANXIETY AND DEPRESSION. Your Appointments 4/11/2018  1:00 PM  
ROUTINE CARE with Bianca Wade MD  
[address]) Appt Note: 3 month f/u  
 [id number] [facility] [address] [place] [address]  
[phone]  
  
   
 [address] 4/11/2018  1:45 PM  
ROUTINE CARE with Bianca Wade MD  
[address]) Appt Note: Discuss HM  
 [id number] [facility] [address] [place] [address]  
[phone] Upcoming Health Maintenance Date Due  
 FOOT EXAM Q1 1/3/1955 EYE EXAM RETINAL OR DILATED Q1 1/3/1957 DTaP/Tdap/Td series (1 - Tdap) 1/3/1968 FOBT Q 1 YEAR AGE 50-75 1/3/1997 ZOSTER VACCINE AGE 60> 11/3/2006 GLAUCOMA SCREENING Q2Y 1/3/2012 Pneumococcal 65+ Low/Medium Risk (1 of 2 - PCV13) 1/3/2012 HEMOGLOBIN A1C Q6M 7/11/2018 MICROALBUMIN Q1 10/11/2018 LIPID PANEL Q1 10/11/2018 MEDICARE YEARLY EXAM 10/12/2018 Allergies as of 3/20/2018  Review Complete On: 3/20/2018 By: Ebenezer Wilcox MD  
 No Known Allergies Current Immunizations  Never Reviewed Name Date Influenza High Dose Vaccine PF 10/11/2017 Not reviewed this visit You Were Diagnosed With   
  
 Codes Comments Palpitations    -  Primary ICD-10-CM: R00.2 ICD-9-CM: 785.1 TACHO (generalized anxiety disorder)     ICD-10-CM: F41.1 ICD-9-CM: 300.02   
 MDD (major depressive disorder), recurrent episode, moderate (HCC)     ICD-10-CM: F33.1 ICD-9-CM: 296.32 Vitals BP Pulse Temp Resp Height(growth percentile) Weight(growth percentile) 170/88 78 98.1 °F (36.7 °C) (Oral) 18 5' 8.5\" (1.74 m) 200 lb (90.7 kg) SpO2 BMI Smoking Status 98% 29.97 kg/m2 Current Every Day Smoker Vitals History BMI and BSA Data Body Mass Index Body Surface Area  
 29.97 kg/m 2 2.09 m 2 Preferred Pharmacy Pharmacy Name Phone [facility] [id number] [facility] [place], [address] AT [address] [phone] Your Updated Medication List  
  
   
This list is accurate as of 3/20/18  2:04 PM.  Always use your most recent med list.  
  
  
  
  
 aspirin delayed-release 81 mg tablet Take  by mouth daily. atenolol 100 mg tablet Commonly known as:  TENORMIN Take 1 Tab by mouth daily. benazepril 40 mg tablet Commonly known as:  LOTENSIN  
TK 1 T PO QD.  
  
 clonazePAM 1 mg tablet Commonly known as:  [place] Mendez Take 1 Tab by mouth two (2) times a day. Max Daily Amount: 2 mg. fenofibrate 160 mg tablet Commonly known as:  LOFIBRA TAKE 1 TABLET BY MOUTH EVERY DAY WITH A MEAL  
  
 glipiZIDE SR 10 mg CR tablet Commonly known as:  GLUCOTROL XL  
TAKE 1 TABLET BY MOUTH EVERY DAY  
  
 * metFORMIN 500 mg tablet Commonly known as:  GLUCOPHAGE  
TAKE 2 TABLETS BY MOUTH TWICE DAILY WITH MEAL  
  
 * metFORMIN 500 mg tablet Commonly known as:  GLUCOPHAGE  
TAKE 2 TABLETS BY MOUTH TWICE DAILY WITH MEALS  
  
 mirtazapine 30 mg tablet Commonly known as:  Magaly Contes Take 1 Tab by mouth nightly for 60 days. simvastatin 40 mg tablet Commonly known as:  ZOCOR Take 1 Tab by mouth nightly. * venlafaxine-SR 75 mg capsule Commonly known as:  EFFEXOR-XR Take 1 Cap by mouth daily for 14 days. * venlafaxine-SR 37.5 mg capsule Commonly known as:  EFFEXOR-XR  
 Take 1 Cap by mouth daily for 21 days. Start taking on:  4/3/2018 * Notice: This list has 4 medication(s) that are the same as other medications prescribed for you. Read the directions carefully, and ask your doctor or other care provider to review them with you. Prescriptions Printed Refills  
 clonazePAM (KLONOPIN) 1 mg tablet 0 Sig: Take 1 Tab by mouth two (2) times a day. Max Daily Amount: 2 mg. Class: Print Route: Oral  
  
Prescriptions Sent to Pharmacy Refills  
 mirtazapine (REMERON) 30 mg tablet 1 Sig: Take 1 Tab by mouth nightly for 60 days. Class: Normal  
 Pharmacy: [facility] [place] [address] AT [address] [place] [address] Rd [address] Ph #: [phone] Route: Oral  
 venlafaxine-SR (EFFEXOR-XR) 75 mg capsule 0 Sig: Take 1 Cap by mouth daily for 14 days. Class: Normal  
 Pharmacy: [facility] [place] [address] AT [address] [place] [address] Rd [address] Ph #: [phone] Route: Oral  
 venlafaxine-SR (EFFEXOR-XR) 37.5 mg capsule 0 Starting on: 4/3/2018 Sig: Take 1 Cap by mouth daily for 21 days. Class: Normal  
 Pharmacy: [facility] [place] [address] AT [address] [place] [address] [address] Ph #: [phone] Route: Oral  
  
Follow-up Instructions Return in about 4 weeks (around 4/17/2018) for ANXIETY AND DEPRESSION. To-Do List   
 03/20/2018 ECG:  ECG HOLTER MONITOR, UP TO 48 HRS Patient Instructions CALL BELOW TO SCHEDULE TALK THERAPY/COUNSELING:  
Natalee Arias 47.. 
Dr. Kevin Carrasquillo, [address] [facility] [address] [address], [facility] [address] [place] [address], [address] [place] [address],  [address] Phone: [phone] FOR PALPITATIONS:  
For your irregular heart beats we are sending a referral to [address] [facility] at [address] should be called about this in 1-2 weeks. If no word in 2 weeks please give us a call to follow up. If you have rapid heart beat that last greater than 10 minutes with chest pain or shortness of breath please report to hospital for evaluation. FOR ANXIETY AND DEPRESSION:  
Start taking clonazepam 1 mg twice a day, increased from 0.5 mg twice a day Start taking Remeron/Mirtazapine 30 mg nightly Start taking effexor XR 75 mg daily for 2 weeks then 37.5 mg daily for 3 weeks IF YOU DEVELOP FEVERS, CHILLS, MUSCLE TREMORS, MUSCLE SPASMS, TREMULOUSNESS PLEASE STOP REMERON/EFFEXOR AND GO TO HOSPITAL, THIS IS CALL SEROTONIN SYNDROME 
 
LIFESTYLE CHANGES:  
START EATING THREE MEALS A DAY STARTING WITH BREAKFAST 
START DRINKING 2 LITERS OF WATER PER DAY (65 OUNCES) EXERCISE 30 MINUTES A DAY, STAIRS ARE OKAY WORK ON MEDITATION OUTLINED BELOW INCREASE SOCIAL SUPPORTS, CONSIDER JOINING SUPPORT GROUP 
TALK MORE TO YOUR SON ABOUT YOUR MOOD/STRESS/ANXIETY SYMPTOMS AND LEVEL OF DISTRESS 
FOR SLEEP TALK TO PCP DR. CARR ABOUT GETTING A SLEEP STUDY If you are having thoughts of harming yourself or others please report to local hospital for evaluation or call suicide hotline [phone] HOW TO MEDITATE: SIMPLE MEDITATION FOR BEGINNERS This meditation exercise is an excellent introduction to meditation techniques. Sit comfortably. You may even want to invest in a meditation chair. Close your eyes. Make no effort to control the breath; simply breathe naturally. Focus your attention on the breath  (think \"im breathing in\" when breathing in, think \"im breathing out\" when breathing out) and on how the body moves with each inhalation and exhalation. Notice the movement of your body as you breathe. Observe your chest, shoulders, rib cage, and belly. Simply focus your attention on your breath without controlling its pace or intensity. If your mind wanders, return your focus back to your breath. Focus on breathing in as you take in a breath and focus on breathing out as you exhale Maintain this meditation practice for two to three minutes to start, and then try it for longer periods gradually extended to 5 to 10 minutes daily. Anxiety Disorder: Care Instructions Your Care Instructions Anxiety is a normal reaction to stress. Difficult situations can cause you to have symptoms such as sweaty palms and a nervous feeling. In an anxiety disorder, the symptoms are far more severe. Constant worry, muscle tension, trouble sleeping, nausea and diarrhea, and other symptoms can make normal daily activities difficult or impossible. These symptoms may occur for no reason, and they can affect your work, school, or social life. Medicines, counseling, and self-care can all help. Follow-up care is a key part of your treatment and safety. Be sure to make and go to all appointments, and call your doctor if you are having problems. It's also a good idea to know your test results and keep a list of the medicines you take. How can you care for yourself at home? · Take medicines exactly as directed. Call your doctor if you think you are having a problem with your medicine. · Go to your counseling sessions and follow-up appointments. · Recognize and accept your anxiety. Then, when you are in a situation that makes you anxious, say to yourself, \"This is not an emergency. I feel uncomfortable, but I am not in danger. I can keep going even if I feel anxious. \" · Be kind to your body: ¨ Relieve tension with exercise or a massage. ¨ Get enough rest. 
¨ Avoid alcohol, caffeine, nicotine, and illegal drugs. They can increase your anxiety level and cause sleep problems. ¨ Learn and do relaxation techniques. See below for more about these techniques. · Engage your mind. Get out and do something you enjoy. Go to a funny movie, or take a walk or hike. Plan your day.  Having too much or too little to do can make you anxious. · Keep a record of your symptoms. Discuss your fears with a good friend or family member, or join a support group for people with similar problems. Talking to others sometimes relieves stress. · Get involved in social groups, or volunteer to help others. Being alone sometimes makes things seem worse than they are. · Get at least 30 minutes of exercise on most days of the week to relieve stress. Walking is a good choice. You also may want to do other activities, such as running, swimming, cycling, or playing tennis or team sports. Relaxation techniques Do relaxation exercises 10 to 20 minutes a day. You can play soothing, relaxing music while you do them, if you wish. · Tell others in your house that you are going to do your relaxation exercises. Ask them not to disturb you. · Find a comfortable place, away from all distractions and noise. · Lie down on your back, or sit with your back straight. · Focus on your breathing. Make it slow and steady. · Breathe in through your nose. Breathe out through either your nose or mouth. · Breathe deeply, filling up the area between your navel and your rib cage. Breathe so that your belly goes up and down. · Do not hold your breath. · Breathe like this for 5 to 10 minutes. Notice the feeling of calmness throughout your whole body. As you continue to breathe slowly and deeply, relax by doing the following for another 5 to 10 minutes: · Tighten and relax each muscle group in your body. You can begin at your toes and work your way up to your head. · Imagine your muscle groups relaxing and becoming heavy. · Empty your mind of all thoughts. · Let yourself relax more and more deeply. · Become aware of the state of calmness that surrounds you.  
· When your relaxation time is over, you can bring yourself back to alertness by moving your fingers and toes and then your hands and feet and then stretching and moving your entire body. Sometimes people fall asleep during relaxation, but they usually wake up shortly afterward. · Always give yourself time to return to full alertness before you drive a car or do anything that might cause an accident if you are not fully alert. Never play a relaxation tape while you drive a car. When should you call for help? Call 911 anytime you think you may need emergency care. For example, call if: 
? · You feel you cannot stop from hurting yourself or someone else. ? Keep the numbers for these national suicide hotlines: 0-628-170-TALK (2-124.144.9290) and 5-528-TNXNHXR (0-845.451.7649). If you or someone you know talks about suicide or feeling hopeless, get help right away. ? Watch closely for changes in your health, and be sure to contact your doctor if: 
? · You have anxiety or fear that affects your life. ? · You have symptoms of anxiety that are new or different from those you had before. Where can you learn more? Go to http://dariuszMeet.comstanley.info/. Enter P754 in the search box to learn more about \"Anxiety Disorder: Care Instructions. \" Current as of: May 12, 2017 Content Version: 11.4 © 9524-3323 Healthwise, Incorporated. Care instructions adapted under license by [employer] (which disclaims liability or warranty for this information). If you have questions about a medical condition or this instruction, always ask your healthcare professional. [place] [address] any warranty or liability for your use of this information. Recovering From Depression: Care Instructions Your Care Instructions Taking good care of yourself is important as you recover from depression. In time, your symptoms will fade as your treatment takes hold. Do not give up. Instead, focus your energy on getting better. Your mood will improve. It just takes some time.  Focus on things that can help you feel better, such as being with friends and family, eating well, and getting enough rest. But take things slowly. Do not do too much too soon. You will begin to feel better gradually. Follow-up care is a key part of your treatment and safety. Be sure to make and go to all appointments, and call your doctor if you are having problems. It's also a good idea to know your test results and keep a list of the medicines you take. How can you care for yourself at home? Be realistic · If you have a large task to do, break it up into smaller steps you can handle, and just do what you can. · You may want to put off important decisions until your depression has lifted. If you have plans that will have a major impact on your life, such as marriage, divorce, or a job change, try to wait a bit. Talk it over with friends and loved ones who can help you look at the overall picture first. 
· Reaching out to people for help is important. Do not isolate yourself. Let your family and friends help you. Find someone you can trust and confide in, and talk to that person. · Be patient, and be kind to yourself. Remember that depression is not your fault and is not something you can overcome with willpower alone. Treatment is necessary for depression, just like for any other illness. Feeling better takes time, and your mood will improve little by little. Stay active · Stay busy and get outside. Take a walk, or try some other light exercise. · Talk with your doctor about an exercise program. Exercise can help with mild depression. · Go to a movie or concert. Take part in a Druze activity or other social gathering. Go to a ball game. · Ask a friend to have dinner with you. Take care of yourself · Eat a balanced diet with plenty of fresh fruits and vegetables, whole grains, and lean protein. If you have lost your appetite, eat small snacks rather than large meals. · Avoid drinking alcohol or using illegal drugs. Do not take medicines that have not been prescribed for you. They may interfere with medicines you may be taking for depression, or they may make your depression worse. · Take your medicines exactly as they are prescribed. You may start to feel better within 1 to 3 weeks of taking antidepressant medicine. But it can take as many as 6 to 8 weeks to see more improvement. If you have questions or concerns about your medicines, or if you do not notice any improvement by 3 weeks, talk to your doctor. · If you have any side effects from your medicine, tell your doctor. Antidepressants can make you feel tired, dizzy, or nervous. Some people have dry mouth, constipation, headaches, sexual problems, or diarrhea. Many of these side effects are mild and will go away on their own after you have been taking the medicine for a few weeks. Some may last longer. Talk to your doctor if side effects are bothering you too much. You might be able to try a different medicine. · Get enough sleep. If you have problems sleeping: ¨ Go to bed at the same time every night, and get up at the same time every morning. ¨ Keep your bedroom dark and quiet. ¨ Do not exercise after 5:00 p.m. ¨ Avoid drinks with caffeine after 5:00 p.m. · Avoid sleeping pills unless they are prescribed by the doctor treating your depression. Sleeping pills may make you groggy during the day, and they may interact with other medicine you are taking. · If you have any other illnesses, such as diabetes, heart disease, or high blood pressure, make sure to continue with your treatment. Tell your doctor about all of the medicines you take, including those with or without a prescription. · Keep the numbers for these national suicide hotlines: 9-764-032-TALK (8-638.868.4934) and 5-050-WTJLBSF (7-468.545.2016). If you or someone you know talks about suicide or feeling hopeless, get help right away. When should you call for help? Call 911 anytime you think you may need emergency care. For example, call if: 
? · You feel like hurting yourself or someone else. ? · Someone you know has depression and is about to attempt or is attempting suicide. ?Call your doctor now or seek immediate medical care if: 
? · You hear voices. ? · Someone you know has depression and: 
¨ Starts to give away his or her possessions. ¨ Uses illegal drugs or drinks alcohol heavily. ¨ Talks or writes about death, including writing suicide notes or talking about guns, knives, or pills. ¨ Starts to spend a lot of time alone. ¨ Acts very aggressively or suddenly appears calm. ? Watch closely for changes in your health, and be sure to contact your doctor if: 
? · You do not get better as expected. Where can you learn more? Go to http://dariusz-stanley.info/. Enter G506 in the search box to learn more about \"Recovering From Depression: Care Instructions. \" Current as of: May 12, 2017 Content Version: 11.4 © 4943-8798 [employer]. Care instructions adapted under license by [employer] (which disclaims liability or warranty for this information). If you have questions about a medical condition or this instruction, always ask your healthcare professional. [address] any warranty or liability for your use of this information. Introducing [address]! Dear Darian Contreras: Thank you for requesting a Mones[employer] account. Our records indicate that you already have an active Mones[employer] account. You can access your account anytime at [email or web address]. [email or web address] Did you know that you can access your hospital and ER discharge instructions at any time in Monesbat? You can also review all of your test results from your hospital stay or ER visit. Additional Information If you have questions, please visit the Frequently Asked Questions section of the [employer] website at [email or web address] Remember, [employer] is NOT to be used for urgent needs. For medical emergencies, dial 911. Now available from your iPhone and Android! Please provide this summary of care documentation to your next provider. Your primary care clinician is listed as [id number] [facility]. If you have any questions after today's visit, please call [phone].

## 2018-03-20 NOTE — Clinical Note
Thank you for allowing me to contribute to the care of this patient. Please see attached note. Recommend SLEEP STUDY, HTN CONTROL (defer to PCP), HOLTER MONITOR(ordered) as biological work up for symptoms. Discussed with patient. Will follow closely.

## 2018-03-20 NOTE — PATIENT INSTRUCTIONS
CALL BELOW TO SCHEDULE TALK THERAPY/COUNSELING:   Soraida Matamoros.  Dr. Geovanni Packer, [address] [place] [address]. [facility]   [address] [facility],[address]  [place], [address] [place] [address],  [address]  Phone: [phone]    FOR PALPITATIONS:   For your irregular heart beats we are sending a referral to [address] [facility] at [address] should be called about this in 1-2 weeks. If no word in 2 weeks please give us a call to follow up. If you have rapid heart beat that last greater than 10 minutes with chest pain or shortness of breath please report to hospital for evaluation. FOR ANXIETY AND DEPRESSION:   Start taking clonazepam 1 mg twice a day, increased from 0.5 mg twice a day  Start taking Remeron/Mirtazapine 30 mg nightly  Start taking effexor XR 75 mg daily for 2 weeks then 37.5 mg daily for 3 weeks  IF YOU DEVELOP FEVERS, CHILLS, MUSCLE TREMORS, MUSCLE SPASMS, TREMULOUSNESS PLEASE STOP REMERON/EFFEXOR AND GO TO HOSPITAL, THIS IS CALL SEROTONIN SYNDROME    LIFESTYLE CHANGES:   START EATING THREE MEALS A DAY STARTING WITH BREAKFAST  START DRINKING 2 LITERS OF WATER PER DAY (65 OUNCES)  EXERCISE 30 MINUTES A DAY, STAIRS ARE OKAY  WORK ON MEDITATION OUTLINED BELOW  INCREASE SOCIAL SUPPORTS, CONSIDER JOINING SUPPORT GROUP  TALK MORE TO YOUR SON ABOUT YOUR MOOD/STRESS/ANXIETY SYMPTOMS AND LEVEL OF DISTRESS  FOR SLEEP TALK TO PCP DR. CARR ABOUT GETTING A SLEEP STUDY      If you are having thoughts of harming yourself or others please report to local hospital for evaluation or call suicide hotline 0       HOW TO MEDITATE: SIMPLE MEDITATION FOR BEGINNERS  This meditation exercise is an excellent introduction to meditation techniques. Sit comfortably. You may even want to invest in a meditation chair. Close your eyes. Make no effort to control the breath; simply breathe naturally.   Focus your attention on the breath  (think \"im breathing in\" when breathing in, think \"im breathing out\" when breathing out) and on how the body moves with each inhalation and exhalation. Notice the movement of your body as you breathe. Observe your chest, shoulders, rib cage, and belly. Simply focus your attention on your breath without controlling its pace or intensity. If your mind wanders, return your focus back to your breath. Focus on breathing in as you take in a breath and focus on breathing out as you exhale  Maintain this meditation practice for two to three minutes to start, and then try it for longer periods gradually extended to 5 to 10 minutes daily. Anxiety Disorder: Care Instructions  Your Care Instructions    Anxiety is a normal reaction to stress. Difficult situations can cause you to have symptoms such as sweaty palms and a nervous feeling. In an anxiety disorder, the symptoms are far more severe. Constant worry, muscle tension, trouble sleeping, nausea and diarrhea, and other symptoms can make normal daily activities difficult or impossible. These symptoms may occur for no reason, and they can affect your work, school, or social life. Medicines, counseling, and self-care can all help. Follow-up care is a key part of your treatment and safety. Be sure to make and go to all appointments, and call your doctor if you are having problems. It's also a good idea to know your test results and keep a list of the medicines you take. How can you care for yourself at home? · Take medicines exactly as directed. Call your doctor if you think you are having a problem with your medicine. · Go to your counseling sessions and follow-up appointments. · Recognize and accept your anxiety. Then, when you are in a situation that makes you anxious, say to yourself, \"This is not an emergency. I feel uncomfortable, but I am not in danger. I can keep going even if I feel anxious. \"  · Be kind to your body:  ¨ Relieve tension with exercise or a massage.   ¨ Get enough rest.  ¨ Avoid alcohol, caffeine, nicotine, and illegal drugs. They can increase your anxiety level and cause sleep problems. ¨ Learn and do relaxation techniques. See below for more about these techniques. · Engage your mind. Get out and do something you enjoy. Go to a funny movie, or take a walk or hike. Plan your day. Having too much or too little to do can make you anxious. · Keep a record of your symptoms. Discuss your fears with a good friend or family member, or join a support group for people with similar problems. Talking to others sometimes relieves stress. · Get involved in social groups, or volunteer to help others. Being alone sometimes makes things seem worse than they are. · Get at least 30 minutes of exercise on most days of the week to relieve stress. Walking is a good choice. You also may want to do other activities, such as running, swimming, cycling, or playing tennis or team sports. Relaxation techniques  Do relaxation exercises 10 to 20 minutes a day. You can play soothing, relaxing music while you do them, if you wish. · Tell others in your house that you are going to do your relaxation exercises. Ask them not to disturb you. · Find a comfortable place, away from all distractions and noise. · Lie down on your back, or sit with your back straight. · Focus on your breathing. Make it slow and steady. · Breathe in through your nose. Breathe out through either your nose or mouth. · Breathe deeply, filling up the area between your navel and your rib cage. Breathe so that your belly goes up and down. · Do not hold your breath. · Breathe like this for 5 to 10 minutes. Notice the feeling of calmness throughout your whole body. As you continue to breathe slowly and deeply, relax by doing the following for another 5 to 10 minutes:  · Tighten and relax each muscle group in your body. You can begin at your toes and work your way up to your head. · Imagine your muscle groups relaxing and becoming heavy.   · Empty your mind of all thoughts. · Let yourself relax more and more deeply. · Become aware of the state of calmness that surrounds you. · When your relaxation time is over, you can bring yourself back to alertness by moving your fingers and toes and then your hands and feet and then stretching and moving your entire body. Sometimes people fall asleep during relaxation, but they usually wake up shortly afterward. · Always give yourself time to return to full alertness before you drive a car or do anything that might cause an accident if you are not fully alert. Never play a relaxation tape while you drive a car. When should you call for help? Call 911 anytime you think you may need emergency care. For example, call if:  ? · You feel you cannot stop from hurting yourself or someone else. ? Keep the numbers for these national suicide hotlines: 1-693-701-TALK (7-647.525.7909) and 4-677-BHKIKOH (7-740.652.2595). If you or someone you know talks about suicide or feeling hopeless, get help right away. ? Watch closely for changes in your health, and be sure to contact your doctor if:  ? · You have anxiety or fear that affects your life. ? · You have symptoms of anxiety that are new or different from those you had before. Where can you learn more? Go to http://dariusz-stanley.info/. Enter P754 in the search box to learn more about \"Anxiety Disorder: Care Instructions. \"  Current as of: May 12, 2017  Content Version: 11.4  © 0202-0003 Healthwise, Incorporated. Care instructions adapted under license by [employer] (which disclaims liability or warranty for this information). If you have questions about a medical condition or this instruction, always ask your healthcare professional. [place] [address] any warranty or liability for your use of this information.          Recovering From Depression: Care Instructions  Your Care Instructions    Taking good care of yourself is important as you recover from depression. In time, your symptoms will fade as your treatment takes hold. Do not give up. Instead, focus your energy on getting better. Your mood will improve. It just takes some time. Focus on things that can help you feel better, such as being with friends and family, eating well, and getting enough rest. But take things slowly. Do not do too much too soon. You will begin to feel better gradually. Follow-up care is a key part of your treatment and safety. Be sure to make and go to all appointments, and call your doctor if you are having problems. It's also a good idea to know your test results and keep a list of the medicines you take. How can you care for yourself at home? Be realistic  · If you have a large task to do, break it up into smaller steps you can handle, and just do what you can. · You may want to put off important decisions until your depression has lifted. If you have plans that will have a major impact on your life, such as marriage, divorce, or a job change, try to wait a bit. Talk it over with friends and loved ones who can help you look at the overall picture first.  · Reaching out to people for help is important. Do not isolate yourself. Let your family and friends help you. Find someone you can trust and confide in, and talk to that person. · Be patient, and be kind to yourself. Remember that depression is not your fault and is not something you can overcome with willpower alone. Treatment is necessary for depression, just like for any other illness. Feeling better takes time, and your mood will improve little by little. Stay active  · Stay busy and get outside. Take a walk, or try some other light exercise. · Talk with your doctor about an exercise program. Exercise can help with mild depression. · Go to a movie or concert. Take part in a Holiness activity or other social gathering. Go to a ball game. · Ask a friend to have dinner with you.   Take care of yourself  · Eat a balanced diet with plenty of fresh fruits and vegetables, whole grains, and lean protein. If you have lost your appetite, eat small snacks rather than large meals. · Avoid drinking alcohol or using illegal drugs. Do not take medicines that have not been prescribed for you. They may interfere with medicines you may be taking for depression, or they may make your depression worse. · Take your medicines exactly as they are prescribed. You may start to feel better within 1 to 3 weeks of taking antidepressant medicine. But it can take as many as 6 to 8 weeks to see more improvement. If you have questions or concerns about your medicines, or if you do not notice any improvement by 3 weeks, talk to your doctor. · If you have any side effects from your medicine, tell your doctor. Antidepressants can make you feel tired, dizzy, or nervous. Some people have dry mouth, constipation, headaches, sexual problems, or diarrhea. Many of these side effects are mild and will go away on their own after you have been taking the medicine for a few weeks. Some may last longer. Talk to your doctor if side effects are bothering you too much. You might be able to try a different medicine. · Get enough sleep. If you have problems sleeping:  ¨ Go to bed at the same time every night, and get up at the same time every morning. ¨ Keep your bedroom dark and quiet. ¨ Do not exercise after 5:00 p.m. ¨ Avoid drinks with caffeine after 5:00 p.m. · Avoid sleeping pills unless they are prescribed by the doctor treating your depression. Sleeping pills may make you groggy during the day, and they may interact with other medicine you are taking. · If you have any other illnesses, such as diabetes, heart disease, or high blood pressure, make sure to continue with your treatment. Tell your doctor about all of the medicines you take, including those with or without a prescription.   · Keep the numbers for these national suicide hotlines: 8-747-838-TALK (8-965-215-912.900.2222) and 5-827-RNNRYCC (4-416.707.5942). If you or someone you know talks about suicide or feeling hopeless, get help right away. When should you call for help? Call 911 anytime you think you may need emergency care. For example, call if:  ? · You feel like hurting yourself or someone else. ? · Someone you know has depression and is about to attempt or is attempting suicide. ?Call your doctor now or seek immediate medical care if:  ? · You hear voices. ? · Someone you know has depression and:  ¨ Starts to give away his or her possessions. ¨ Uses illegal drugs or drinks alcohol heavily. ¨ Talks or writes about death, including writing suicide notes or talking about guns, knives, or pills. ¨ Starts to spend a lot of time alone. ¨ Acts very aggressively or suddenly appears calm. ? Watch closely for changes in your health, and be sure to contact your doctor if:  ? · You do not get better as expected. Where can you learn more? Go to http://dariusz-stanley.info/. Enter Y239 in the search box to learn more about \"Recovering From Depression: Care Instructions. \"  Current as of: May 12, 2017  Content Version: 11.4  © 5525-4349 Healthwise, Incorporated. Care instructions adapted under license by [employer] (which disclaims liability or warranty for this information). If you have questions about a medical condition or this instruction, always ask your healthcare professional. [place] [address] any warranty or liability for your use of this information.

## 2018-03-22 ENCOUNTER — TELEPHONE (OUTPATIENT)
Dept: FAMILY MEDICINE CLINIC | Age: 73
End: 2018-03-22

## 2018-03-22 NOTE — TELEPHONE ENCOUNTER
Pt states please send order for Holter monitor to [facility]. States this hospital is closer to his home.

## 2018-03-26 ENCOUNTER — TELEPHONE (OUTPATIENT)
Dept: FAMILY MEDICINE CLINIC | Age: 73
End: 2018-03-26

## 2018-03-26 NOTE — TELEPHONE ENCOUNTER
LPN please call patient and assist with finding location patient is willing to obtain 48 holter monitor within [employer] system. Pt declined [address]. Forwarded to PCP as Efrain Ruiz. Catracho Babcock M.D.   [place] [phone]  [place] [phone]

## 2018-03-27 ENCOUNTER — OFFICE VISIT (OUTPATIENT)
Dept: FAMILY MEDICINE CLINIC | Age: 73
End: 2018-03-27

## 2018-03-27 VITALS
SYSTOLIC BLOOD PRESSURE: 128 MMHG | OXYGEN SATURATION: 98 % | BODY MASS INDEX: 29.62 KG/M2 | DIASTOLIC BLOOD PRESSURE: 80 MMHG | WEIGHT: 200 LBS | HEART RATE: 78 BPM | RESPIRATION RATE: 15 BRPM | TEMPERATURE: 97.7 F | HEIGHT: 69 IN

## 2018-03-27 DIAGNOSIS — I49.9 IRREGULAR HEART BEAT: ICD-10-CM

## 2018-03-27 DIAGNOSIS — H93.90 EAR LESION: ICD-10-CM

## 2018-03-27 DIAGNOSIS — E78.00 PURE HYPERCHOLESTEROLEMIA: ICD-10-CM

## 2018-03-27 DIAGNOSIS — I10 ESSENTIAL HYPERTENSION: Primary | ICD-10-CM

## 2018-03-27 NOTE — TELEPHONE ENCOUNTER
Lmom to inform patient that the closest place to send patient for 48 holter monitor may be [facility]. Advised patient to call me tomorrow to discuss location.

## 2018-03-27 NOTE — PATIENT INSTRUCTIONS
Cardiac Arrhythmia: Care Instructions  Your Care Instructions    A cardiac arrhythmia is a change in the normal rhythm of the heart. Your heart may beat too fast or too slow or beat with an irregular or skipping rhythm. A change in the heart's rhythm may feel like a really strong heartbeat or a fluttering in your chest. A severe heart rhythm problem can keep the body from getting the blood it needs. This can result in shortness of breath, lightheadedness, and fainting. You may take medicine to treat your condition. Your doctor may recommend a pacemaker or recommend catheter ablation to destroy small parts of the heart that are causing a rhythm problem. Another possible treatment is an implantable cardioverter-defibrillator (ICD). An ICD is a device that gives the heart a shock to return the heart to a normal rhythm. Follow-up care is a key part of your treatment and safety. Be sure to make and go to all appointments, and call your doctor if you are having problems. It's also a good idea to know your test results and keep a list of the medicines you take. How can you care for yourself at home? ?General care  ? · Be safe with medicines. Take your medicines exactly as prescribed. Call your doctor if you think you are having a problem with your medicine. You will get more details on the specific medicines your doctor prescribes. ? · If you received a pacemaker or an ICD, you will get a fact sheet about it. ? · Wear medical alert jewelry that says you have an abnormal heart rhythm. You can buy this at most drugstores. ? Lifestyle changes  ? · Eat a heart-healthy diet. ? · Stay at a healthy weight. Lose weight if you need to. ? · Avoid nicotine, too much alcohol, and illegal drugs (meth, speed, and cocaine). Also, get enough sleep and do not overeat. ? · Ask your doctor whether you can take over-the-counter medicines (such as decongestants).  These can make your heart beat fast.   ? · Talk to your doctor about any limits to activities, such as driving, or tasks where you use power tools or ladders. Activity  ? · Start light exercise if your doctor says you can. Even a small amount will help you get stronger, have more energy, and manage your stress. ? · Get regular exercise. Try for 30 minutes on most days of the week. Ask your doctor what level of exercise is safe for you. If activity is not likely to cause health problems, you probably do not have limits on the type or level of activity that you can do. You may want to walk, swim, bike, or do other activities. ? · When you exercise, watch for signs that your heart is working too hard. You are pushing too hard if you cannot talk while you exercise. If you become short of breath or dizzy or have chest pain, sit down and rest.   ? · Check your pulse daily. Place two fingers on the artery at the palm side of your wrist, in line with your thumb. If your heartbeat seems uneven, talk to your doctor. When should you call for help? Call 911 anytime you think you may need emergency care. For example, call if:  ? · You have symptoms of sudden heart failure. These may include:  ¨ Severe trouble breathing. ¨ A fast or irregular heartbeat. ¨ Coughing up pink, foamy mucus. ¨ You passed out. ? · You have signs of a stroke. These include:  ¨ Sudden numbness, paralysis, or weakness in your face, arm, or leg, especially on only one side of your body. ¨ New problems with walking or balance. ¨ Sudden vision changes. ¨ Drooling or slurred speech. ¨ New problems speaking or understanding simple statements, or feeling confused. ¨ A sudden, severe headache that is different from past headaches. ?Call your doctor now or seek immediate medical care if:  ? · You have new or changed symptoms of heart failure, such as:  ¨ New or increased shortness of breath. ¨ New or worse swelling in your legs, ankles, or feet.   ¨ Sudden weight gain, such as more than 2 to 3 pounds in a day or 5 pounds in a week. (Your doctor may suggest a different range of weight gain.)  ¨ Feeling dizzy or lightheaded or like you may faint. ¨ Feeling so tired or weak that you cannot do your usual activities. ¨ Not sleeping well. Shortness of breath wakes you at night. You need extra pillows to prop yourself up to breathe easier. ? Watch closely for changes in your health, and be sure to contact your doctor if:  ? · You do not get better as expected. Where can you learn more? Go to http://dariusz-stanley.info/. Enter K727 in the search box to learn more about \"Cardiac Arrhythmia: Care Instructions. \"  Current as of: September 21, 2016  Content Version: 11.4  © 0013-8226 [employer]. Care instructions adapted under license by [employer] (which disclaims liability or warranty for this information). If you have questions about a medical condition or this instruction, always ask your healthcare professional. [place] [address] any warranty or liability for your use of this information.

## 2018-03-27 NOTE — PROGRESS NOTES
Chief Complaint   Patient presents with    Leg Pain     c/o pains in back both legs. Feels like a 'twan horse'.  Ear Pain     mass on top left ear. Has hx skin cancer on his head. States that he is being weaned off of venlafaxine by Dr. May    1. Have you been to the ER, urgent care clinic since your last visit? Hospitalized since your last visit? No    2. Have you seen or consulted any other health care providers outside of the [address] [place] [address] since your last visit? Include any pap smears or colon screening.  No

## 2018-03-27 NOTE — MR AVS SNAPSHOT
[facility] [address] [place] [address] 
[phone] Patient: Jonny Le MRN: [id number] IHR:[id number] Visit Information Date & Time Provider Department Dept. Phone Encounter #  
 3/27/2018  1:45 PM Malachi Schmidt MD [facility] [phone] [id number] Follow-up Instructions Return in about 3 months (around 6/27/2018). Your Appointments 4/19/2018  2:45 PM  
Follow Up with Danyelle Paul MD  
[address] [facility] ([place]) Appt Note: Return in about 4 weeks (around 4/17/2018) for ANXIETY AND DEPRESSION  
 [address], [place] [address] [address] [address]  
[phone]  
  
   
 [address], [address] [place] [address] [address] [address] Upcoming Health Maintenance Date Due  
 FOOT EXAM Q1 1/3/1955 EYE EXAM RETINAL OR DILATED Q1 1/3/1957 DTaP/Tdap/Td series (1 - Tdap) 1/3/1968 FOBT Q 1 YEAR AGE 50-75 1/3/1997 ZOSTER VACCINE AGE 60> 11/3/2006 GLAUCOMA SCREENING Q2Y 1/3/2012 Pneumococcal 65+ Low/Medium Risk (1 of 2 - PCV13) 1/3/2012 HEMOGLOBIN A1C Q6M 7/11/2018 MICROALBUMIN Q1 10/11/2018 LIPID PANEL Q1 10/11/2018 MEDICARE YEARLY EXAM 10/12/2018 Allergies as of 3/27/2018  Review Complete On: 3/27/2018 By: Malachi Schmidt MD  
 No Known Allergies Current Immunizations  Never Reviewed Name Date Influenza High Dose Vaccine PF 10/11/2017 Not reviewed this visit You Were Diagnosed With   
  
 Codes Comments Essential hypertension    -  Primary ICD-10-CM: I10 
ICD-9-CM: 401.9 Pure hypercholesterolemia     ICD-10-CM: E78.00 ICD-9-CM: 272.0 Irregular heart beat     ICD-10-CM: I49.9 ICD-9-CM: 427.9 Vitals BP Pulse Temp Resp Height(growth percentile) Weight(growth percentile) 128/80 78 97.7 °F (36.5 °C) 15 5' 8.5\" (1.74 m) 200 lb (90.7 kg) SpO2 BMI Smoking Status 98% 29.97 kg/m2 Current Every Day Smoker Vitals History BMI and BSA Data Body Mass Index Body Surface Area  
 29.97 kg/m 2 2.09 m 2 Preferred Pharmacy Pharmacy Name Phone [facility] [id number] [facility] [address] AT [address] [phone] Your Updated Medication List  
  
   
This list is accurate as of 3/27/18  2:22 PM.  Always use your most recent med list.  
  
  
  
  
 aspirin delayed-release 81 mg tablet Take  by mouth daily. atenolol 100 mg tablet Commonly known as:  TENORMIN Take 1 Tab by mouth daily. benazepril 40 mg tablet Commonly known as:  LOTENSIN  
TK 1 T PO QD.  
  
 clonazePAM 1 mg tablet Commonly known as:  Lo Dru Take 1 Tab by mouth two (2) times a day. Max Daily Amount: 2 mg. fenofibrate 160 mg tablet Commonly known as:  LOFIBRA TAKE 1 TABLET BY MOUTH EVERY DAY WITH A MEAL  
  
 glipiZIDE SR 10 mg CR tablet Commonly known as:  GLUCOTROL XL  
TAKE 1 TABLET BY MOUTH EVERY DAY  
  
 * metFORMIN 500 mg tablet Commonly known as:  GLUCOPHAGE  
TAKE 2 TABLETS BY MOUTH TWICE DAILY WITH MEAL  
  
 * metFORMIN 500 mg tablet Commonly known as:  GLUCOPHAGE  
TAKE 2 TABLETS BY MOUTH TWICE DAILY WITH MEALS  
  
 mirtazapine 30 mg tablet Commonly known as:  REMERON  
TAKE 1 TABLET BY MOUTH EVERY NIGHT  
  
 simvastatin 40 mg tablet Commonly known as:  ZOCOR Take 1 Tab by mouth nightly. * venlafaxine-SR 75 mg capsule Commonly known as:  EFFEXOR-XR Take 1 Cap by mouth daily for 14 days. * venlafaxine-SR 37.5 mg capsule Commonly known as:  EFFEXOR-XR Take 1 Cap by mouth daily for 21 days. Start taking on:  4/3/2018 * Notice: This list has 4 medication(s) that are the same as other medications prescribed for you. Read the directions carefully, and ask your doctor or other care provider to review them with you. We Performed the Following AMB POC EKG ROUTINE W/ 12 LEADS, INTER & REP [39035 CPT(R)] Follow-up Instructions Return in about 3 months (around 6/27/2018). Patient Instructions Cardiac Arrhythmia: Care Instructions Your Care Instructions A cardiac arrhythmia is a change in the normal rhythm of the heart. Your heart may beat too fast or too slow or beat with an irregular or skipping rhythm. A change in the heart's rhythm may feel like a really strong heartbeat or a fluttering in your chest. A severe heart rhythm problem can keep the body from getting the blood it needs. This can result in shortness of breath, lightheadedness, and fainting. You may take medicine to treat your condition. Your doctor may recommend a pacemaker or recommend catheter ablation to destroy small parts of the heart that are causing a rhythm problem. Another possible treatment is an implantable cardioverter-defibrillator (ICD). An ICD is a device that gives the heart a shock to return the heart to a normal rhythm. Follow-up care is a key part of your treatment and safety. Be sure to make and go to all appointments, and call your doctor if you are having problems. It's also a good idea to know your test results and keep a list of the medicines you take. How can you care for yourself at home? ?General care ? · Be safe with medicines. Take your medicines exactly as prescribed. Call your doctor if you think you are having a problem with your medicine. You will get more details on the specific medicines your doctor prescribes. ? · If you received a pacemaker or an ICD, you will get a fact sheet about it. ? · Wear medical alert jewelry that says you have an abnormal heart rhythm. You can buy this at most [employer]. ? Lifestyle changes ? · Eat a heart-healthy diet. ? · Stay at a healthy weight. Lose weight if you need to.   
? · Avoid nicotine, too much alcohol, and illegal drugs (meth, speed, and cocaine). Also, get enough sleep and do not overeat. ? · Ask your doctor whether you can take over-the-counter medicines (such as decongestants). These can make your heart beat fast.  
? · Talk to your doctor about any limits to activities, such as driving, or tasks where you use power tools or ladders. Activity ? · Start light exercise if your doctor says you can. Even a small amount will help you get stronger, have more energy, and manage your stress. ? · Get regular exercise. Try for 30 minutes on most days of the week. Ask your doctor what level of exercise is safe for you. If activity is not likely to cause health problems, you probably do not have limits on the type or level of activity that you can do. You may want to walk, swim, bike, or do other activities. ? · When you exercise, watch for signs that your heart is working too hard. You are pushing too hard if you cannot talk while you exercise. If you become short of breath or dizzy or have chest pain, sit down and rest.  
? · Check your pulse daily. Place two fingers on the artery at the palm side of your wrist, in line with your thumb. If your heartbeat seems uneven, talk to your doctor. When should you call for help? Call 911 anytime you think you may need emergency care. For example, call if: 
? · You have symptoms of sudden heart failure. These may include: ¨ Severe trouble breathing. ¨ A fast or irregular heartbeat. ¨ Coughing up pink, foamy mucus. ¨ You passed out. ? · You have signs of a stroke. These include: 
¨ Sudden numbness, paralysis, or weakness in your face, arm, or leg, especially on only one side of your body. ¨ New problems with walking or balance. ¨ Sudden vision changes. ¨ Drooling or slurred speech. ¨ New problems speaking or understanding simple statements, or feeling confused. ¨ A sudden, severe headache that is different from past headaches. ?Call your doctor now or seek immediate medical care if: ? · You have new or changed symptoms of heart failure, such as: ¨ New or increased shortness of breath. ¨ New or worse swelling in your legs, ankles, or feet. ¨ Sudden weight gain, such as more than 2 to 3 pounds in a day or 5 pounds in a week. (Your doctor may suggest a different range of weight gain.) ¨ Feeling dizzy or lightheaded or like you may faint. ¨ Feeling so tired or weak that you cannot do your usual activities. ¨ Not sleeping well. Shortness of breath wakes you at night. You need extra pillows to prop yourself up to breathe easier. ? Watch closely for changes in your health, and be sure to contact your doctor if: 
? · You do not get better as expected. Where can you learn more? Go to http://dariusz-stanley.info/. Enter B248 in the search box to learn more about \"Cardiac Arrhythmia: Care Instructions. \" Current as of: September 21, 2016 Content Version: 11.4 © 3083-3212 [employer]. Care instructions adapted under license by [employer] (which disclaims liability or warranty for this information). If you have questions about a medical condition or this instruction, always ask your healthcare professional. [place] [address] any warranty or liability for your use of this information. Introducing [facility] & HEALTH SERVICES! Dear Gretta Marin: Thank you for requesting a [employer] account. Our records indicate that you already have an active [employer] account. You can access your account anytime at [email or web address]. [email or web address] Did you know that you can access your hospital and ER discharge instructions at any time in [employer]? You can also review all of your test results from your hospital stay or ER visit. Additional Information If you have questions, please visit the Frequently Asked Questions section of the [employer] website at [email or web address]/. Remember, [employer] is NOT to be used for urgent needs. For medical emergencies, dial 911. Now available from your iPhone and Android! Please provide this summary of care documentation to your next provider. Your primary care clinician is listed as [address]. If you have any questions after today's visit, please call [phone].

## 2018-03-27 NOTE — PROGRESS NOTES
Idalia Suarez is a 72 y.o. male  presents for follow up after seeing Dr Kalin Almeida. No chest pain or palpitations. He also has lesion on left upper ear lobe. It is getting bigger over time. He has assoc pain. No Known Allergies  Outpatient Prescriptions Marked as Taking for the 3/27/18 encounter (Office Visit) with Kristen Bauman MD   Medication Sig Dispense Refill    clonazePAM (KLONOPIN) 1 mg tablet Take 1 Tab by mouth two (2) times a day. Max Daily Amount: 2 mg. 60 Tab 0    venlafaxine-SR (EFFEXOR-XR) 75 mg capsule Take 1 Cap by mouth daily for 14 days. 14 Cap 0    [START ON 4/3/2018] venlafaxine-SR (EFFEXOR-XR) 37.5 mg capsule Take 1 Cap by mouth daily for 21 days. 21 Cap 0    mirtazapine (REMERON) 30 mg tablet TAKE 1 TABLET BY MOUTH EVERY NIGHT 90 Tab 1    atenolol (TENORMIN) 100 mg tablet Take 1 Tab by mouth daily. 90 Tab 0    metFORMIN (GLUCOPHAGE) 500 mg tablet TAKE 2 TABLETS BY MOUTH TWICE DAILY WITH MEALS 360 Tab 0    glipiZIDE SR (GLUCOTROL XL) 10 mg CR tablet TAKE 1 TABLET BY MOUTH EVERY DAY 90 Tab 0    benazepril (LOTENSIN) 40 mg tablet TK 1 T PO QD. 90 Tab 1    fenofibrate (LOFIBRA) 160 mg tablet TAKE 1 TABLET BY MOUTH EVERY DAY WITH A MEAL 90 Tab 5    simvastatin (ZOCOR) 40 mg tablet Take 1 Tab by mouth nightly.  90 Tab 0    metFORMIN (GLUCOPHAGE) 500 mg tablet TAKE 2 TABLETS BY MOUTH TWICE DAILY WITH MEAL 360 Tab 0     Patient Active Problem List   Diagnosis Code    Essential hypertension I10    Controlled type 2 diabetes mellitus without complication, without long-term current use of insulin (HCC) E11.9    Hyperlipidemia E78.5    Chronic left shoulder pain M25.512, G89.29    Pain management contract agreement Z02.89    ACP (advance care planning) Z71.89    Type 2 diabetes mellitus with nephropathy (HCC) E11.21    Recurrent depression ([facility] F33.9    TACHO (generalized anxiety disorder) F41.1    MDD (major depressive disorder), recurrent episode, moderate ([address] F33.1    Palpitations R00.2     Past Medical History:   Diagnosis Date    Depression     Diabetes ([facility]     Hypercholesterolemia     Hypertension      Social History     Social History    Marital status:      Spouse name: N/A    Number of children: N/A    Years of education: N/A     Social History Main Topics    Smoking status: Current Every Day Smoker     Packs/day: 0.50    Smokeless tobacco: Never Used    Alcohol use No    Drug use: None    Sexual activity: Not Asked     Other Topics Concern    None     Social History Narrative     Family History   Problem Relation Age of Onset    Emphysema Mother     Hypertension Father     Heart Attack Father     Emphysema Paternal Grandmother         Review of Systems   Constitutional: Negative for chills, diaphoresis and fever. Cardiovascular: Negative for chest pain and palpitations. Gastrointestinal: Negative for constipation, diarrhea, nausea and vomiting. Vitals:    03/27/18 1356   BP: 128/80   Pulse: 78   Resp: 15   Temp: 97.7 °F (36.5 °C)   SpO2: 98%   Weight: 200 lb (90.7 kg)   Height: 5' 8.5\" (1.74 m)   PainSc:   6   PainLoc: Leg       Physical Exam   Constitutional: He is oriented to person, place, and time and well-developed, well-nourished, and in no distress. Neck: Normal range of motion. Neck supple. No thyromegaly present. Cardiovascular: Normal rate, regular rhythm and normal heart sounds. Pulmonary/Chest: Effort normal and breath sounds normal.   Neurological: He is alert and oriented to person, place, and time. Skin: Skin is warm and dry. Nursing note and vitals reviewed. left upper ear keratinized lesion on upper lobe. Non tender     Assessment/Plan      ICD-10-CM ICD-9-CM    1. Essential hypertension I10 401.9    2. Pure hypercholesterolemia E78.00 272.0    3. Irregular heart beat I49.9 427.9 AMB POC EKG ROUTINE W/ 12 LEADS, INTER & REP      CANCELED: CARDIAC HOLTER MONITOR, 24 HOURS   4.  Ear lesion H61.899 380.89 REFERRAL TO DERMATOLOGY     Follow-up Disposition:  Return in about 3 months (around 6/27/2018). lab results and schedule of future lab studies reviewed with patient    I have discussed the diagnosis with the patient and the intended plan of care as seen in the above orders. The patient has received an after-visit summary and questions were answered concerning future plans. I have discussed medication, side effects, and warnings with the patient in detail. The patient verbalized understanding and is in agreement with the plan of care. The patient will contact the office with any additional concerns.     Concepcion Solares MD

## 2018-03-28 NOTE — TELEPHONE ENCOUNTER
Informed patient the closest hospital to him to  the 48 hr monitor will be [facility] in [place]. Patient will call hospital and find out where he needs to go to get it. Informed patient to call me if he need assists.

## 2018-04-13 RX ORDER — GLIPIZIDE 10 MG/1
TABLET, FILM COATED, EXTENDED RELEASE ORAL
Qty: 90 TAB | Refills: 0 | Status: SHIPPED | OUTPATIENT
Start: 2018-04-13 | End: 2018-05-11 | Stop reason: SDUPTHER

## 2018-04-19 ENCOUNTER — OFFICE VISIT (OUTPATIENT)
Dept: BEHAVIORAL/MENTAL HEALTH CLINIC | Age: 73
End: 2018-04-19

## 2018-04-19 VITALS
SYSTOLIC BLOOD PRESSURE: 135 MMHG | HEART RATE: 80 BPM | WEIGHT: 200.8 LBS | OXYGEN SATURATION: 96 % | HEIGHT: 69 IN | BODY MASS INDEX: 29.74 KG/M2 | RESPIRATION RATE: 18 BRPM | DIASTOLIC BLOOD PRESSURE: 74 MMHG | TEMPERATURE: 97.1 F

## 2018-04-19 DIAGNOSIS — F41.1 GAD (GENERALIZED ANXIETY DISORDER): Primary | ICD-10-CM

## 2018-04-19 DIAGNOSIS — F33.1 MDD (MAJOR DEPRESSIVE DISORDER), RECURRENT EPISODE, MODERATE (HCC): ICD-10-CM

## 2018-04-19 RX ORDER — ALPRAZOLAM 0.5 MG/1
0.5 TABLET ORAL
Qty: 90 TAB | Refills: 0 | Status: SHIPPED | OUTPATIENT
Start: 2018-07-19 | End: 2018-07-05 | Stop reason: SDUPTHER

## 2018-04-19 RX ORDER — MIRTAZAPINE 30 MG/1
30 TABLET, FILM COATED ORAL
Qty: 30 TAB | Refills: 2 | Status: SHIPPED | OUTPATIENT
Start: 2018-04-19 | End: 2018-05-14

## 2018-04-19 RX ORDER — ALPRAZOLAM 0.5 MG/1
0.5 TABLET ORAL
Qty: 90 TAB | Refills: 0 | Status: SHIPPED | OUTPATIENT
Start: 2018-05-19 | End: 2018-04-20 | Stop reason: SDUPTHER

## 2018-04-19 RX ORDER — ALPRAZOLAM 0.5 MG/1
0.5 TABLET ORAL
Qty: 90 TAB | Refills: 0 | Status: SHIPPED | OUTPATIENT
Start: 2018-04-19 | End: 2018-05-19

## 2018-04-19 NOTE — PROGRESS NOTES
Adult Psychiatry Progress Note    Assessment, and Plan:      ASSESSMENT:   Mr. Jeremiah Orozco is a 73 y/o male sx consistent with MDD recurrent moderate and TACOH. Low risk for acute SI, low-moderate risk for chronic suicide. Slightly worsening of depressive and anxiety sx since last visit. Feels clonazepam is not helpful, xanax was more helpful. Will d/c clonazepam and restart xanax at 0.5 mg TID PRN, continue  remeron 30 mg nightly. Discussed r/b/se of medications. . Recommended discussing sleep study referral with PCP to r/o XIMENA given high risk based on HPI assessment at initial visit. Strongly encouraged talk therpay to assist with treatment of anxiety and improve coping skills, re-assess readiness for benzo taper in future. Recommended increase social supports, healthy diet and exercise. Will continue to follow. PLAN:   Problem Diagnosis: MDD recurrent moderate TACHO  Prognosis: fair  Goals: decrease sx to improve social functioning  Biologic: SLEEP STUDY REFERRAL (DEFER TO PCP),  Medication: remeron to 30 mg nightly, Start alprazolam 0.5 mg TID prn, d/c clonazepam 1 mg BID,  Psychosocial: increase social supports, support group  Therapy: referral info given to patient, again  Behavioral: diet, exercise, meditation  Follow up: 3 months       Assessment and plan outlined above discussed with patient who voiced understanding and agreement. Author: Sukhdeep Garcia MD as of: 4/19/2018 at 2:37 PM.    Note follows below:  Patient Identifying Information     Jeremiah Orozco is a 72 y.o. male presenting with MDD moderate and TACHO. Patient came to psychiatry on a voluntarily basis. Chief Complaint:  Anxiety and Depression      Stated Chief Complaint: follow up    History of Presenting Illness:      Last seen 3/20/2018. Since last visit reports things \"things are about the same\" feeling \"pretty good. \"  Anxiety is \"worse than last visit\", depressive sx are \"great\".   Palpitations last occurred resolved, Eating 1 meals a day \"munch on stuff all day\", \"i don't have an appetite\", water intake 3-4 big glasses, exercise includes up and down stairs every day. Plans to start walking with improved weather. Taking mirtazapine 30 mg nightly, no change in appetite, does help with sleep, \"better than I used to. \"  For past 2 weeks reports nearly daily feeling nervous on edge, trouble relaxing, feeling afraid, appetite fluctuations, fidgeting, more than half days of trouble sleeping, low energy, feeling bad about self, trouble concentrating, not being able to control worry, restlessness. No SI/HI. Tapering off effexor last dose >5 days ago. Taking 1 mg of clonazepam twice per day, helping some with anxiety. Not as effective as xanax. Did not call for therapy appointment. Sleep study not yet. Denies harjeet, panic attacks, OCD sx     Violence History/Risk:       History of violence: No  Current access to firearm: No  Recent or current violent ideation: No      Suicidal Ideation and Behavior History/Risk:       Current suicidal thoughts: No  Previous suicide attempts: No  Previous self-injurious behavior/risky behavior: No  Previous suicidal ideation: passive SI prior to last visit  Access to stockpile pills: No  Impulsivity: No      Substance Abuse History   Recreational Drugs: No  Use of Alcohol: used to drink a lot of beer self medicating, stopped 2010  Tobacco Use: 1/2 ppd  Abuse of medications: No h/o  Legal Consequences of chemical use: No      Past Psychiatric and Medical History, Social History, Past Surgical History and Family History: reviewed and updated in EMR as appropriate. Medications: Reviewed and updated in EMR as appropriate. Allergies: Reviewed and updated in EMR as appropriate. Current Outpatient Prescriptions   Medication Sig    glipiZIDE SR (GLUCOTROL XL) 10 mg CR tablet TAKE 1 TABLET BY MOUTH EVERY DAY    clonazePAM (KLONOPIN) 1 mg tablet Take 1 Tab by mouth two (2) times a day. Max Daily Amount: 2 mg.     venlafaxine-SR (EFFEXOR-XR) 37.5 mg capsule Take 1 Cap by mouth daily for 21 days.  mirtazapine (REMERON) 30 mg tablet TAKE 1 TABLET BY MOUTH EVERY NIGHT    atenolol (TENORMIN) 100 mg tablet Take 1 Tab by mouth daily.  metFORMIN (GLUCOPHAGE) 500 mg tablet TAKE 2 TABLETS BY MOUTH TWICE DAILY WITH MEALS    benazepril (LOTENSIN) 40 mg tablet TK 1 T PO QD.    fenofibrate (LOFIBRA) 160 mg tablet TAKE 1 TABLET BY MOUTH EVERY DAY WITH A MEAL    simvastatin (ZOCOR) 40 mg tablet Take 1 Tab by mouth nightly.  metFORMIN (GLUCOPHAGE) 500 mg tablet TAKE 2 TABLETS BY MOUTH TWICE DAILY WITH MEAL    aspirin delayed-release 81 mg tablet Take  by mouth daily. No current facility-administered medications for this visit.           Objective/Exam:       Visit Vitals    /74    Pulse 80    Temp 97.1 °F (36.2 °C)    Resp 18    Ht 5' 8.5\" (1.74 m)    Wt 200 lb 12.8 oz (91.1 kg)    SpO2 96%    BMI 30.09 kg/m2       General: sitting comfortably in no acute distress     Mental Status Evaluation:       Appearance Elderly male dressed casually, good GH, good EC   Attitude Cooperative,    Behavior:  No PMA/R   Speech:  Normal, spontaenous   Mood:  Appears irritable with underlying anxiety   Affect:  Congruent and reactive   Thought Process:  Linear, goal directed   Thought Content:  As per [facility]   Fund of Knowledge Good   SI/HI/Psychosis Denies   Sensorium:  Alert   Cognition:  Grossly intact, not formally assessed   Insight:  fair   Judgment: fair                                       Impulse Control:  good         PHQ-9: 14, very difficult  TACHO-7: 15, very difficult    3/20/2018 PHQ-9: 9, not difficult at all  3/20/2018 TACHO-7: 8, somewhat difficult     2/20/2018 PHQ-9: 25, very difficult to extremely difficult  2/20/2018 TACHO-7: 20, very difficult to extremely difficult     Pertinent Labs and Studies   N/A    Acute risk for:    Danger to self:  LOW  Danger to others: LOW  Grave disability: LOW        Diagnosis: Axis I: MDD recurrent moderate TACHO     Medication Reconciliation:      Medication reconciliation completed? YES  Includes discharge medication reconciliation? YES  PMPx reviewed, no aberrances noted       More than 50% of this 25 min visit was spent face to face counseling the patient about the etiology and treatment options for the above health conditions outlined in assessment and plan      Sukhdeep Garcia M.D.   [place] [address] [address] [place] [address]

## 2018-04-19 NOTE — MR AVS SNAPSHOT
[address] [place] [address] Fito Starks [place] [address] [phone] Mir[address] [place] [address] 
[phone] Patient: June Fine MRN: [id number] MILIND:[id number] Visit Information Date & Time Provider Department Dept. Phone Encounter #  
 4/19/2018  2:45 PM Libertad White MD [address] [place] [address] [id number] Follow-up Instructions Return in about 3 months (around 7/19/2018), or if symptoms worsen or fail to improve, for anxiety. Your Appointments 6/27/2018  1:30 PM  
FOLLOW UP EXAM with Clarita Wilkins MD  
[facility]) Appt Note: 3 month f/u irreg heart beat  
 [address] [employer] [address] [place] [phone]  
  
   
 [facility] [address] Upcoming Health Maintenance Date Due  
 FOOT EXAM Q1 1/3/1955 EYE EXAM RETINAL OR DILATED Q1 1/3/1957 DTaP/Tdap/Td series (1 - Tdap) 1/3/1968 FOBT Q 1 YEAR AGE 50-75 1/3/1997 ZOSTER VACCINE AGE 60> 11/3/2006 GLAUCOMA SCREENING Q2Y 1/3/2012 Pneumococcal 65+ Low/Medium Risk (1 of 2 - PCV13) 1/3/2012 HEMOGLOBIN A1C Q6M 7/11/2018 MICROALBUMIN Q1 10/11/2018 LIPID PANEL Q1 10/11/2018 MEDICARE YEARLY EXAM 10/12/2018 Allergies as of 4/19/2018  Review Complete On: 4/19/2018 By: Martha Monte LPN No Known Allergies Current Immunizations  Never Reviewed Name Date Influenza High Dose Vaccine PF 10/11/2017 Not reviewed this visit You Were Diagnosed With   
  
 Codes Comments TACHO (generalized anxiety disorder)    -  Primary ICD-10-CM: F41.1 ICD-9-CM: 300.02   
 MDD (major depressive disorder), recurrent episode, moderate (HCC)     ICD-10-CM: F33.1 ICD-9-CM: 296.32 Vitals BP Pulse Temp Resp Height(growth percentile) Weight(growth percentile) 135/74 80 97.1 °F (36.2 °C) 18 5' 8.5\" (1.74 m) 200 lb 12.8 oz (91.1 kg) SpO2 BMI Smoking Status 96% 30.09 kg/m2 Current Every Day Smoker Vitals History BMI and BSA Data Body Mass Index Body Surface Area 30.09 kg/m 2 2.1 m 2 Preferred Pharmacy Pharmacy Name Phone [facility] [phone] - [phone], [address] [facility] [address] AT [address] [phone] Your Updated Medication List  
  
   
This list is accurate as of 4/19/18  3:20 PM.  Always use your most recent med list.  
  
  
  
  
 * ALPRAZolam 0.5 mg tablet Commonly known as:  Benji Ny Take 1 Tab by mouth three (3) times daily as needed for Anxiety for up to 30 days. Max Daily Amount: 1.5 mg.  
  
 * ALPRAZolam 0.5 mg tablet Commonly known as:  Benji Ny Take 1 Tab by mouth three (3) times daily as needed for Anxiety. Max Daily Amount: 1.5 mg.  
Start taking on:  5/19/2018 * ALPRAZolam 0.5 mg tablet Commonly known as:  Benji Ny Take 1 Tab by mouth three (3) times daily as needed for Anxiety. Max Daily Amount: 1.5 mg.  
Start taking on:  7/19/2018  
  
 aspirin delayed-release 81 mg tablet Take  by mouth daily. atenolol 100 mg tablet Commonly known as:  TENORMIN Take 1 Tab by mouth daily. benazepril 40 mg tablet Commonly known as:  LOTENSIN  
TK 1 T PO QD.  
  
 clonazePAM 1 mg tablet Commonly known as:  Zachary Cuauhtemoc Take 1 Tab by mouth two (2) times a day. Max Daily Amount: 2 mg. fenofibrate 160 mg tablet Commonly known as:  LOFIBRA TAKE 1 TABLET BY MOUTH EVERY DAY WITH A MEAL  
  
 glipiZIDE SR 10 mg CR tablet Commonly known as:  GLUCOTROL XL  
TAKE 1 TABLET BY MOUTH EVERY DAY  
  
 * metFORMIN 500 mg tablet Commonly known as:  GLUCOPHAGE  
TAKE 2 TABLETS BY MOUTH TWICE DAILY WITH MEAL  
  
 * metFORMIN 500 mg tablet Commonly known as:  GLUCOPHAGE  
TAKE 2 TABLETS BY MOUTH TWICE DAILY WITH MEALS  
  
 mirtazapine 30 mg tablet Commonly known as:  Alcon Seats Take 1 Tab by mouth nightly for 90 days. simvastatin 40 mg tablet Commonly known as:  ZOCOR Take 1 Tab by mouth nightly. venlafaxine-SR 37.5 mg capsule Commonly known as:  EFFEXOR-XR Take 1 Cap by mouth daily for 21 days. * Notice: This list has 5 medication(s) that are the same as other medications prescribed for you. Read the directions carefully, and ask your doctor or other care provider to review them with you. Prescriptions Printed Refills ALPRAZolam (XANAX) 0.5 mg tablet 0 Sig: Take 1 Tab by mouth three (3) times daily as needed for Anxiety for up to 30 days. Max Daily Amount: 1.5 mg.  
 Class: Print Route: Oral  
 ALPRAZolam (XANAX) 0.5 mg tablet 0 Starting on: 5/19/2018 Sig: Take 1 Tab by mouth three (3) times daily as needed for Anxiety. Max Daily Amount: 1.5 mg.  
 Class: Print Route: Oral  
 ALPRAZolam (XANAX) 0.5 mg tablet 0 Starting on: 7/19/2018 Sig: Take 1 Tab by mouth three (3) times daily as needed for Anxiety. Max Daily Amount: 1.5 mg.  
 Class: Print Route: Oral  
  
Prescriptions Sent to Pharmacy Refills  
 mirtazapine (REMERON) 30 mg tablet 2 Sig: Take 1 Tab by mouth nightly for 90 days. Class: Normal  
 Pharmacy: [facility] [address] [place] [address] AT [address] Ph #: [phone] Route: Oral  
  
Follow-up Instructions Return in about 3 months (around 7/19/2018), or if symptoms worsen or fail to improve, for anxiety. Patient Instructions CALL BELOW TO SCHEDULE TALK THERAPY/COUNSELING:  
Natalee Arias 47.. 
Dr. Marito Natarajan, [address], [address] [place] [address] [address] [facility] Phone: [phone] 
  
 
FOR ANXIETY AND DEPRESSION:  
STOP taking clonazepam 
Start taking alprazolam 0.5 mg three times a day as needed for anxiety.  DO NOT MIX WITH OPIATES, MUSCLE RELAXER OR ALCOHOL as this can cause deadly combination of trouble breathing and accidental overdose 
continue takig Remeron/Mirtazapine 30 mg nightly No need to restart effexor/venlafaxine LIFESTYLE CHANGES:  
START EATING TWO TO THREE MEALS A DAY STARTING WITH BREAKFAST 
START DRINKING 2 LITERS OF WATER PER DAY (65 OUNCES) EXERCISE 30 MINUTES A DAY, STAIRS ARE OKAY WORK ON MEDITATION OUTLINED BELOW INCREASE SOCIAL SUPPORTS, CONSIDER JOINING SUPPORT GROUP 
TALK MORE TO YOUR SON ABOUT YOUR MOOD/STRESS/ANXIETY SYMPTOMS AND LEVEL OF DISTRESS 
FOR SLEEP TALK TO PCP DR. CARR ABOUT GETTING A SLEEP STUDY 
  
If you are having thoughts of harming yourself or others please report to local hospital for evaluation or call suicide hotline 0  
  
  
HOW TO MEDITATE: SIMPLE MEDITATION FOR BEGINNERS This meditation exercise is an excellent introduction to meditation techniques. Sit comfortably. You may even want to invest in a meditation chair. Close your eyes. Make no effort to control the breath; simply breathe naturally. Focus your attention on the breath  (think \"im breathing in\" when breathing in, think \"im breathing out\" when breathing out) and on how the body moves with each inhalation and exhalation. Notice the movement of your body as you breathe. Observe your chest, shoulders, rib cage, and belly. Simply focus your attention on your breath without controlling its pace or intensity. If your mind wanders, return your focus back to your breath. Focus on breathing in as you take in a breath and focus on breathing out as you exhale Maintain this meditation practice for two to three minutes to start, and then try it for longer periods gradually extended to 5 to 10 minutes daily. 
  
 
 
  
Anxiety Disorder: Care Instructions Your Care Instructions Anxiety is a normal reaction to stress. Difficult situations can cause you to have symptoms such as sweaty palms and a nervous feeling. In an anxiety disorder, the symptoms are far more severe. Constant worry, muscle tension, trouble sleeping, nausea and diarrhea, and other symptoms can make normal daily activities difficult or impossible. These symptoms may occur for no reason, and they can affect your work, school, or social life. Medicines, counseling, and self-care can all help. Follow-up care is a key part of your treatment and safety. Be sure to make and go to all appointments, and call your doctor if you are having problems. It's also a good idea to know your test results and keep a list of the medicines you take. How can you care for yourself at home? · Take medicines exactly as directed. Call your doctor if you think you are having a problem with your medicine. · Go to your counseling sessions and follow-up appointments. · Recognize and accept your anxiety. Then, when you are in a situation that makes you anxious, say to yourself, \"This is not an emergency. I feel uncomfortable, but I am not in danger. I can keep going even if I feel anxious. \" · Be kind to your body: ¨ Relieve tension with exercise or a massage. ¨ Get enough rest. 
¨ Avoid alcohol, caffeine, nicotine, and illegal drugs. They can increase your anxiety level and cause sleep problems. ¨ Learn and do relaxation techniques. See below for more about these techniques. · Engage your mind. Get out and do something you enjoy. Go to a funny movie, or take a walk or hike. Plan your day. Having too much or too little to do can make you anxious. · Keep a record of your symptoms. Discuss your fears with a good friend or family member, or join a support group for people with similar problems. Talking to others sometimes relieves stress. · Get involved in social groups, or volunteer to help others. Being alone sometimes makes things seem worse than they are.  
· Get at least 30 minutes of exercise on most days of the week to relieve stress. Walking is a good choice. You also may want to do other activities, such as running, swimming, cycling, or playing tennis or team sports. Relaxation techniques Do relaxation exercises 10 to 20 minutes a day. You can play soothing, relaxing music while you do them, if you wish. · Tell others in your house that you are going to do your relaxation exercises. Ask them not to disturb you. · Find a comfortable place, away from all distractions and noise. · Lie down on your back, or sit with your back straight. · Focus on your breathing. Make it slow and steady. · Breathe in through your nose. Breathe out through either your nose or mouth. · Breathe deeply, filling up the area between your navel and your rib cage. Breathe so that your belly goes up and down. · Do not hold your breath. · Breathe like this for 5 to 10 minutes. Notice the feeling of calmness throughout your whole body. As you continue to breathe slowly and deeply, relax by doing the following for another 5 to 10 minutes: · Tighten and relax each muscle group in your body. You can begin at your toes and work your way up to your head. · Imagine your muscle groups relaxing and becoming heavy. · Empty your mind of all thoughts. · Let yourself relax more and more deeply. · Become aware of the state of calmness that surrounds you. · When your relaxation time is over, you can bring yourself back to alertness by moving your fingers and toes and then your hands and feet and then stretching and moving your entire body. Sometimes people fall asleep during relaxation, but they usually wake up shortly afterward. · Always give yourself time to return to full alertness before you drive a car or do anything that might cause an accident if you are not fully alert. Never play a relaxation tape while you drive a car. When should you call for help? Call 911 anytime you think you may need emergency care. For example, call if: ? · You feel you cannot stop from hurting yourself or someone else. ? Keep the numbers for these national suicide hotlines: 5-018-337-TALK (0-923.713.3673) and 7-786-ETEFDWP (3-695.813.3842). If you or someone you know talks about suicide or feeling hopeless, get help right away. ? Watch closely for changes in your health, and be sure to contact your doctor if: 
? · You have anxiety or fear that affects your life. ? · You have symptoms of anxiety that are new or different from those you had before. Where can you learn more? Go to http://dariusz-stanley.info/. Enter P754 in the search box to learn more about \"Anxiety Disorder: Care Instructions. \" Current as of: May 12, 2017 Content Version: 11.4 © 0409-2920 [employer]. Care instructions adapted under license by [employer] (which disclaims liability or warranty for this information). If you have questions about a medical condition or this instruction, always ask your healthcare professional. [address] any warranty or liability for your use of this information. 
 
   
 
 
  
Introducing [facility] HE[facility]! Dear Kendal Parra: Thank you for requesting a [employer] account. Our records indicate that you already have an active [employer] account. You can access your account anytime at [email or web address] Did you know that you can access your hospital and ER discharge instructions at any time in [employer] You can also review all of your test results from your hospital stay or ER visit. Additional Information If you have questions, please visit the Frequently Asked Questions section of the [employer] website at [email or web address] Remember, [employer] is NOT to be used for urgent needs. For medical emergencies, dial 911. Now available from your iPhone and Android! Please provide this summary of care documentation to your next provider. Your primary care clinician is listed as [id number] [facility]. If you have any questions after today's visit, please call [phone].

## 2018-04-19 NOTE — PATIENT INSTRUCTIONS
CALL BELOW TO SCHEDULE TALK THERAPY/COUNSELING:   Nat[address]  Dr. Abel Richardson, [address] [place] [address], [address] [place] [address] [facility]  [place], [address] [place] [address]  Phone: [phone]       FOR ANXIETY AND DEPRESSION:   STOP taking clonazepam  Start taking alprazolam 0.5 mg three times a day as needed for anxiety. DO NOT MIX WITH OPIATES, MUSCLE RELAXER OR ALCOHOL as this can cause deadly combination of trouble breathing and accidental overdose  continue takig Remeron/Mirtazapine 30 mg nightly  No need to restart effexor/venlafaxine    LIFESTYLE CHANGES:   START EATING TWO TO THREE MEALS A DAY STARTING WITH BREAKFAST  START DRINKING 2 LITERS OF WATER PER DAY (65 OUNCES)  EXERCISE 30 MINUTES A DAY, STAIRS ARE OKAY  WORK ON MEDITATION OUTLINED BELOW  INCREASE SOCIAL SUPPORTS, CONSIDER JOINING SUPPORT GROUP  TALK MORE TO YOUR SON ABOUT YOUR MOOD/STRESS/ANXIETY SYMPTOMS AND LEVEL OF DISTRESS  FOR SLEEP TALK TO PCP DR. CARR ABOUT GETTING A SLEEP STUDY     If you are having thoughts of harming yourself or others please report to local hospital for evaluation or call suicide hotline 4         HOW TO MEDITATE: SIMPLE MEDITATION FOR BEGINNERS  This meditation exercise is an excellent introduction to meditation techniques. Sit comfortably. You may even want to invest in a meditation chair. Close your eyes. Make no effort to control the breath; simply breathe naturally. Focus your attention on the breath  (think \"im breathing in\" when breathing in, think \"im breathing out\" when breathing out) and on how the body moves with each inhalation and exhalation. Notice the movement of your body as you breathe. Observe your chest, shoulders, rib cage, and belly. Simply focus your attention on your breath without controlling its pace or intensity. If your mind wanders, return your focus back to your breath.   Focus on breathing in as you take in a breath and focus on breathing out as you exhale  Maintain this meditation practice for two to three minutes to start, and then try it for longer periods gradually extended to 5 to 10 minutes daily.            Anxiety Disorder: Care Instructions  Your Care Instructions    Anxiety is a normal reaction to stress. Difficult situations can cause you to have symptoms such as sweaty palms and a nervous feeling. In an anxiety disorder, the symptoms are far more severe. Constant worry, muscle tension, trouble sleeping, nausea and diarrhea, and other symptoms can make normal daily activities difficult or impossible. These symptoms may occur for no reason, and they can affect your work, school, or social life. Medicines, counseling, and self-care can all help. Follow-up care is a key part of your treatment and safety. Be sure to make and go to all appointments, and call your doctor if you are having problems. It's also a good idea to know your test results and keep a list of the medicines you take. How can you care for yourself at home? · Take medicines exactly as directed. Call your doctor if you think you are having a problem with your medicine. · Go to your counseling sessions and follow-up appointments. · Recognize and accept your anxiety. Then, when you are in a situation that makes you anxious, say to yourself, \"This is not an emergency. I feel uncomfortable, but I am not in danger. I can keep going even if I feel anxious. \"  · Be kind to your body:  ¨ Relieve tension with exercise or a massage. ¨ Get enough rest.  ¨ Avoid alcohol, caffeine, nicotine, and illegal drugs. They can increase your anxiety level and cause sleep problems. ¨ Learn and do relaxation techniques. See below for more about these techniques. · Engage your mind. Get out and do something you enjoy. Go to a funny movie, or take a walk or hike. Plan your day. Having too much or too little to do can make you anxious. · Keep a record of your symptoms.  Discuss your fears with a good friend or family member, or join a support group for people with similar problems. Talking to others sometimes relieves stress. · Get involved in social groups, or volunteer to help others. Being alone sometimes makes things seem worse than they are. · Get at least 30 minutes of exercise on most days of the week to relieve stress. Walking is a good choice. You also may want to do other activities, such as running, swimming, cycling, or playing tennis or team sports. Relaxation techniques  Do relaxation exercises 10 to 20 minutes a day. You can play soothing, relaxing music while you do them, if you wish. · Tell others in your house that you are going to do your relaxation exercises. Ask them not to disturb you. · Find a comfortable place, away from all distractions and noise. · Lie down on your back, or sit with your back straight. · Focus on your breathing. Make it slow and steady. · Breathe in through your nose. Breathe out through either your nose or mouth. · Breathe deeply, filling up the area between your navel and your rib cage. Breathe so that your belly goes up and down. · Do not hold your breath. · Breathe like this for 5 to 10 minutes. Notice the feeling of calmness throughout your whole body. As you continue to breathe slowly and deeply, relax by doing the following for another 5 to 10 minutes:  · Tighten and relax each muscle group in your body. You can begin at your toes and work your way up to your head. · Imagine your muscle groups relaxing and becoming heavy. · Empty your mind of all thoughts. · Let yourself relax more and more deeply. · Become aware of the state of calmness that surrounds you. · When your relaxation time is over, you can bring yourself back to alertness by moving your fingers and toes and then your hands and feet and then stretching and moving your entire body. Sometimes people fall asleep during relaxation, but they usually wake up shortly afterward.   · Always give yourself time to return to full alertness before you drive a car or do anything that might cause an accident if you are not fully alert. Never play a relaxation tape while you drive a car. When should you call for help? Call 911 anytime you think you may need emergency care. For example, call if:  ? · You feel you cannot stop from hurting yourself or someone else. ? Keep the numbers for these national suicide hotlines: 3-665-287-TALK (3-423.766.1432) and 0-239-IKLIAJZ (8-965.193.9518). If you or someone you know talks about suicide or feeling hopeless, get help right away. ? Watch closely for changes in your health, and be sure to contact your doctor if:  ? · You have anxiety or fear that affects your life. ? · You have symptoms of anxiety that are new or different from those you had before. Where can you learn more? Go to http://dariusz-stanley.info/. Enter P754 in the search box to learn more about \"Anxiety Disorder: Care Instructions. \"  Current as of: May 12, 2017  Content Version: 11.4  © 8110-8512 [employer]. Care instructions adapted under license by [employer] (which disclaims liability or warranty for this information). If you have questions about a medical condition or this instruction, always ask your healthcare professional. [address] any warranty or liability for your use of this information.

## 2018-04-20 ENCOUNTER — TELEPHONE (OUTPATIENT)
Dept: FAMILY MEDICINE CLINIC | Age: 73
End: 2018-04-20

## 2018-04-20 ENCOUNTER — PATIENT MESSAGE (OUTPATIENT)
Dept: BEHAVIORAL/MENTAL HEALTH CLINIC | Age: 73
End: 2018-04-20

## 2018-04-20 DIAGNOSIS — F41.1 GAD (GENERALIZED ANXIETY DISORDER): ICD-10-CM

## 2018-04-20 RX ORDER — ALPRAZOLAM 0.5 MG/1
0.5 TABLET ORAL
Qty: 90 TAB | Refills: 0 | Status: SHIPPED | OUTPATIENT
Start: 2018-06-19 | End: 2018-07-05 | Stop reason: SDUPTHER

## 2018-04-20 NOTE — TELEPHONE ENCOUNTER
Pharmacy called and informed of alprazolam 0.5 mg TID July 19, error, anticipated June 19. Per pharmacist unable to alter the date. LPN please fax to pharmcy on file and then call patient and inform rx for 6/19/2018 faxed to pharmacy on file. Fredi Solo M.D.   [employer]  [address] [place] [address], [address] [place] [address] [phone]  [place] [phone]

## 2018-04-23 ENCOUNTER — TELEPHONE (OUTPATIENT)
Dept: FAMILY MEDICINE CLINIC | Age: 73
End: 2018-04-23

## 2018-04-23 NOTE — TELEPHONE ENCOUNTER
Called and discussed concenrs. Encouraged 1/2 tab xanax 0.5 mg TID PRN. Discussed need to schedule therapy. Discussed writers transition from psychiatry and encouraged to discuss with [address] if needs referral or can find new psychiatrist on his own. Will follow up in July for last visit, sooner if needed. Channing Huggins M.D.   [address], [address] [place] [address], [address] [place] [address] [phone]  O - [phone]

## 2018-04-23 NOTE — TELEPHONE ENCOUNTER
Called to discuss patient concerns. Patient reports he would like apologize for behavior during visit. With xanax 0.5 mg it's a \"little rough\" helps with anxiety, not helping with depression, making worse. Bad weekend. Encouraged to take 1/2 tab of 0.5 mg xanax. CALL BELOW TO SCHEDULE TALK THERAPY/COUNSELING:   Karis Velasquez.  Dr. Kameron Hollingsworth, [address] [place] [address]. [address]  [place], [address] [place] [address],  [address]  Phone: [phone]    All questions answered. Graciela Aguirre M.D.   [facility]  [address] [facility], [address] [place] [address], [address] [place] [address] [phone]  [place] [phone]

## 2018-05-09 DIAGNOSIS — E78.5 HYPERLIPIDEMIA, UNSPECIFIED HYPERLIPIDEMIA TYPE: ICD-10-CM

## 2018-05-09 RX ORDER — SIMVASTATIN 40 MG/1
TABLET, FILM COATED ORAL
Qty: 90 TAB | Refills: 0 | Status: SHIPPED | OUTPATIENT
Start: 2018-05-09 | End: 2018-07-09 | Stop reason: SDUPTHER

## 2018-05-11 RX ORDER — GLIPIZIDE 10 MG/1
TABLET, FILM COATED, EXTENDED RELEASE ORAL
Qty: 90 TAB | Refills: 0 | Status: SHIPPED | OUTPATIENT
Start: 2018-05-11 | End: 2018-08-05 | Stop reason: SDUPTHER

## 2018-05-14 DIAGNOSIS — F41.1 GAD (GENERALIZED ANXIETY DISORDER): ICD-10-CM

## 2018-05-14 DIAGNOSIS — F33.1 MDD (MAJOR DEPRESSIVE DISORDER), RECURRENT EPISODE, MODERATE (HCC): ICD-10-CM

## 2018-05-14 DIAGNOSIS — F33.1 MDD (MAJOR DEPRESSIVE DISORDER), RECURRENT EPISODE, MODERATE (HCC): Primary | ICD-10-CM

## 2018-05-14 RX ORDER — MIRTAZAPINE 45 MG/1
TABLET, FILM COATED ORAL
Qty: 90 TAB | Refills: 2 | Status: SHIPPED | OUTPATIENT
Start: 2018-05-14 | End: 2018-05-15

## 2018-05-14 RX ORDER — MIRTAZAPINE 45 MG/1
45 TABLET, FILM COATED ORAL
Qty: 30 TAB | Refills: 2 | Status: SHIPPED | OUTPATIENT
Start: 2018-05-14 | End: 2018-05-14 | Stop reason: SDUPTHER

## 2018-05-15 RX ORDER — VENLAFAXINE HYDROCHLORIDE 150 MG/1
150 CAPSULE, EXTENDED RELEASE ORAL DAILY
Qty: 90 CAP | Refills: 1 | Status: SHIPPED | OUTPATIENT
Start: 2018-05-15 | End: 2018-07-05

## 2018-05-26 DIAGNOSIS — E11.9 CONTROLLED TYPE 2 DIABETES MELLITUS WITHOUT COMPLICATION, WITHOUT LONG-TERM CURRENT USE OF INSULIN (HCC): ICD-10-CM

## 2018-05-29 RX ORDER — METFORMIN HYDROCHLORIDE 500 MG/1
TABLET ORAL
Qty: 360 TAB | Refills: 0 | Status: SHIPPED | OUTPATIENT
Start: 2018-05-29 | End: 2018-08-30 | Stop reason: SDUPTHER

## 2018-06-04 RX ORDER — ATENOLOL 100 MG/1
TABLET ORAL
Qty: 90 TAB | Refills: 0 | Status: SHIPPED | OUTPATIENT
Start: 2018-06-04 | End: 2018-08-30 | Stop reason: SDUPTHER

## 2018-06-25 ENCOUNTER — OFFICE VISIT (OUTPATIENT)
Dept: FAMILY MEDICINE CLINIC | Age: 73
End: 2018-06-25

## 2018-06-25 VITALS
HEIGHT: 69 IN | TEMPERATURE: 98 F | RESPIRATION RATE: 15 BRPM | SYSTOLIC BLOOD PRESSURE: 140 MMHG | DIASTOLIC BLOOD PRESSURE: 86 MMHG | WEIGHT: 200 LBS | HEART RATE: 85 BPM | BODY MASS INDEX: 29.62 KG/M2 | OXYGEN SATURATION: 99 %

## 2018-06-25 DIAGNOSIS — L98.9 SKIN LESION: ICD-10-CM

## 2018-06-25 DIAGNOSIS — J01.20 SUBACUTE ETHMOIDAL SINUSITIS: Primary | ICD-10-CM

## 2018-06-25 RX ORDER — AZITHROMYCIN 250 MG/1
TABLET, FILM COATED ORAL
Qty: 6 TAB | Refills: 0 | Status: SHIPPED | OUTPATIENT
Start: 2018-06-25 | End: 2018-06-30

## 2018-06-25 NOTE — PROGRESS NOTES
Chief Complaint   Patient presents with    Cough    Nasal Congestion    Chest Congestion    Ear Fullness     1. Have you been to the ER, urgent care clinic since your last visit? Hospitalized since your last visit? No    2. Have you seen or consulted any other health care providers outside of the [address] [place] [address] since your last visit? Include any pap smears or colon screening.  No     Has taken georgia seltzer OTC

## 2018-06-25 NOTE — PROGRESS NOTES
Gayle Mcnally is a 72 y.o. male  presents for cold sxs. Has had sxs for about 5 days. Has assoc cough congestion. Has tried otc meds but it has not helped. sxs are getting worse. Damon Younger No Known Allergies  Outpatient Prescriptions Marked as Taking for the 6/25/18 encounter (Office Visit) with Leonie Santana MD   Medication Sig Dispense Refill    atenolol (TENORMIN) 100 mg tablet TAKE 1 TABLET BY MOUTH EVERY DAY. 90 Tab 0    venlafaxine-SR (EFFEXOR-XR) 150 mg capsule Take 1 Cap by mouth daily. 90 Cap 1    glipiZIDE SR (GLUCOTROL XL) 10 mg CR tablet TAKE 1 TABLET BY MOUTH EVERY DAY 90 Tab 0    simvastatin (ZOCOR) 40 mg tablet TAKE 1 TABLET BY MOUTH EVERY NIGHT 90 Tab 0    ALPRAZolam (XANAX) 0.5 mg tablet Take 1 Tab by mouth three (3) times daily as needed for Anxiety for up to 30 days. Max Daily Amount: 1.5 mg. 90 Tab 0    [START ON 7/19/2018] ALPRAZolam (XANAX) 0.5 mg tablet Take 1 Tab by mouth three (3) times daily as needed for Anxiety. Max Daily Amount: 1.5 mg. 90 Tab 0    benazepril (LOTENSIN) 40 mg tablet TK 1 T PO QD. 90 Tab 1    fenofibrate (LOFIBRA) 160 mg tablet TAKE 1 TABLET BY MOUTH EVERY DAY WITH A MEAL 90 Tab 5    simvastatin (ZOCOR) 40 mg tablet Take 1 Tab by mouth nightly. 90 Tab 0    metFORMIN (GLUCOPHAGE) 500 mg tablet TAKE 2 TABLETS BY MOUTH TWICE DAILY WITH MEAL 360 Tab 0    aspirin delayed-release 81 mg tablet Take  by mouth daily.        Patient Active Problem List   Diagnosis Code    Essential hypertension I10    Controlled type 2 diabetes mellitus without complication, without long-term current use of insulin (HCC) E11.9    Hyperlipidemia E78.5    Chronic left shoulder pain M25.512, G89.29    Pain management contract agreement Z02.89    ACP (advance care planning) Z71.89    Type 2 diabetes mellitus with nephropathy (HCC) E11.21    Recurrent depression ([facility] [address] F33.9    TACHO (generalized anxiety disorder) F41.1    MDD (major depressive disorder), recurrent episode, moderate ([facility] F33.1    Palpitations R00.2     Past Medical History:   Diagnosis Date    Depression     Diabetes ([facility]     Hypercholesterolemia     Hypertension      Social History     Social History    Marital status:      Spouse name: N/A    Number of children: N/A    Years of education: N/A     Social History Main Topics    Smoking status: Current Every Day Smoker     Packs/day: 0.50    Smokeless tobacco: Never Used    Alcohol use No    Drug use: None    Sexual activity: Not Asked     Other Topics Concern    None     Social History Narrative     Family History   Problem Relation Age of Onset    Emphysema Mother     Hypertension Father     Heart Attack Father     Emphysema Paternal Grandmother         Review of Systems   Constitutional: Negative for chills and fever. HENT: Positive for congestion. Respiratory: Positive for cough and shortness of breath. Negative for wheezing. Cardiovascular: Negative for chest pain and palpitations. Vitals:    06/25/18 1349   BP: 140/86   Pulse: 85   Resp: 15   Temp: 98 °F (36.7 °C)   SpO2: 99%   Weight: 200 lb (90.7 kg)   Height: 5' 8.5\" (1.74 m)   PainSc:   0 - No pain       Physical Exam   Constitutional: He is oriented to person, place, and time and well-developed, well-nourished, and in no distress. Eyes: Conjunctivae are normal. Pupils are equal, round, and reactive to light. Neck: Normal range of motion. Neck supple. Cardiovascular: Normal rate, regular rhythm and normal heart sounds. Pulmonary/Chest: Effort normal and breath sounds normal.   Neurological: He is alert and oriented to person, place, and time. Gait normal.   Skin: Skin is warm and dry. No erythema. Nursing note and vitals reviewed. lesion on left ear and scalp. Assessment/Plan      ICD-10-CM ICD-9-CM    1. Subacute ethmoidal sinusitis J01.20 461.2 azithromycin (ZITHROMAX) 250 mg tablet   2.  Skin lesion L98.9 709.9 Will follow up with derm already has referral I have discussed the diagnosis with the patient and the intended plan of care as seen in the above orders. The patient has received an after-visit summary and questions were answered concerning future plans. I have discussed medication, side effects, and warnings with the patient in detail. The patient verbalized understanding and is in agreement with the plan of care. The patient will contact the office with any additional concerns.       Follow-up Disposition:  Return if symptoms worsen or fail to improve.  lab results and schedule of future lab studies reviewed with patient    Lesly Valenzuela MD

## 2018-06-25 NOTE — PATIENT INSTRUCTIONS
Sinusitis: Care Instructions  Your Care Instructions    Sinusitis is an infection of the lining of the sinus cavities in your head. Sinusitis often follows a cold. It causes pain and pressure in your head and face. In most cases, sinusitis gets better on its own in 1 to 2 weeks. But some mild symptoms may last for several weeks. Sometimes antibiotics are needed. Follow-up care is a key part of your treatment and safety. Be sure to make and go to all appointments, and call your doctor if you are having problems. It's also a good idea to know your test results and keep a list of the medicines you take. How can you care for yourself at home? · Take an over-the-counter pain medicine, such as acetaminophen (Tylenol), ibuprofen (Advil, Motrin), or naproxen (Aleve). Read and follow all instructions on the label. · If the doctor prescribed antibiotics, take them as directed. Do not stop taking them just because you feel better. You need to take the full course of antibiotics. · Be careful when taking over-the-counter cold or flu medicines and Tylenol at the same time. Many of these medicines have acetaminophen, which is Tylenol. Read the labels to make sure that you are not taking more than the recommended dose. Too much acetaminophen (Tylenol) can be harmful. · Breathe warm, moist air from a steamy shower, a hot bath, or a sink filled with hot water. Avoid cold, dry air. Using a humidifier in your home may help. Follow the directions for cleaning the machine. · Use saline (saltwater) nasal washes to help keep your nasal passages open and wash out mucus and bacteria. You can buy saline nose drops at a grocery store or drugstore. Or you can make your own at home by adding 1 teaspoon of salt and 1 teaspoon of baking soda to 2 cups of distilled water. If you make your own, fill a bulb syringe with the solution, insert the tip into your nostril, and squeeze gently. Heena Rummage your nose.   · Put a hot, wet towel or a warm gel pack on your face 3 or 4 times a day for 5 to 10 minutes each time. · Try a decongestant nasal spray like oxymetazoline (Afrin). Do not use it for more than 3 days in a row. Using it for more than 3 days can make your congestion worse. When should you call for help? Call your doctor now or seek immediate medical care if:  ? · You have new or worse swelling or redness in your face or around your eyes. ? · You have a new or higher fever. ? Watch closely for changes in your health, and be sure to contact your doctor if:  ? · You have new or worse facial pain. ? · The mucus from your nose becomes thicker (like pus) or has new blood in it. ? · You are not getting better as expected. Where can you learn more? Go to http://dariusz-stanley.info/. Enter E556 in the search box to learn more about \"Sinusitis: Care Instructions. \"  Current as of: May 12, 2017  Content Version: 11.4  © 0311-9021 [employer]. Care instructions adapted under license by [employer] (which disclaims liability or warranty for this information). If you have questions about a medical condition or this instruction, always ask your healthcare professional. [address] any warranty or liability for your use of this information.

## 2018-06-28 DIAGNOSIS — R06.02 SOB (SHORTNESS OF BREATH): Primary | ICD-10-CM

## 2018-06-28 RX ORDER — ALBUTEROL SULFATE 90 UG/1
2 AEROSOL, METERED RESPIRATORY (INHALATION)
Qty: 1 INHALER | Refills: 1 | Status: SHIPPED | OUTPATIENT
Start: 2018-06-28 | End: 2020-03-16 | Stop reason: SDUPTHER

## 2018-07-05 ENCOUNTER — OFFICE VISIT (OUTPATIENT)
Dept: BEHAVIORAL/MENTAL HEALTH CLINIC | Age: 73
End: 2018-07-05

## 2018-07-05 VITALS
HEIGHT: 69 IN | HEART RATE: 78 BPM | WEIGHT: 198 LBS | SYSTOLIC BLOOD PRESSURE: 138 MMHG | OXYGEN SATURATION: 99 % | TEMPERATURE: 95.8 F | BODY MASS INDEX: 29.33 KG/M2 | RESPIRATION RATE: 18 BRPM | DIASTOLIC BLOOD PRESSURE: 77 MMHG

## 2018-07-05 DIAGNOSIS — F41.1 GAD (GENERALIZED ANXIETY DISORDER): Primary | ICD-10-CM

## 2018-07-05 DIAGNOSIS — F33.0 MDD (MAJOR DEPRESSIVE DISORDER), RECURRENT EPISODE, MILD (HCC): ICD-10-CM

## 2018-07-05 RX ORDER — ALPRAZOLAM 0.5 MG/1
0.5 TABLET ORAL
Qty: 90 TAB | Refills: 0 | Status: SHIPPED | OUTPATIENT
Start: 2018-08-18 | End: 2018-07-10 | Stop reason: SDUPTHER

## 2018-07-05 RX ORDER — ALPRAZOLAM 0.5 MG/1
0.5 TABLET ORAL
Qty: 90 TAB | Refills: 0 | Status: SHIPPED | OUTPATIENT
Start: 2018-07-18 | End: 2018-08-17

## 2018-07-05 RX ORDER — MIRTAZAPINE 30 MG/1
TABLET, FILM COATED ORAL
Refills: 1 | COMMUNITY
Start: 2018-06-20 | End: 2018-10-22 | Stop reason: ALTCHOICE

## 2018-07-05 RX ORDER — ALPRAZOLAM 0.5 MG/1
0.5 TABLET ORAL
Qty: 90 TAB | Refills: 0 | Status: SHIPPED | OUTPATIENT
Start: 2018-09-17 | End: 2018-07-10 | Stop reason: SDUPTHER

## 2018-07-05 RX ORDER — ALPRAZOLAM 0.5 MG/1
0.5 TABLET ORAL
Qty: 90 TAB | Refills: 0 | Status: SHIPPED | OUTPATIENT
Start: 2018-10-17 | End: 2018-11-08

## 2018-07-05 NOTE — PROGRESS NOTES
Adult Psychiatry Progress Note    Assessment, and Plan:      ASSESSMENT:   Mr. Roberta Velazquez is a 73 y/o male sx consistent with MDD recurrent mild and TACHO here for med mgmt follow up. Since last visit improved depressive sx. Tolerating medications well without significant adverse SE. Will continue xanax 0.5 mg TID PRN and mirtazapine 30 mg nightly, given refills to last 4 months. Discussed r/b/se of medications. Recommended discussing sleep study referral with PCP to r/o XIMENA given high risk based on HPI assessment at initial visit. Strongly encouraged talk therpay to assist with treatment of anxiety and improve coping skills, re-assess readiness for benzo taper in future. Recommended increase social supports, healthy diet and exercise. Discussed writers transition out of psychiatry practice and need to refer to outside psychiatrist for continued care. Referral order placed for psychiatry. Pt encouraged to call office in 4 weeks if no word regarding referral to new psychiatric provider. Pt voiced understanding of plan. PLAN:   Problem Diagnosis: MDD recurrent mild, TACHO  Prognosis: fair  Goals: decrease sx to improve social functioning  Biologic: SLEEP STUDY REFERRAL (DEFER TO PCP),  Medication: remeron to 30 mg nightly, alprazolam 0.5 mg TID prn  Psychosocial: increase social supports, support group  Therapy: referral placed again  Behavioral: diet, exercise, meditation  Follow up: N/A    Assessment and plan outlined above discussed with patient who voiced understanding and agreement. Author: Marquis Eri MD as of: 7/5/2018 at 1:50 PM.    Note follows below:  Patient Identifying Information     Roberta Velazquez is a 72 y.o. male presenting with MDD moderate and TACHO. Patient came to psychiatry on a voluntarily basis. Chief Complaint:  Anxiety and Depression      Stated Chief Complaint: med follow up    History of Presenting Illness:      Last seen in clinic 4/19/2018.  At which time encouraged to d/c clonazepam and restart xanax at 1 mg TID. Since then received email/telephone call reporting tireness increased with 1 mg dose and encouraged to cut back to 0.5 mg TID prn and continue remeron. Since then reports feeling \"good\", depressive sx are \"better\", anxiety sx are \"better. \" Taking xanax 0.5 mg TID as needed for anxiety, no tiredness associated, feeling better with this. Mirtazapine makes feel like zombie \"most of the time\" taking 30 mg at night, less tired with 30 mg, able to function. Has not scheduled talk therapy appointment on account of \"distance too far. \" Needs referral closer to home, living in [place] [id number]. Has not talked to insurance company to determine if needs referral for psychiatrist. Has not tried meditation or looked for a support group. Has not talk to PCP about sleep study because reports \"more pressing issues recently. \"     Therapy: not currently. Violence History/Risk:      History of violence: No  Current access to firearm: No  Recent or current violent ideation: No     Suicidal Ideation and Behavior History/Risk:      Current suicidal thoughts: No  Previous suicide attempts: No  Previous self-injurious behavior/risky behavior: No  Previous suicidal ideation: passive SI prior to last visit  Access to stockpile pills: No  Impulsivity: No     Substance Abuse History   Recreational Drugs: No  Use of Alcohol: used to drink a lot of beer self medicating, stopped 2010  Tobacco Use: 1/2 ppd  Abuse of medications: No h/o  Legal Consequences of chemical use: No       Past Psychiatric and Medical History, Social History, Past Surgical History and Family History: reviewed and updated in EMR as appropriate. Medications: Reviewed and updated in EMR as appropriate. Allergies: Reviewed and updated in EMR as appropriate.      Current Outpatient Prescriptions   Medication Sig    albuterol (PROVENTIL HFA, VENTOLIN HFA, PROAIR HFA) 90 mcg/actuation inhaler Take 2 Puffs by inhalation every four (4) hours as needed for Wheezing.  atenolol (TENORMIN) 100 mg tablet TAKE 1 TABLET BY MOUTH EVERY DAY.  metFORMIN (GLUCOPHAGE) 500 mg tablet TAKE 2 TABLETS BY MOUTH TWICE DAILY WITH MEALS    venlafaxine-SR (EFFEXOR-XR) 150 mg capsule Take 1 Cap by mouth daily.  glipiZIDE SR (GLUCOTROL XL) 10 mg CR tablet TAKE 1 TABLET BY MOUTH EVERY DAY    simvastatin (ZOCOR) 40 mg tablet TAKE 1 TABLET BY MOUTH EVERY NIGHT    ALPRAZolam (XANAX) 0.5 mg tablet Take 1 Tab by mouth three (3) times daily as needed for Anxiety for up to 30 days. Max Daily Amount: 1.5 mg.    [START ON 7/19/2018] ALPRAZolam (XANAX) 0.5 mg tablet Take 1 Tab by mouth three (3) times daily as needed for Anxiety. Max Daily Amount: 1.5 mg.    benazepril (LOTENSIN) 40 mg tablet TK 1 T PO QD.    fenofibrate (LOFIBRA) 160 mg tablet TAKE 1 TABLET BY MOUTH EVERY DAY WITH A MEAL    simvastatin (ZOCOR) 40 mg tablet Take 1 Tab by mouth nightly.  metFORMIN (GLUCOPHAGE) 500 mg tablet TAKE 2 TABLETS BY MOUTH TWICE DAILY WITH MEAL    aspirin delayed-release 81 mg tablet Take  by mouth daily. No current facility-administered medications for this visit.           Objective/Exam:       Visit Vitals    /77    Pulse 78    Temp 95.8 °F (35.4 °C) (Oral)    Resp 18    Ht 5' 8.5\" (1.74 m)    Wt 198 lb (89.8 kg)    SpO2 99%    BMI 29.67 kg/m2       General: sitting comfortably in no acute distress     Mental Status Evaluation:      Appearance Elderly male dressed casually, good GH, good EC   Attitude Cooperative,    Behavior:  No PMA/R   Speech:  Normal, spontaenous   Mood:  Appears irritable    Affect:  Congruent and reactive   Thought Process:  Linear, goal directed   Thought Content:  As per [facility]   Fund of Knowledge Good   SI/HI/Psychosis Denies   Sensorium:  Alert   Cognition:  Grossly intact, not formally assessed   Insight:  fair   Judgment: fair                                       Impulse Control:  good         PHQ-9: 9, Somewhat difficult  TACHO-7: 14, somewhat difficult    4/19/2018 PHQ-9: 14, very difficult  4/19/2018 TACHO-7: 15, very difficult     3/20/2018 PHQ-9: 9, not difficult at all  3/20/2018 TACHO-7: 8, somewhat difficult     2/20/2018 PHQ-9: 25, very difficult to extremely difficult  2/20/2018 TACHO-7: 20, very difficult to extremely difficult       Pertinent Labs and Studies   N/A     Acute risk for:    Danger to self:  LOW  Danger to others: LOW  Grave disability: LOW     Diagnosis:        Axis I: MDD recurrent moderate TACHO     Medication Reconciliation:      Medication reconciliation completed? YES  Includes discharge medication reconciliation? YES  PMPx reviewed, no aberrances noted       More than 50% of this 25 min visit was spent face to face counseling the patient about the etiology and treatment options for the above health conditions outlined in assessment and plan      Kenna Aldana M.D.   [address], [address] [place] [address] [id number] [place] [address] [phone]

## 2018-07-05 NOTE — MR AVS SNAPSHOT
[address] [place] [address] [address] [address] 
[phone] Patient: Howard Campo MRN: [id number] ZQS:[id number] Visit Information Date & Time Provider Department Dept. Phone Encounter #  
 7/5/2018  2:00 PM mAber Garcia MD [facility] [address] [id number] Upcoming Health Maintenance Date Due  
 FOOT EXAM Q1 1/3/1955 EYE EXAM RETINAL OR DILATED Q1 1/3/1957 DTaP/Tdap/Td series (1 - Tdap) 1/3/1968 FOBT Q 1 YEAR AGE 50-75 1/3/1997 ZOSTER VACCINE AGE 60> 11/3/2006 GLAUCOMA SCREENING Q2Y 1/3/2012 Pneumococcal 65+ Low/Medium Risk (1 of 2 - PCV13) 1/3/2012 HEMOGLOBIN A1C Q6M 7/11/2018 Influenza Age 5 to Adult 8/1/2018 MICROALBUMIN Q1 10/11/2018 LIPID PANEL Q1 10/11/2018 MEDICARE YEARLY EXAM 10/12/2018 Allergies as of 7/5/2018  Review Complete On: 7/5/2018 By: Amber Garcia MD  
 No Known Allergies Current Immunizations  Never Reviewed Name Date Influenza High Dose Vaccine PF 10/11/2017 Not reviewed this visit You Were Diagnosed With   
  
 Codes Comments TACHO (generalized anxiety disorder)    -  Primary ICD-10-CM: F41.1 ICD-9-CM: 300.02   
 MDD (major depressive disorder), recurrent episode, mild (HCC)     ICD-10-CM: F33.0 ICD-9-CM: 296.31 Vitals BP Pulse Temp Resp Height(growth percentile) Weight(growth percentile) 138/77 78 95.8 °F (35.4 °C) (Oral) 18 5' 8.5\" (1.74 m) 198 lb (89.8 kg) SpO2 BMI Smoking Status 99% 29.67 kg/m2 Current Every Day Smoker BMI and BSA Data Body Mass Index Body Surface Area  
 29.67 kg/m 2 2.08 m 2 Preferred Pharmacy Pharmacy Name Phone [phone] - [place], [address] [phone] Your Updated Medication List  
  
   
This list is accurate as of 7/5/18  2:59 PM.  Always use your most recent med list.  
  
  
  
  
 albuterol 90 mcg/actuation inhaler Commonly known as:  PROVENTIL HFA, VENTOLIN HFA, PROAIR HFA Take 2 Puffs by inhalation every four (4) hours as needed for Wheezing. * ALPRAZolam 0.5 mg tablet Commonly known as:  Jazmyn Sybil Take 1 Tab by mouth three (3) times daily as needed for Anxiety for up to 30 days. Max 1.5 mg daily Start taking on:  7/18/2018 * ALPRAZolam 0.5 mg tablet Commonly known as:  Jazmyn Sybil Take 1 Tab by mouth three (3) times daily as needed for Anxiety for up to 30 days. Max Daily Amount: 1.5 mg.  
Start taking on:  8/18/2018 * ALPRAZolam 0.5 mg tablet Commonly known as:  Wheatland Sybil Take 1 Tab by mouth three (3) times daily as needed for Anxiety for up to 30 days. Max Daily Amount: 1.5 mg.  
Start taking on:  9/17/2018 * ALPRAZolam 0.5 mg tablet Commonly known as:  [place] Sybil Take 1 Tab by mouth three (3) times daily as needed for Anxiety for up to 30 days. Max Daily Amount: 1.5 mg.  
Start taking on:  10/17/2018  
  
 aspirin delayed-release 81 mg tablet Take  by mouth daily. atenolol 100 mg tablet Commonly known as:  TENORMIN  
TAKE 1 TABLET BY MOUTH EVERY DAY. benazepril 40 mg tablet Commonly known as:  LOTENSIN  
TK 1 T PO QD.  
  
 fenofibrate 160 mg tablet Commonly known as:  LOFIBRA TAKE 1 TABLET BY MOUTH EVERY DAY WITH A MEAL  
  
 glipiZIDE SR 10 mg CR tablet Commonly known as:  GLUCOTROL XL  
TAKE 1 TABLET BY MOUTH EVERY DAY  
  
 * metFORMIN 500 mg tablet Commonly known as:  GLUCOPHAGE  
TAKE 2 TABLETS BY MOUTH TWICE DAILY WITH MEAL  
  
 * metFORMIN 500 mg tablet Commonly known as:  GLUCOPHAGE  
TAKE 2 TABLETS BY MOUTH TWICE DAILY WITH MEALS  
  
 mirtazapine 30 mg tablet Commonly known as:  REMERON  
TK 1 T PO Q NIGHT * simvastatin 40 mg tablet Commonly known as:  ZOCOR Take 1 Tab by mouth nightly. * simvastatin 40 mg tablet Commonly known as:  ZOCOR  
TAKE 1 TABLET BY MOUTH EVERY NIGHT * Notice: This list has 8 medication(s) that are the same as other medications prescribed for you. Read the directions carefully, and ask your doctor or other care provider to review them with you. Prescriptions Printed Refills ALPRAZolam (XANAX) 0.5 mg tablet 0 Starting on: 10/17/2018 Sig: Take 1 Tab by mouth three (3) times daily as needed for Anxiety for up to 30 days. Max Daily Amount: 1.5 mg.  
 Class: Print Route: Oral  
 ALPRAZolam (XANAX) 0.5 mg tablet 0 Starting on: 9/17/2018 Sig: Take 1 Tab by mouth three (3) times daily as needed for Anxiety for up to 30 days. Max Daily Amount: 1.5 mg.  
 Class: Print Route: Oral  
 ALPRAZolam (XANAX) 0.5 mg tablet 0 Starting on: 8/18/2018 Sig: Take 1 Tab by mouth three (3) times daily as needed for Anxiety for up to 30 days. Max Daily Amount: 1.5 mg.  
 Class: Print Route: Oral  
 ALPRAZolam (XANAX) 0.5 mg tablet 0 Starting on: 7/18/2018 Sig: Take 1 Tab by mouth three (3) times daily as needed for Anxiety for up to 30 days. Max 1.5 mg daily Class: Print Route: Oral  
  
We Performed the Following REFERRAL TO PSYCHIATRY [REF91 Custom] Comments:  
 Pt with h/o MDD recurrent and TACHO in need of med mgmt and talk therapy, [facility], [place] [facility]. Referral Information Referral ID Referred By Referred To  
  
 [id number] [facility] Not Available Visits Status Start Date End Date 1 New Request 7/5/18 7/5/19 If your referral has a status of pending review or denied, additional information will be sent to support the outcome of this decision. Patient Instructions We are sending a referral to [address] [place] [address] . You should be called about this in 3-4 weeks. If no word in 4 weeks please give us a call to follow up MEDICATIONS: 
CONTINUE REMERON 30 MG NIGHTLY CONTINUE XANAX 0.5 MG THREE TIMES A DAY AS NEEDED 
 DO NOT MIX XANAX WITH ALCOHOL OR OTHER SEDATING MEDICATIONS AS CAN INCREASE RISK OF TIREDNESS AND ACCIDENTAL OVERDOSE OR EVEN DEATH 
 
LIFESTYLE CHANGES:  
START EATING TWO TO THREE MEALS A DAY STARTING WITH BREAKFAST 
START DRINKING 2 LITERS OF WATER PER DAY (65 OUNCES) EXERCISE 30 MINUTES A DAY, STAIRS ARE OKAY WORK ON MEDITATION OUTLINED BELOW INCREASE SOCIAL SUPPORTS, CONSIDER JOINING SUPPORT GROUP 
TALK MORE TO YOUR SON ABOUT YOUR MOOD/STRESS/ANXIETY SYMPTOMS AND LEVEL OF DISTRESS 
FOR SLEEP TALK TO PCP DR. CARR ABOUT GETTING A SLEEP STUDY 
 
HOW TO MEDITATE: SIMPLE MEDITATION FOR BEGINNERS This meditation exercise is an excellent introduction to meditation techniques. Sit comfortably. You may even want to invest in a meditation chair. Close your eyes. Make no effort to control the breath; simply breathe naturally. Focus your attention on the breath  (think \"im breathing in\" when breathing in, think \"im breathing out\" when breathing out) and on how the body moves with each inhalation and exhalation. Notice the movement of your body as you breathe. Observe your chest, shoulders, rib cage, and belly. Simply focus your attention on your breath without controlling its pace or intensity. If your mind wanders, return your focus back to your breath. Focus on breathing in as you take in a breath and focus on breathing out as you exhale Maintain this meditation practice for two to three minutes to start, and then try it for longer periods gradually extended to 5 to 10 minutes daily. Anxiety Disorder: Care Instructions Your Care Instructions Anxiety is a normal reaction to stress. Difficult situations can cause you to have symptoms such as sweaty palms and a nervous feeling. In an anxiety disorder, the symptoms are far more severe. Constant worry, muscle tension, trouble sleeping, nausea and diarrhea, and other symptoms can make normal daily activities difficult or impossible.  These symptoms may occur for no reason, and they can affect your work, school, or social life. Medicines, counseling, and self-care can all help. Follow-up care is a key part of your treatment and safety. Be sure to make and go to all appointments, and call your doctor if you are having problems. It's also a good idea to know your test results and keep a list of the medicines you take. How can you care for yourself at home? · Take medicines exactly as directed. Call your doctor if you think you are having a problem with your medicine. · Go to your counseling sessions and follow-up appointments. · Recognize and accept your anxiety. Then, when you are in a situation that makes you anxious, say to yourself, \"This is not an emergency. I feel uncomfortable, but I am not in danger. I can keep going even if I feel anxious. \" · Be kind to your body: ¨ Relieve tension with exercise or a massage. ¨ Get enough rest. 
¨ Avoid alcohol, caffeine, nicotine, and illegal drugs. They can increase your anxiety level and cause sleep problems. ¨ Learn and do relaxation techniques. See below for more about these techniques. · Engage your mind. Get out and do something you enjoy. Go to a funny movie, or take a walk or hike. Plan your day. Having too much or too little to do can make you anxious. · Keep a record of your symptoms. Discuss your fears with a good friend or family member, or join a support group for people with similar problems. Talking to others sometimes relieves stress. · Get involved in social groups, or volunteer to help others. Being alone sometimes makes things seem worse than they are. · Get at least 30 minutes of exercise on most days of the week to relieve stress. Walking is a good choice. You also may want to do other activities, such as running, swimming, cycling, or playing tennis or team sports. Relaxation techniques Do relaxation exercises 10 to 20 minutes a day.  You can play soothing, relaxing music while you do them, if you wish. · Tell others in your house that you are going to do your relaxation exercises. Ask them not to disturb you. · Find a comfortable place, away from all distractions and noise. · Lie down on your back, or sit with your back straight. · Focus on your breathing. Make it slow and steady. · Breathe in through your nose. Breathe out through either your nose or mouth. · Breathe deeply, filling up the area between your navel and your rib cage. Breathe so that your belly goes up and down. · Do not hold your breath. · Breathe like this for 5 to 10 minutes. Notice the feeling of calmness throughout your whole body. As you continue to breathe slowly and deeply, relax by doing the following for another 5 to 10 minutes: · Tighten and relax each muscle group in your body. You can begin at your toes and work your way up to your head. · Imagine your muscle groups relaxing and becoming heavy. · Empty your mind of all thoughts. · Let yourself relax more and more deeply. · Become aware of the state of calmness that surrounds you. · When your relaxation time is over, you can bring yourself back to alertness by moving your fingers and toes and then your hands and feet and then stretching and moving your entire body. Sometimes people fall asleep during relaxation, but they usually wake up shortly afterward. · Always give yourself time to return to full alertness before you drive a car or do anything that might cause an accident if you are not fully alert. Never play a relaxation tape while you drive a car. When should you call for help? Call 911 anytime you think you may need emergency care. For example, call if: 
? · You feel you cannot stop from hurting yourself or someone else. ? Keep the numbers for these national suicide hotlines: 7-143-272-TALK (9-425.255.6238) and 5-014-GIAAFRH (3-445.850.4117).  If you or someone you know talks about suicide or feeling hopeless, get help right away. ? Watch closely for changes in your health, and be sure to contact your doctor if: 
? · You have anxiety or fear that affects your life. ? · You have symptoms of anxiety that are new or different from those you had before. Where can you learn more? Go to http://dariusz-stanley.info/. Enter P754 in the search box to learn more about \"Anxiety Disorder: Care Instructions. \" Current as of: May 12, 2017 Content Version: 11.4 © 8272-6357 "[employer]. Care instructions adapted under license by [employer] (which disclaims liability or warranty for this information). If you have questions about a medical condition or this instruction, always ask your healthcare professional. [address] any warranty or liability for your use of this information. Introducing [facility]! Dear Chari Baldwin: Thank you for requesting a Vaprema account. Our records indicate that you already have an active Vaprema account. You can access your account anytime at [email or web address] Did you know that you can access your hospital and ER discharge instructions at any time in Vaprema? You can also review all of your test results from your hospital stay or ER visit. Additional Information If you have questions, please visit the Frequently Asked Questions section of the Vaprema website at [email or web address] Remember, Vaprema is NOT to be used for urgent needs. For medical emergencies, dial 911. Now available from your iPhone and Android! Please provide this summary of care documentation to your next provider. Your primary care clinician is listed as [address]. If you have any questions after today's visit, please call [phone].

## 2018-07-05 NOTE — PATIENT INSTRUCTIONS
We are sending a referral to [address] [place] [address] . You should be called about this in 3-4 weeks. If no word in 4 weeks please give us a call to follow up      MEDICATIONS:  CONTINUE REMERON 30 MG NIGHTLY  CONTINUE XANAX 0.5 MG THREE TIMES A DAY AS NEEDED  DO NOT MIX XANAX WITH ALCOHOL OR OTHER SEDATING MEDICATIONS AS CAN INCREASE RISK OF TIREDNESS AND ACCIDENTAL OVERDOSE OR EVEN DEATH    LIFESTYLE CHANGES:   START EATING TWO TO THREE MEALS A DAY STARTING WITH BREAKFAST  START DRINKING 2 LITERS OF WATER PER DAY (65 OUNCES)  EXERCISE 30 MINUTES A DAY, STAIRS ARE OKAY  WORK ON MEDITATION OUTLINED BELOW  INCREASE SOCIAL SUPPORTS, CONSIDER JOINING SUPPORT GROUP  TALK MORE TO YOUR SON ABOUT YOUR MOOD/STRESS/ANXIETY SYMPTOMS AND LEVEL OF DISTRESS  FOR SLEEP TALK TO PCP DR. CARR ABOUT GETTING A SLEEP STUDY    HOW TO MEDITATE: SIMPLE MEDITATION FOR BEGINNERS  This meditation exercise is an excellent introduction to meditation techniques. Sit comfortably. You may even want to invest in a meditation chair. Close your eyes. Make no effort to control the breath; simply breathe naturally. Focus your attention on the breath  (think \"im breathing in\" when breathing in, think \"im breathing out\" when breathing out) and on how the body moves with each inhalation and exhalation. Notice the movement of your body as you breathe. Observe your chest, shoulders, rib cage, and belly. Simply focus your attention on your breath without controlling its pace or intensity. If your mind wanders, return your focus back to your breath. Focus on breathing in as you take in a breath and focus on breathing out as you exhale  Maintain this meditation practice for two to three minutes to start, and then try it for longer periods gradually extended to 5 to 10 minutes daily. Anxiety Disorder: Care Instructions  Your Care Instructions    Anxiety is a normal reaction to stress.  Difficult situations can cause you to have symptoms such as sweaty palms and a nervous feeling. In an anxiety disorder, the symptoms are far more severe. Constant worry, muscle tension, trouble sleeping, nausea and diarrhea, and other symptoms can make normal daily activities difficult or impossible. These symptoms may occur for no reason, and they can affect your work, school, or social life. Medicines, counseling, and self-care can all help. Follow-up care is a key part of your treatment and safety. Be sure to make and go to all appointments, and call your doctor if you are having problems. It's also a good idea to know your test results and keep a list of the medicines you take. How can you care for yourself at home? · Take medicines exactly as directed. Call your doctor if you think you are having a problem with your medicine. · Go to your counseling sessions and follow-up appointments. · Recognize and accept your anxiety. Then, when you are in a situation that makes you anxious, say to yourself, \"This is not an emergency. I feel uncomfortable, but I am not in danger. I can keep going even if I feel anxious. \"  · Be kind to your body:  ¨ Relieve tension with exercise or a massage. ¨ Get enough rest.  ¨ Avoid alcohol, caffeine, nicotine, and illegal drugs. They can increase your anxiety level and cause sleep problems. ¨ Learn and do relaxation techniques. See below for more about these techniques. · Engage your mind. Get out and do something you enjoy. Go to a funny movie, or take a walk or hike. Plan your day. Having too much or too little to do can make you anxious. · Keep a record of your symptoms. Discuss your fears with a good friend or family member, or join a support group for people with similar problems. Talking to others sometimes relieves stress. · Get involved in social groups, or volunteer to help others. Being alone sometimes makes things seem worse than they are.   · Get at least 30 minutes of exercise on most days of the week to relieve stress. Walking is a good choice. You also may want to do other activities, such as running, swimming, cycling, or playing tennis or team sports. Relaxation techniques  Do relaxation exercises 10 to 20 minutes a day. You can play soothing, relaxing music while you do them, if you wish. · Tell others in your house that you are going to do your relaxation exercises. Ask them not to disturb you. · Find a comfortable place, away from all distractions and noise. · Lie down on your back, or sit with your back straight. · Focus on your breathing. Make it slow and steady. · Breathe in through your nose. Breathe out through either your nose or mouth. · Breathe deeply, filling up the area between your navel and your rib cage. Breathe so that your belly goes up and down. · Do not hold your breath. · Breathe like this for 5 to 10 minutes. Notice the feeling of calmness throughout your whole body. As you continue to breathe slowly and deeply, relax by doing the following for another 5 to 10 minutes:  · Tighten and relax each muscle group in your body. You can begin at your toes and work your way up to your head. · Imagine your muscle groups relaxing and becoming heavy. · Empty your mind of all thoughts. · Let yourself relax more and more deeply. · Become aware of the state of calmness that surrounds you. · When your relaxation time is over, you can bring yourself back to alertness by moving your fingers and toes and then your hands and feet and then stretching and moving your entire body. Sometimes people fall asleep during relaxation, but they usually wake up shortly afterward. · Always give yourself time to return to full alertness before you drive a car or do anything that might cause an accident if you are not fully alert. Never play a relaxation tape while you drive a car. When should you call for help? Call 911 anytime you think you may need emergency care.  For example, call if:  ? · You feel you cannot stop from hurting yourself or someone else. ? Keep the numbers for these national suicide hotlines: 1-781-634-TALK (6-522.570.1646) and 6-644-TNLCTEA (0-779.807.1981). If you or someone you know talks about suicide or feeling hopeless, get help right away. ? Watch closely for changes in your health, and be sure to contact your doctor if:  ? · You have anxiety or fear that affects your life. ? · You have symptoms of anxiety that are new or different from those you had before. Where can you learn more? Go to http://dariusz-stanley.info/. Enter P754 in the search box to learn more about \"Anxiety Disorder: Care Instructions. \"  Current as of: May 12, 2017  Content Version: 11.4  © 1202-3791 [employer]. Care instructions adapted under license by [employer] (which disclaims liability or warranty for this information). If you have questions about a medical condition or this instruction, always ask your healthcare professional. [address] any warranty or liability for your use of this information.

## 2018-07-09 DIAGNOSIS — I10 ESSENTIAL HYPERTENSION: ICD-10-CM

## 2018-07-09 DIAGNOSIS — E78.5 HYPERLIPIDEMIA, UNSPECIFIED HYPERLIPIDEMIA TYPE: ICD-10-CM

## 2018-07-09 RX ORDER — BENAZEPRIL HYDROCHLORIDE 40 MG/1
TABLET ORAL
Qty: 90 TAB | Refills: 0 | Status: SHIPPED | OUTPATIENT
Start: 2018-07-09 | End: 2018-10-07 | Stop reason: SDUPTHER

## 2018-07-09 RX ORDER — SIMVASTATIN 40 MG/1
TABLET, FILM COATED ORAL
Qty: 90 TAB | Refills: 0 | Status: SHIPPED | OUTPATIENT
Start: 2018-07-09 | End: 2018-10-22 | Stop reason: SDUPTHER

## 2018-07-10 ENCOUNTER — OFFICE VISIT (OUTPATIENT)
Dept: FAMILY MEDICINE CLINIC | Age: 73
End: 2018-07-10

## 2018-07-10 VITALS
RESPIRATION RATE: 14 BRPM | HEART RATE: 80 BPM | HEIGHT: 69 IN | BODY MASS INDEX: 29.62 KG/M2 | WEIGHT: 200 LBS | TEMPERATURE: 97.7 F | DIASTOLIC BLOOD PRESSURE: 80 MMHG | OXYGEN SATURATION: 97 % | SYSTOLIC BLOOD PRESSURE: 110 MMHG

## 2018-07-10 DIAGNOSIS — R06.02 SOB (SHORTNESS OF BREATH) ON EXERTION: Primary | ICD-10-CM

## 2018-07-10 NOTE — MR AVS SNAPSHOT
[facility] [address] [place] [address] Road 
[phone] Patient: Perry Laureano MRN: [id number] VDO:[id number]/[id number] Visit Information Date & Time Provider Department Dept. Phone Encounter #  
 7/10/2018  2:30 PM Joslyn Morrison MD [facility] [phone] [id number] Follow-up Instructions Return if symptoms worsen or fail to improve. Upcoming Health Maintenance Date Due  
 FOOT EXAM Q1 1/3/1955 EYE EXAM RETINAL OR DILATED Q1 1/3/1957 DTaP/Tdap/Td series (1 - Tdap) 1/3/1968 FOBT Q 1 YEAR AGE 50-75 1/3/1997 ZOSTER VACCINE AGE 60> 11/3/2006 GLAUCOMA SCREENING Q2Y 1/3/2012 Pneumococcal 65+ Low/Medium Risk (1 of 2 - PCV13) 1/3/2012 HEMOGLOBIN A1C Q6M 7/11/2018 Influenza Age 5 to Adult 8/1/2018 MICROALBUMIN Q1 10/11/2018 LIPID PANEL Q1 10/11/2018 MEDICARE YEARLY EXAM 10/12/2018 Allergies as of 7/10/2018  Review Complete On: 7/10/2018 By: Cristin Rahman No Known Allergies Current Immunizations  Never Reviewed Name Date Influenza High Dose Vaccine PF 10/11/2017 Not reviewed this visit You Were Diagnosed With   
  
 Codes Comments SOB (shortness of breath) on exertion    -  Primary ICD-10-CM: R06.02 
ICD-9-CM: 786.05 Vitals BP Pulse Temp Resp Height(growth percentile) Weight(growth percentile) 110/80 80 97.7 °F (36.5 °C) (Oral) 14 5' 8.5\" (1.74 m) 200 lb (90.7 kg) SpO2 BMI Smoking Status 97% 29.97 kg/m2 Current Every Day Smoker Vitals History BMI and BSA Data Body Mass Index Body Surface Area  
 29.97 kg/m 2 2.09 m 2 Preferred Pharmacy Pharmacy Name Phone [facility] [phone] - [place], [address] [facility] [address] AT [address] [phone] Your Updated Medication List  
  
   
This list is accurate as of 7/10/18  2:47 PM.  Always use your most recent med list.  
 albuterol 90 mcg/actuation inhaler Commonly known as:  PROVENTIL HFA, VENTOLIN HFA, PROAIR HFA Take 2 Puffs by inhalation every four (4) hours as needed for Wheezing. * ALPRAZolam 0.5 mg tablet Commonly known as:  [place]wn Take 1 Tab by mouth three (3) times daily as needed for Anxiety for up to 30 days. Max 1.5 mg daily Start taking on:  7/18/2018 * ALPRAZolam 0.5 mg tablet Commonly known as:  Rebbeca Lawn Take 1 Tab by mouth three (3) times daily as needed for Anxiety for up to 30 days. Max Daily Amount: 1.5 mg.  
Start taking on:  10/17/2018  
  
 aspirin delayed-release 81 mg tablet Take  by mouth daily. atenolol 100 mg tablet Commonly known as:  TENORMIN  
TAKE 1 TABLET BY MOUTH EVERY DAY. benazepril 40 mg tablet Commonly known as:  LOTENSIN  
TAKE 1 TABLET BY MOUTH EVERY DAY  
  
 fenofibrate 160 mg tablet Commonly known as:  LOFIBRA TAKE 1 TABLET BY MOUTH EVERY DAY WITH A MEAL  
  
 glipiZIDE SR 10 mg CR tablet Commonly known as:  GLUCOTROL XL  
TAKE 1 TABLET BY MOUTH EVERY DAY  
  
 metFORMIN 500 mg tablet Commonly known as:  GLUCOPHAGE  
TAKE 2 TABLETS BY MOUTH TWICE DAILY WITH MEALS  
  
 mirtazapine 30 mg tablet Commonly known as:  REMERON  
TK 1 T PO Q NIGHT * simvastatin 40 mg tablet Commonly known as:  ZOCOR Take 1 Tab by mouth nightly. * simvastatin 40 mg tablet Commonly known as:  ZOCOR  
TAKE 1 TABLET BY MOUTH EVERY NIGHT * Notice: This list has 4 medication(s) that are the same as other medications prescribed for you. Read the directions carefully, and ask your doctor or other care provider to review them with you. We Performed the Following REFERRAL TO PULMONARY DISEASE [SHY54 Custom] Comments:  
 Please evaluate patient for persistent SOB Follow-up Instructions Return if symptoms worsen or fail to improve. To-Do List   
 07/10/2018 Imaging:  XR CHEST PA LAT Referral Information Referral ID Referred By Referred To  
  
 [id number] Dashawn Chisholm MD   
   [address] [place] [address] [address], [address] Phone: [phone] Fax: [phone] Visits Status Start Date End Date 1 New Request 7/10/18 7/10/19 If your referral has a status of pending review or denied, additional information will be sent to support the outcome of this decision. Patient Instructions Shortness of Breath: Care Instructions Your Care Instructions Shortness of breath has many causes. Sometimes conditions such as anxiety can lead to shortness of breath. Some people get mild shortness of breath when they exercise. Trouble breathing also can be a symptom of a serious problem, such as asthma, lung disease, emphysema, heart problems, and pneumonia. If your shortness of breath continues, you may need tests and treatment. Watch for any changes in your breathing and other symptoms. Follow-up care is a key part of your treatment and safety. Be sure to make and go to all appointments, and call your doctor if you are having problems. It's also a good idea to know your test results and keep a list of the medicines you take. How can you care for yourself at home? · Do not smoke or allow others to smoke around you. If you need help quitting, talk to your doctor about stop-smoking programs and medicines. These can increase your chances of quitting for good. · Get plenty of rest and sleep. · Take your medicines exactly as prescribed. Call your doctor if you think you are having a problem with your medicine. · Find healthy ways to deal with stress. ¨ Exercise daily. ¨ Get plenty of sleep. ¨ Eat regularly and well. When should you call for help? Call 911 anytime you think you may need emergency care. For example, call if: 
  · You have severe shortness of breath.  
  · You have symptoms of a heart attack. These may include: ¨ Chest pain or pressure, or a strange feeling in the chest. 
¨ Sweating. ¨ Shortness of breath. ¨ Nausea or vomiting. ¨ Pain, pressure, or a strange feeling in the back, neck, jaw, or upper belly or in one or both shoulders or arms. ¨ Lightheadedness or sudden weakness. ¨ A fast or irregular heartbeat. After you call 911, the  may tell you to chew 1 adult-strength or 2 to 4 low-dose aspirin. Wait for an ambulance. Do not try to drive yourself.  
 Call your doctor now or seek immediate medical care if: 
  · Your shortness of breath gets worse or you start to wheeze. Wheezing is a high-pitched sound when you breathe.  
  · You wake up at night out of breath or have to prop your head up on several pillows to breathe.  
  · You are short of breath after only light activity or while at rest.  
 Watch closely for changes in your health, and be sure to contact your doctor if: 
  · You do not get better over the next 1 to 2 days. Where can you learn more? Go to http://dariusz-stanley.info/. Enter S780 in the search box to learn more about \"Shortness of Breath: Care Instructions. \" Current as of: December 6, 2017 Content Version: 11.7 © 9511-7737 [employer]. Care instructions adapted under license by [employer] (which disclaims liability or warranty for this information). If you have questions about a medical condition or this instruction, always ask your healthcare professional. [place] [address] any warranty or liability for your use of this information. Introducing [facility]! Dear Brent Purchase: Thank you for requesting a RideApart account. Our records indicate that you already have an active RideApart account. You can access your account anytime at [email or web address]. [email or web address] Did you know that you can access your hospital and ER discharge instructions at any time in RideApart?   You can also review all of your test results from your hospital stay or ER visit. Additional Information If you have questions, please visit the Frequently Asked Questions section of the [employer] website at [email or web address] Remember, [employer] is NOT to be used for urgent needs. For medical emergencies, dial 911. Now available from your iPhone and Android! Please provide this summary of care documentation to your next provider. Your primary care clinician is listed as [id number] [facility]. If you have any questions after today's visit, please call [phone].

## 2018-07-10 NOTE — PATIENT INSTRUCTIONS

## 2018-07-10 NOTE — PROGRESS NOTES
Patient c/o chest congestion x 2 weeks he states he was dx with sinusitis at his last visit. He states exertion causes him to feel SOB. He states he has a hard time climbing his stairs. 1. Have you been to the ER, urgent care clinic since your last visit? Hospitalized since your last visit? No  2. Have you seen or consulted any other health care providers outside of the [facility] since your last visit? Include any pap smears or colon screening. No  Health Maintenance reviewed - Patient asked to discuss at next visit. Candance Huger

## 2018-07-10 NOTE — PROGRESS NOTES
Hong Faith is a 72 y.o. male  presents for SOB for a few weeks. He was on antibiotics for sinusitis and it has helped. No chest pains. No Known Allergies  Outpatient Prescriptions Marked as Taking for the 7/10/18 encounter (Office Visit) with Maite Villanueva MD   Medication Sig Dispense Refill    simvastatin (ZOCOR) 40 mg tablet TAKE 1 TABLET BY MOUTH EVERY NIGHT 90 Tab 0    benazepril (LOTENSIN) 40 mg tablet TAKE 1 TABLET BY MOUTH EVERY DAY 90 Tab 0    mirtazapine (REMERON) 30 mg tablet TK 1 T PO Q NIGHT  1    [START ON 10/17/2018] ALPRAZolam (XANAX) 0.5 mg tablet Take 1 Tab by mouth three (3) times daily as needed for Anxiety for up to 30 days. Max Daily Amount: 1.5 mg. 90 Tab 0    [START ON 7/18/2018] ALPRAZolam (XANAX) 0.5 mg tablet Take 1 Tab by mouth three (3) times daily as needed for Anxiety for up to 30 days. Max 1.5 mg daily 90 Tab 0    atenolol (TENORMIN) 100 mg tablet TAKE 1 TABLET BY MOUTH EVERY DAY. 90 Tab 0    metFORMIN (GLUCOPHAGE) 500 mg tablet TAKE 2 TABLETS BY MOUTH TWICE DAILY WITH MEALS 360 Tab 0    glipiZIDE SR (GLUCOTROL XL) 10 mg CR tablet TAKE 1 TABLET BY MOUTH EVERY DAY 90 Tab 0    fenofibrate (LOFIBRA) 160 mg tablet TAKE 1 TABLET BY MOUTH EVERY DAY WITH A MEAL 90 Tab 5    simvastatin (ZOCOR) 40 mg tablet Take 1 Tab by mouth nightly. 90 Tab 0    aspirin delayed-release 81 mg tablet Take  by mouth daily.        Patient Active Problem List   Diagnosis Code    Essential hypertension I10    Controlled type 2 diabetes mellitus without complication, without long-term current use of insulin (HCC) E11.9    Hyperlipidemia E78.5    Chronic left shoulder pain M25.512, G89.29    Pain management contract agreement Z02.89    ACP (advance care planning) Z71.89    Type 2 diabetes mellitus with nephropathy ([facility]) E11.21    Recurrent depression ([facility] [address] F33.9    TACHO (generalized anxiety disorder) F41.1    MDD (major depressive disorder), recurrent episode, moderate ([address] F33.1    Palpitations R00.2     Past Medical History:   Diagnosis Date    Depression     Diabetes ([facility] [address]     Hypercholesterolemia     Hypertension      Social History     Social History    Marital status:      Spouse name: N/A    Number of children: N/A    Years of education: N/A     Social History Main Topics    Smoking status: Current Every Day Smoker     Packs/day: 0.50    Smokeless tobacco: Never Used    Alcohol use No    Drug use: None    Sexual activity: Not Asked     Other Topics Concern    None     Social History Narrative     Family History   Problem Relation Age of Onset    Emphysema Mother     Hypertension Father     Heart Attack Father     Emphysema Paternal Grandmother         Review of Systems   Constitutional: Negative for chills and fever. HENT: Positive for congestion. Respiratory: Positive for shortness of breath. Negative for cough. Cardiovascular: Negative for chest pain. Gastrointestinal: Negative for nausea. Vitals:    07/10/18 1438   BP: 110/80   Pulse: 80   Resp: 14   Temp: 97.7 °F (36.5 °C)   TempSrc: Oral   SpO2: 97%   Weight: 200 lb (90.7 kg)   Height: 5' 8.5\" (1.74 m)   PainSc:   0 - No pain       Physical Exam   Constitutional: He is well-developed, well-nourished, and in no distress. Neck: Normal range of motion. Cardiovascular: Normal rate, regular rhythm and normal heart sounds. Pulmonary/Chest: Effort normal and breath sounds normal.   Neurological: He is alert. Nursing note and vitals reviewed. Assessment/Plan      ICD-10-CM ICD-9-CM    1. SOB (shortness of breath) on exertion R06.02 786.05 REFERRAL TO PULMONARY DISEASE      XR CHEST PA LAT     I have discussed the diagnosis with the patient and the intended plan of care as seen in the above orders. The patient has received an after-visit summary and questions were answered concerning future plans. I have discussed medication, side effects, and warnings with the patient in detail.  The patient verbalized understanding and is in agreement with the plan of care. The patient will contact the office with any additional concerns.       Follow-up Disposition:  Return if symptoms worsen or fail to improve.  lab results and schedule of future lab studies reviewed with patient    Kendrick Barreto MD

## 2018-07-25 ENCOUNTER — HOSPITAL ENCOUNTER (OUTPATIENT)
Dept: GENERAL RADIOLOGY | Age: 73
Discharge: HOME | End: 2018-07-25
Payer: MEDICARE

## 2018-07-25 DIAGNOSIS — R06.02 SOB (SHORTNESS OF BREATH) ON EXERTION: ICD-10-CM

## 2018-07-25 PROCEDURE — 71046 X-RAY EXAM CHEST 2 VIEWS: CPT

## 2018-08-06 RX ORDER — GLIPIZIDE 10 MG/1
TABLET, FILM COATED, EXTENDED RELEASE ORAL
Qty: 90 TAB | Refills: 0 | Status: SHIPPED | OUTPATIENT
Start: 2018-08-06 | End: 2018-11-02 | Stop reason: SDUPTHER

## 2018-08-30 DIAGNOSIS — E11.9 CONTROLLED TYPE 2 DIABETES MELLITUS WITHOUT COMPLICATION, WITHOUT LONG-TERM CURRENT USE OF INSULIN (HCC): ICD-10-CM

## 2018-08-30 RX ORDER — METFORMIN HYDROCHLORIDE 500 MG/1
TABLET ORAL
Qty: 360 TAB | Refills: 0 | Status: SHIPPED | OUTPATIENT
Start: 2018-08-30 | End: 2019-02-02 | Stop reason: SDUPTHER

## 2018-08-30 RX ORDER — ATENOLOL 100 MG/1
TABLET ORAL
Qty: 90 TAB | Refills: 0 | Status: SHIPPED | OUTPATIENT
Start: 2018-08-30 | End: 2018-12-04 | Stop reason: SDUPTHER

## 2018-10-07 DIAGNOSIS — I10 ESSENTIAL HYPERTENSION: ICD-10-CM

## 2018-10-08 RX ORDER — BENAZEPRIL HYDROCHLORIDE 40 MG/1
TABLET ORAL
Qty: 90 TAB | Refills: 0 | Status: SHIPPED | OUTPATIENT
Start: 2018-10-08 | End: 2019-01-05 | Stop reason: SDUPTHER

## 2018-10-10 ENCOUNTER — CLINICAL SUPPORT (OUTPATIENT)
Dept: FAMILY MEDICINE CLINIC | Age: 73
End: 2018-10-10

## 2018-10-10 DIAGNOSIS — Z23 ENCOUNTER FOR IMMUNIZATION: Primary | ICD-10-CM

## 2018-10-16 ENCOUNTER — TELEPHONE (OUTPATIENT)
Dept: FAMILY MEDICINE CLINIC | Age: 73
End: 2018-10-16

## 2018-10-16 NOTE — TELEPHONE ENCOUNTER
Called patient back no answer left message asking patient to call the office back to discuss Pulmonary referral office number had been left.

## 2018-10-16 NOTE — TELEPHONE ENCOUNTER
. Jo Ann Mayorga called inquiring about his past referral to pulmonology. He said he didn't go back in July 2018 when he was supposed to. I gave him the information to that office, and he is calling. However, he wants to know if there is one closer to his home.

## 2018-10-18 NOTE — TELEPHONE ENCOUNTER
Patient called the office while I was not available, called patient had him verify his  he states he has been in touch with Pulmonary and is okay with this location.

## 2018-10-22 ENCOUNTER — OFFICE VISIT (OUTPATIENT)
Dept: FAMILY MEDICINE CLINIC | Age: 73
End: 2018-10-22

## 2018-10-22 ENCOUNTER — HOSPITAL ENCOUNTER (OUTPATIENT)
Dept: LAB | Age: 73
Discharge: HOME | End: 2018-10-22
Payer: MEDICARE

## 2018-10-22 VITALS
DIASTOLIC BLOOD PRESSURE: 70 MMHG | HEIGHT: 69 IN | SYSTOLIC BLOOD PRESSURE: 112 MMHG | TEMPERATURE: 98.3 F | HEART RATE: 77 BPM | OXYGEN SATURATION: 97 % | BODY MASS INDEX: 29.21 KG/M2 | WEIGHT: 197.2 LBS

## 2018-10-22 DIAGNOSIS — K02.9 DENTAL CARIES: ICD-10-CM

## 2018-10-22 DIAGNOSIS — N52.8 OTHER MALE ERECTILE DYSFUNCTION: ICD-10-CM

## 2018-10-22 DIAGNOSIS — E11.21 TYPE 2 DIABETES MELLITUS WITH NEPHROPATHY (HCC): ICD-10-CM

## 2018-10-22 DIAGNOSIS — E78.49 OTHER HYPERLIPIDEMIA: ICD-10-CM

## 2018-10-22 DIAGNOSIS — I10 ESSENTIAL HYPERTENSION: Primary | ICD-10-CM

## 2018-10-22 DIAGNOSIS — I10 ESSENTIAL HYPERTENSION: ICD-10-CM

## 2018-10-22 LAB
ALBUMIN SERPL-MCNC: 4.2 G/DL (ref 3.4–5)
ALBUMIN/GLOB SERPL: 1.6 {RATIO} (ref 0.8–1.7)
ALP SERPL-CCNC: 62 U/L (ref 45–117)
ALT SERPL-CCNC: 52 U/L (ref 16–61)
ANION GAP SERPL CALC-SCNC: 7 MMOL/L (ref 3–18)
AST SERPL-CCNC: 38 U/L (ref 15–37)
BILIRUB SERPL-MCNC: 0.4 MG/DL (ref 0.2–1)
BUN SERPL-MCNC: 6 MG/DL (ref 7–18)
BUN/CREAT SERPL: 6 (ref 12–20)
CALCIUM SERPL-MCNC: 9.1 MG/DL (ref 8.5–10.1)
CHLORIDE SERPL-SCNC: 101 MMOL/L (ref 100–108)
CHOLEST SERPL-MCNC: 151 MG/DL
CO2 SERPL-SCNC: 28 MMOL/L (ref 21–32)
CREAT SERPL-MCNC: 1.08 MG/DL (ref 0.6–1.3)
GLOBULIN SER CALC-MCNC: 2.7 G/DL (ref 2–4)
GLUCOSE SERPL-MCNC: 302 MG/DL (ref 74–99)
HBA1C MFR BLD: 9.5 % (ref 4.2–5.6)
HDLC SERPL-MCNC: 29 MG/DL (ref 40–60)
HDLC SERPL: 5.2 {RATIO} (ref 0–5)
LDLC SERPL CALC-MCNC: ABNORMAL MG/DL (ref 0–100)
LIPID PROFILE,FLP: ABNORMAL
POTASSIUM SERPL-SCNC: 4 MMOL/L (ref 3.5–5.5)
PROT SERPL-MCNC: 6.9 G/DL (ref 6.4–8.2)
SODIUM SERPL-SCNC: 136 MMOL/L (ref 136–145)
TRIGL SERPL-MCNC: 477 MG/DL (ref ?–150)
VLDLC SERPL CALC-MCNC: ABNORMAL MG/DL

## 2018-10-22 PROCEDURE — 80053 COMPREHEN METABOLIC PANEL: CPT | Performed by: FAMILY MEDICINE

## 2018-10-22 PROCEDURE — 82043 UR ALBUMIN QUANTITATIVE: CPT | Performed by: FAMILY MEDICINE

## 2018-10-22 PROCEDURE — 84403 ASSAY OF TOTAL TESTOSTERONE: CPT | Performed by: FAMILY MEDICINE

## 2018-10-22 PROCEDURE — 80061 LIPID PANEL: CPT | Performed by: FAMILY MEDICINE

## 2018-10-22 PROCEDURE — 83036 HEMOGLOBIN GLYCOSYLATED A1C: CPT | Performed by: FAMILY MEDICINE

## 2018-10-22 NOTE — PATIENT INSTRUCTIONS
High Blood Pressure: Care Instructions  Your Care Instructions    If your blood pressure is usually above 130/80, you have high blood pressure, or hypertension. That means the top number is 130 or higher or the bottom number is 80 or higher, or both. Despite what a lot of people think, high blood pressure usually doesn't cause headaches or make you feel dizzy or lightheaded. It usually has no symptoms. But it does increase your risk for heart attack, stroke, and kidney or eye damage. The higher your blood pressure, the more your risk increases. Your doctor will give you a goal for your blood pressure. Your goal will be based on your health and your age. Lifestyle changes, such as eating healthy and being active, are always important to help lower blood pressure. You might also take medicine to reach your blood pressure goal.  Follow-up care is a key part of your treatment and safety. Be sure to make and go to all appointments, and call your doctor if you are having problems. It's also a good idea to know your test results and keep a list of the medicines you take. How can you care for yourself at home? Medical treatment  · If you stop taking your medicine, your blood pressure will go back up. You may take one or more types of medicine to lower your blood pressure. Be safe with medicines. Take your medicine exactly as prescribed. Call your doctor if you think you are having a problem with your medicine. · Talk to your doctor before you start taking aspirin every day. Aspirin can help certain people lower their risk of a heart attack or stroke. But taking aspirin isn't right for everyone, because it can cause serious bleeding. · See your doctor regularly. You may need to see the doctor more often at first or until your blood pressure comes down. · If you are taking blood pressure medicine, talk to your doctor before you take decongestants or anti-inflammatory medicine, such as ibuprofen.  Some of these medicines can raise blood pressure. · Learn how to check your blood pressure at home. Lifestyle changes  · Stay at a healthy weight. This is especially important if you put on weight around the waist. Losing even 10 pounds can help you lower your blood pressure. · If your doctor recommends it, get more exercise. Walking is a good choice. Bit by bit, increase the amount you walk every day. Try for at least 30 minutes on most days of the week. You also may want to swim, bike, or do other activities. · Avoid or limit alcohol. Talk to your doctor about whether you can drink any alcohol. · Try to limit how much sodium you eat to less than 2,300 milligrams (mg) a day. Your doctor may ask you to try to eat less than 1,500 mg a day. · Eat plenty of fruits (such as bananas and oranges), vegetables, legumes, whole grains, and low-fat dairy products. · Lower the amount of saturated fat in your diet. Saturated fat is found in animal products such as milk, cheese, and meat. Limiting these foods may help you lose weight and also lower your risk for heart disease. · Do not smoke. Smoking increases your risk for heart attack and stroke. If you need help quitting, talk to your doctor about stop-smoking programs and medicines. These can increase your chances of quitting for good. When should you call for help? Call 911 anytime you think you may need emergency care. This may mean having symptoms that suggest that your blood pressure is causing a serious heart or blood vessel problem. Your blood pressure may be over 180/120.   For example, call 911 if:    · You have symptoms of a heart attack. These may include:  ? Chest pain or pressure, or a strange feeling in the chest.  ? Sweating. ? Shortness of breath. ? Nausea or vomiting. ? Pain, pressure, or a strange feeling in the back, neck, jaw, or upper belly or in one or both shoulders or arms. ? Lightheadedness or sudden weakness.   ? A fast or irregular heartbeat.     · You have symptoms of a stroke. These may include:  ? Sudden numbness, tingling, weakness, or loss of movement in your face, arm, or leg, especially on only one side of your body. ? Sudden vision changes. ? Sudden trouble speaking. ? Sudden confusion or trouble understanding simple statements. ? Sudden problems with walking or balance. ? A sudden, severe headache that is different from past headaches.     · You have severe back or belly pain.    Do not wait until your blood pressure comes down on its own. Get help right away.   Call your doctor now or seek immediate care if:    · Your blood pressure is much higher than normal (such as 180/120 or higher), but you don't have symptoms.     · You think high blood pressure is causing symptoms, such as:  ? Severe headache.  ? Blurry vision.    Watch closely for changes in your health, and be sure to contact your doctor if:    · Your blood pressure measures higher than your doctor recommends at least 2 times. That means the top number is higher or the bottom number is higher, or both.     · You think you may be having side effects from your blood pressure medicine. Where can you learn more? Go to http://dariusz-stanley.info/. Enter L787 in the search box to learn more about \"High Blood Pressure: Care Instructions. \"  Current as of: December 6, 2017  Content Version: 11.8  © 7553-2797 [employer]. Care instructions adapted under license by [employer] (which disclaims liability or warranty for this information). If you have questions about a medical condition or this instruction, always ask your healthcare professional. [place] [address] any warranty or liability for your use of this information.

## 2018-10-22 NOTE — PROGRESS NOTES
Patient being seen today for f/u on is blood pressure and cholesterol. 1. Have you been to the ER, urgent care clinic since your last visit? Hospitalized since your last visit? No  2. Have you seen or consulted any other health care providers outside of the [address] [place] [address] since your last visit? Include any pap smears or colon screening. No    Medication reconciliation has been completed with patient. Care team discussed/updated as well as pharmacy. Care everywhere has been ran. Health Maintenance reviewed - Lab orders have been pended, patient will discuss reaming HM with provider. Shelby Sierra

## 2018-10-22 NOTE — PROGRESS NOTES
Kali Spangler is a 72 y.o. male  presents for cough. He states that he has appt with pulm in 2 days. He complains of erectile dysfunction. He has multiple dental caries and is seeing dentist for dentures. No Known Allergies  Outpatient Medications Marked as Taking for the 10/22/18 encounter (Office Visit) with Tanya White MD   Medication Sig Dispense Refill    benazepril (LOTENSIN) 40 mg tablet TAKE 1 TABLET BY MOUTH EVERY DAY 90 Tab 0    metFORMIN (GLUCOPHAGE) 500 mg tablet TAKE 2 TABLETS BY MOUTH TWICE DAILY WITH MEALS 360 Tab 0    atenolol (TENORMIN) 100 mg tablet TAKE 1 TABLET BY MOUTH EVERY DAY. 90 Tab 0    glipiZIDE SR (GLUCOTROL XL) 10 mg CR tablet TAKE 1 TABLET BY MOUTH EVERY DAY 90 Tab 0    ALPRAZolam (XANAX) 0.5 mg tablet Take 1 Tab by mouth three (3) times daily as needed for Anxiety for up to 30 days. Max Daily Amount: 1.5 mg. 90 Tab 0    fenofibrate (LOFIBRA) 160 mg tablet TAKE 1 TABLET BY MOUTH EVERY DAY WITH A MEAL 90 Tab 5    simvastatin (ZOCOR) 40 mg tablet Take 1 Tab by mouth nightly. 90 Tab 0    aspirin delayed-release 81 mg tablet Take  by mouth daily.        Patient Active Problem List   Diagnosis Code    Essential hypertension I10    Controlled type 2 diabetes mellitus without complication, without long-term current use of insulin (HCC) E11.9    Hyperlipidemia E78.5    Chronic left shoulder pain M25.512, G89.29    Pain management contract agreement Z02.89    ACP (advance care planning) Z71.89    Type 2 diabetes mellitus with nephropathy (HCC) E11.21    Recurrent depression ([facility] F33.9    TACHO (generalized anxiety disorder) F41.1    MDD (major depressive disorder), recurrent episode, moderate ([facility]) F33.1    Palpitations R00.2     Past Medical History:   Diagnosis Date    Depression     Diabetes ([facility]     Hypercholesterolemia     Hypertension      Social History     Socioeconomic History    Marital status:      Spouse name: Not on file    Number of children: Not on file    Years of education: Not on file    Highest education level: Not on file   Social Needs    Financial resource strain: Not on file    Food insecurity - worry: Not on file    Food insecurity - inability: Not on file    Transportation needs - medical: Not on file   [employer] needs - non-medical: Not on file   Occupational History    Not on file   Tobacco Use    Smoking status: Current Every Day Smoker     Packs/day: 0.50     Years: 55.00     Pack years: 27.50     Types: Cigarettes    Smokeless tobacco: Never Used   Substance and Sexual Activity    Alcohol use: No    Drug use: Not on file    Sexual activity: Not on file   Other Topics Concern    Not on file   Social History Narrative    Not on file     Family History   Problem Relation Age of Onset    Emphysema Mother     Hypertension Father     Heart Attack Father     Emphysema Paternal Grandmother         Review of Systems   Constitutional: Negative for chills, fever, malaise/fatigue and weight loss. Eyes: Negative for blurred vision. Respiratory: Positive for cough. Negative for hemoptysis, sputum production, shortness of breath and wheezing. Cardiovascular: Negative for chest pain. Gastrointestinal: Negative for nausea and vomiting. Musculoskeletal: Negative for myalgias. Skin: Negative for rash. Neurological: Negative for weakness. Vitals:    10/22/18 1313   BP: 112/70   Pulse: 77   Temp: 98.3 °F (36.8 °C)   TempSrc: Oral   SpO2: 97%   Weight: 197 lb 3.2 oz (89.4 kg)   Height: 5' 8.5\" (1.74 m)   PainSc:   0 - No pain       Physical Exam   Constitutional: He is oriented to person, place, and time and well-developed, well-nourished, and in no distress. Neck: Normal range of motion. Neck supple. No thyromegaly present. Cardiovascular: Normal rate, regular rhythm and normal heart sounds. Pulmonary/Chest: Effort normal and breath sounds normal.   Musculoskeletal: Normal range of motion. Neurological: He is alert and oriented to person, place, and time. Skin: Skin is warm and dry. Psychiatric: Mood, memory, affect and judgment normal.   Nursing note and vitals reviewed. Assessment/Plan      ICD-10-CM ICD-9-CM    1. Essential hypertension X49 648.6 METABOLIC PANEL, COMPREHENSIVE   2. Other hyperlipidemia E78.49 272.4 LIPID PANEL   3. Type 2 diabetes mellitus with nephropathy (HCC) E11.21 250.40 HEMOGLOBIN A1C W/O EAG     583.81 MICROALBUMIN, UR, RAND W/ MICROALB/CREAT RATIO   4. Other male erectile dysfunction N52.8 607.84 TESTOSTERONE, FREE & TOTAL   5. Dental caries K02.9 521.00      I have discussed the diagnosis with the patient and the intended plan of care as seen in the above orders. The patient has received an after-visit summary and questions were answered concerning future plans. I have discussed medication, side effects, and warnings with the patient in detail. The patient verbalized understanding and is in agreement with the plan of care. The patient will contact the office with any additional concerns. Follow-up Disposition:  Return in about 3 months (around 1/22/2019).   lab results and schedule of future lab studies reviewed with patient    Dina Akbar MD

## 2018-10-23 LAB
CREAT UR-MCNC: 74.45 MG/DL (ref 30–125)
MICROALBUMIN UR-MCNC: 2.4 MG/DL (ref 0–3)
MICROALBUMIN/CREAT UR-RTO: 32 MG/G (ref 0–30)

## 2018-10-23 NOTE — PROGRESS NOTES
Triglyceride was up but cholesterol was down. Patient was not fasting. Glucose 302 and A1c elevated. Please call pt and make sure he is taking both meds for blood sugar. And have him follow up in one month.

## 2018-10-24 ENCOUNTER — CLINICAL SUPPORT (OUTPATIENT)
Dept: PULMONOLOGY | Age: 73
End: 2018-10-24

## 2018-10-24 ENCOUNTER — OFFICE VISIT (OUTPATIENT)
Dept: PULMONOLOGY | Age: 73
End: 2018-10-24

## 2018-10-24 VITALS
DIASTOLIC BLOOD PRESSURE: 68 MMHG | HEART RATE: 85 BPM | BODY MASS INDEX: 29.51 KG/M2 | RESPIRATION RATE: 18 BRPM | OXYGEN SATURATION: 99 % | HEIGHT: 69 IN | SYSTOLIC BLOOD PRESSURE: 118 MMHG | WEIGHT: 199.2 LBS | TEMPERATURE: 97.9 F

## 2018-10-24 VITALS
OXYGEN SATURATION: 99 % | BODY MASS INDEX: 30.2 KG/M2 | HEART RATE: 85 BPM | TEMPERATURE: 97.9 F | WEIGHT: 199.3 LBS | RESPIRATION RATE: 18 BRPM | HEIGHT: 68 IN | SYSTOLIC BLOOD PRESSURE: 116 MMHG | DIASTOLIC BLOOD PRESSURE: 68 MMHG

## 2018-10-24 DIAGNOSIS — R06.02 SHORTNESS OF BREATH: ICD-10-CM

## 2018-10-24 DIAGNOSIS — F17.210 CIGARETTE NICOTINE DEPENDENCE WITHOUT COMPLICATION: ICD-10-CM

## 2018-10-24 DIAGNOSIS — R06.02 SOB (SHORTNESS OF BREATH): Primary | ICD-10-CM

## 2018-10-24 DIAGNOSIS — R05.3 PERSISTENT COUGH FOR 3 WEEKS OR LONGER: ICD-10-CM

## 2018-10-24 DIAGNOSIS — R05.3 PERSISTENT COUGH FOR 3 WEEKS OR LONGER: Primary | ICD-10-CM

## 2018-10-24 DIAGNOSIS — K21.9 LARYNGOPHARYNGEAL REFLUX (LPR): ICD-10-CM

## 2018-10-24 DIAGNOSIS — R06.09 DYSPNEA ON EXERTION: ICD-10-CM

## 2018-10-24 LAB
TESTOST FREE SERPL-MCNC: 5.5 PG/ML (ref 6.6–18.1)
TESTOST SERPL-MCNC: 341 NG/DL (ref 264–916)

## 2018-10-24 RX ORDER — OMEPRAZOLE 40 MG/1
40 CAPSULE, DELAYED RELEASE ORAL DAILY
Qty: 90 CAP | Refills: 0 | Status: SHIPPED | OUTPATIENT
Start: 2018-10-24 | End: 2019-02-18 | Stop reason: ALTCHOICE

## 2018-10-24 RX ORDER — OMEPRAZOLE 40 MG/1
40 CAPSULE, DELAYED RELEASE ORAL DAILY
Qty: 30 CAP | Refills: 5 | Status: SHIPPED | OUTPATIENT
Start: 2018-10-24 | End: 2018-10-24 | Stop reason: SDUPTHER

## 2018-10-24 NOTE — PROGRESS NOTES
[facility] [place] [address] [facility]  Pulmonary, Critical Care, and Sleep Medicine    Pulmonary Office Initial Consultation  Name: Ramsey Madrid 72 y.o. male  MRN: [id number]  : 1945  Service Date: 10/24/18    Referring Provider: Les Bella, [address] [facility] [address] [facility] [address]  [facility]  Chief Complaint:   Chief Complaint   Patient presents with   [facility] Referral / Consult     referred by Dr. Juan Manuel Christiansen for SOB; CXR 2018    Cough       History of Present Illness:  Ramsey Madrid is a 72 y.o. male, who presents to Pulmonary clinic referred for cough and dyspnea. Pt reports chronic cough that has been going since early summer. Occurs spontaneous - with a prodrome of \"a tickle in the back of my throat\"  Associated with chest congestion  No relief with mucinex, no sputum with it either  Started in  with a sore throat. He saw his PCP - pt prescribed an albuterol HFA but pt did not use it. Pt reports persistent sx with worsening. Pt reports episodes of coughing throughout the day. No precipitating factors, aggravated by dry mouth and laying flat, only alleviated some by drinking fluids such as water or carbonated beverages. He reports episodes wake him up in the middle of the night - nightly. Pt reports he has lost his physical strength that is worsening since this time as well. Pt reports dyspnea on exertion - unable to climb 2 flights of stairs without stopping. Pt reports that sx were present in July, resolved in august and then worsened again in September. Pt reports some generalized malaise.   Denies fevers, chills, N/V/D, hemoptysis, angina, LE swelling, voice hoarseness, diplopia    Smoking Hx: 0.25PPD, prior 1PPD  Occ Hx:  Retired, prior  in [place], then  for [employer], no exposures to coal/silica/asbestos      Past Medical Hx: I have personally reviewed medical hx  Past Medical History:   Diagnosis Date    Depression     Diabetes ([facility] [address] [facility]     Hypercholesterolemia     Hypertension        Past Surgical Hx: I have personally reviewed surgical hx  Past Surgical History:   Procedure Laterality Date    HX ORTHOPAEDIC  2010    left shoulder repair    HX TUMOR 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 No family hx of cancer or hereditary lung disease with immediate family  Family History   Problem Relation Age of Onset    Emphysema Mother     Hypertension Father     Heart Attack Father     Emphysema Paternal Grandmother        Social Hx: I have personally reviewed the social hx. Social History     Socioeconomic History    Marital status:      Spouse name: Not on file    Number of children: Not on file    Years of education: Not on file    Highest education level: Not on file   Social Needs    Financial resource strain: Not on file    Food insecurity - worry: Not on file    Food insecurity - inability: Not on file    Transportation needs - medical: Not on file   [employer] needs - non-medical: Not on file   Occupational History    Not on file   Tobacco Use    Smoking status: Current Every Day Smoker     Packs/day: 0.25     Years: 55.00     Pack years: 13.75     Types: Cigarettes    Smokeless tobacco: Never Used   Substance and Sexual Activity    Alcohol use: No    Drug use: Not on file    Sexual activity: Not on file   Other Topics Concern    Not on file   Social History Narrative    Not on file       Allergies: I have reviewed the allergy hx  No Known Allergies    Medications:  I have reviewed the patient's medications  Prior to Admission medications    Medication Sig Start Date End Date Taking?  Authorizing Provider   benazepril (LOTENSIN) 40 mg tablet TAKE 1 TABLET BY MOUTH EVERY DAY 10/8/18  Yes Honey Graham MD   metFORMIN (GLUCOPHAGE) 500 mg tablet TAKE 2 TABLETS BY MOUTH TWICE DAILY WITH MEALS 8/30/18  Yes Kirill Greene MD   atenolol (TENORMIN) 100 mg tablet TAKE 1 TABLET BY MOUTH EVERY DAY. 8/30/18  Yes Kirill Greene MD   glipiZIDE SR (GLUCOTROL XL) 10 mg CR tablet TAKE 1 TABLET BY MOUTH EVERY DAY 8/6/18  Yes Kirill Greene MD   ALPRAZolam [place]) 0.5 mg tablet Take 1 Tab by mouth three (3) times daily as needed for Anxiety for up to 30 days. Max Daily Amount: 1.5 mg. 10/17/18 11/16/18 Yes Willi Ahn MD   albuterol (PROVENTIL HFA, VENTOLIN HFA, PROAIR HFA) 90 mcg/actuation inhaler Take 2 Puffs by inhalation every four (4) hours as needed for Wheezing. 6/28/18  Yes Kirill Greene MD   fenofibrate (LOFIBRA) 160 mg tablet TAKE 1 TABLET BY MOUTH EVERY DAY WITH A MEAL 11/8/17  Yes Kirill Greene MD   simvastatin (ZOCOR) 40 mg tablet Take 1 Tab by mouth nightly. 11/8/17  Yes Kirill Greene MD   aspirin delayed-release 81 mg tablet Take  by mouth daily. Yes Provider, Historical       Immunizations:  I have reviewed the patient's immunizations  Immunization History   Administered Date(s) Administered    Influenza High Dose Vaccine PF 10/11/2017    Influenza Vaccine (Tri) Adjuvanted 10/10/2018       Review of Systems:  A complete review of systems was performed as stated in the HPI, all others are negative.       Objective:    Physical Exam:  /68 (BP 1 Location: Left arm, BP Patient Position: Sitting)   Pulse 85   Temp 97.9 °F (36.6 °C) (Oral)   Resp 18   Ht 5' 8\" (1.727 m)   Wt 90.4 kg (199 lb 4.8 oz)   SpO2 99%   BMI 30.30 kg/m²   Vitals were personally reviewed  Gen: no acute distress, pleasant and cooperative, sitting up in chair, able to climb to exam table w/o difficulty  HEENT: normocephalic/atraumatic, PERRLA, EOMI, no scleral icterus, nasal turbinates have no erythema, no nasal polyps, no oral lesions, poor dentition, Mallampati IV  Neck: supple, trachea midline, no JVD, no cervical and supraclavicular adenopathy  Chest: no lesions, with even rise and fall, no pectus excavatum or flail chest  CVS: regular rate rhythm, S1/S2, no murmurs/rubs/gallops  Lungs: good air entry B/L, CTABL, no wheezes/rales/rhonchi  Back: no kyphosis or scoliosis  Abdomen: soft, nontender, bowel sounds present, no hepatosplenomegaly  Ext: no pitting edema B/L, no peripheral cyanosis or clubbing  Neuro: grossly normal, AAOx3, normal strength and coordination grossly, no focal deficits  Skin: no rashes, erythema, lesions  Psych: normal memory, thought content, and processing    Labs: I have reviewed the patient's available labs  Lab Results   Component Value Date/Time    WBC 7.8 02/20/2018 11:42 AM    HGB 14.1 02/20/2018 11:42 AM    HCT 43.1 02/20/2018 11:42 AM    PLATELET 327 85/59/0417 11:42 AM    MCV 92.7 02/20/2018 11:42 AM     Lab Results   Component Value Date/Time    Sodium 136 10/22/2018 01:33 PM    Potassium 4.0 10/22/2018 01:33 PM    Chloride 101 10/22/2018 01:33 PM    CO2 28 10/22/2018 01:33 PM    Anion gap 7 10/22/2018 01:33 PM    Glucose 302 (H) 10/22/2018 01:33 PM    BUN 6 (L) 10/22/2018 01:33 PM    Creatinine 1.08 10/22/2018 01:33 PM    BUN/Creatinine ratio 6 (L) 10/22/2018 01:33 PM    GFR est AA >60 10/22/2018 01:33 PM    GFR est non-AA >60 10/22/2018 01:33 PM    Calcium 9.1 10/22/2018 01:33 PM    Bilirubin, total 0.4 10/22/2018 01:33 PM    AST (SGOT) 38 (H) 10/22/2018 01:33 PM    Alk.  phosphatase 62 10/22/2018 01:33 PM    Protein, total 6.9 10/22/2018 01:33 PM    Albumin 4.2 10/22/2018 01:33 PM    Globulin 2.7 10/22/2018 01:33 PM    A-G Ratio 1.6 10/22/2018 01:33 PM    ALT (SGPT) 52 10/22/2018 01:33 PM       Imaging:  I have personally reviewed patient's imaging as follows - CXR from 7/25/18 shows clear lung fields B/L - no masses, effusions, infiltrates or consolidations    Official report per Radiology  XR Results (most recent):  Results from Hospital Encounter encounter on 07/25/18   XR CHEST PA LAT    Narrative CHEST PA AND LATERAL:    INDICATIONS: Cough, smoker, shortness of breath. COMPARISONS: None    FINDINGS:     Lungs: Clear. Cardiac silhouette and mediastinal contours: Normal.    Pleural spaces: No pneumothorax or pleural effusion evident. Bones and soft tissues: Partially visualized hardware overlying the proximal  left humerus. Support lines/catheters: None. Impression Impression:        No acute cardiopulmonary findings. PFTs:  I have reviewed the patient's PFTs from today - rosario is normal, no BD response    TTE:  I have reviewed the patient's TTE results  No results found for this or any previous visit. Assessment and Plan:  72 y.o. male with:    Impression:  1. Chronic cough:  Etiology unclear at this time. I suspect there is a contribution from LPR/GERD. Other contributor may be from his ACEi - benazepril. 2. Dyspnea on exertion/SOB:  Etiology unclear if this is related to cough. Pt has normal spirometry which makes COPD less likely but he may have mild form given extensive smoking hx  3. LPR  4. Tobacco dependence:  Pt smokes 0.25PPD, prior 1PPD    Plan:  -Talk to PCP about stopping benazepril and find alternative agent (non-ACEi/ARB) for hypertension. -CT chest w/o contrast to rule out endobronchial lesion causing cough  -PFTs - TGV and DLCO at next visit  -Offered pt referral to GI or ENT for LPR vs empiric treatment. Pt agreeable to empiric treatment with PPI - prescribed omeprazole 40mg once daily - take 1 hour before dinner.  -Counseled on reflux lifestyle precautions  -Immunizations reviewed  -I spent 4 minutes with patient regarding cessation of smoking cigarettes. I reviewed health risks of tobacco use including increased risk of MI, stroke, cancer, etc.  We reviewed various approaches to cessation including pills, patches, inhaler, gum, weaning self, \"cold turkey\", and smoking cessation classes. Pt declined cessation assistance at this time.       RTC: Follow-up Disposition:  Return in about 2 months (around 12/24/2018).       Orders Placed This Encounter    GAS DILUT/WASHOUT LUNG VOL W/WO DISTRIB VENT&VOL    DIFFUSING CAPACITY    CT CHEST W WO CONT    omeprazole (PRILOSEC) 40 mg capsule         Talia Ruiz  MD/MPH     Pulmonary, Critical Care Medicine  [facility]

## 2018-10-24 NOTE — LETTER
10/25/2018 4:09 PM 
 
Patient:  Catarino Gabriel YOB: 1945 Date of Visit: 10/24/2018 Dear Feliciano Lim MD 
[address] [place] [address] [address] [address] VIA In Basket 
 : Thank you for referring Mr. Catarino Gabriel to me for evaluation/treatment. Below are the relevant portions of my assessment and plan of care. If you have questions, please do not hesitate to call me. I look forward to following Mr. Davenport along with you. Sincerely, Talia Ruiz MD/MPH Pulmonary, Critical Care Medicine [facility]

## 2018-10-24 NOTE — PROGRESS NOTES
Verbal Order with read back per Wilmer Crow MD  For PFT smart panel. AMB POC PFT complete w/ bronchodilator AMB POC PFT complete w/o bronchodilator Dr. Rochelle Boeck, MD will co-sign the orders.

## 2018-10-24 NOTE — PROGRESS NOTES
Chief Complaint Patient presents with  Referral / Consult  
  referred by Dr. Angelo Culver for SOB; CXR 7/25/2018  Cough 1. Have you been to the ER, urgent care clinic since your last visit? Hospitalized since your last visit? No 
 
2. Have you seen or consulted any other health care providers outside of the [address] [place] [address] since your last visit? Include any pap smears or colon screening.  No

## 2018-10-24 NOTE — PROGRESS NOTES
Chief Complaint   Patient presents with   [facility] Referral / Consult     referred by Dr. Martha Troy for SOB; CXR 7/25/2018    Cough     1. Have you been to the ER, urgent care clinic since your last visit? Hospitalized since your last visit? No    2. Have you seen or consulted any other health care providers outside of the [address] [place] [address] since your last visit? Include any pap smears or colon screening.  No

## 2018-10-30 ENCOUNTER — HOSPITAL ENCOUNTER (OUTPATIENT)
Dept: CT IMAGING | Age: 73
Discharge: HOME | End: 2018-10-30
Attending: INTERNAL MEDICINE

## 2018-10-30 DIAGNOSIS — R05.3 PERSISTENT COUGH FOR 3 WEEKS OR LONGER: ICD-10-CM

## 2018-10-30 DIAGNOSIS — K21.9 LARYNGOPHARYNGEAL REFLUX (LPR): ICD-10-CM

## 2018-10-30 DIAGNOSIS — R06.02 SHORTNESS OF BREATH: ICD-10-CM

## 2018-10-30 NOTE — PROGRESS NOTES
Patient was brought back to CT prep area for CT Chest with contrast ordered by Dr. Yaquelin Thorne. Patient was given the standard Diabetic education and Instruction Sheet to read and sign because he stated that he was a Diabetic and taking Metformin. After briefly looking at the sheet he said he is not doing it with contrast, I informed him that would be fine but that I must contact his physicians office to inform them that he refuses the  IV contrast and would like to do the study without the contrast.  At this point the patient stood up and said he wanted to leave. Dr. Garo Aden office contacted and informed.

## 2018-11-02 DIAGNOSIS — E78.5 HYPERLIPIDEMIA, UNSPECIFIED HYPERLIPIDEMIA TYPE: ICD-10-CM

## 2018-11-05 RX ORDER — GLIPIZIDE 10 MG/1
TABLET, FILM COATED, EXTENDED RELEASE ORAL
Qty: 90 TAB | Refills: 0 | Status: SHIPPED | OUTPATIENT
Start: 2018-11-05 | End: 2019-01-28 | Stop reason: SDUPTHER

## 2018-11-05 RX ORDER — SIMVASTATIN 40 MG/1
TABLET, FILM COATED ORAL
Qty: 90 TAB | Refills: 0 | Status: SHIPPED | OUTPATIENT
Start: 2018-11-05 | End: 2019-02-01 | Stop reason: SDUPTHER

## 2018-11-05 RX ORDER — VENLAFAXINE HYDROCHLORIDE 150 MG/1
CAPSULE, EXTENDED RELEASE ORAL
Qty: 90 CAP | Refills: 0 | Status: SHIPPED | OUTPATIENT
Start: 2018-11-05 | End: 2019-02-02 | Stop reason: SDUPTHER

## 2018-11-07 ENCOUNTER — TELEPHONE (OUTPATIENT)
Dept: FAMILY MEDICINE CLINIC | Age: 73
End: 2018-11-07

## 2018-11-07 DIAGNOSIS — F41.1 GAD (GENERALIZED ANXIETY DISORDER): Primary | ICD-10-CM

## 2018-11-07 NOTE — TELEPHONE ENCOUNTER
From: Ena Garza   Sent: 11/5/2018  12:26 PM   To: [facility]   Subject: Joao Mena After Visit Follow-Up Message.         ----- Message from [place], Generic sent at 11/5/2018 12:26 PM EST -----     Carolin Islas Nine in advance for these:   1)I have a conflicting appointment with a dermatologist on November 30.     2) I need to know if Dr Kirsten Donovan will continue to authorize refills of ALPRAZOLAM 0.5 mg.  DETAILS: This has been prescribed by Dr. Cooper Lloyd, a psychiatrist to whom Dr Kirsten Donovan referred me. Dr Марина Wetzel diagnosed generalized anxiety disorder. She has changed her specialization to Internal Medicine and no longer is available in psychiatric practice. Dr. Марина Wetzel referred me to a new psychiatry practice but I no longer need to take the medicine she had prescribed for depression. I no longer experience symptoms of depression. I continue to experience anxiety.

## 2018-11-08 RX ORDER — ALPRAZOLAM 0.5 MG/1
0.5 TABLET ORAL
Qty: 60 TAB | Refills: 1 | Status: SHIPPED | OUTPATIENT
Start: 2018-11-08 | End: 2018-12-06

## 2018-11-09 ENCOUNTER — OFFICE VISIT (OUTPATIENT)
Dept: FAMILY MEDICINE CLINIC | Age: 73
End: 2018-11-09

## 2018-11-09 VITALS
HEIGHT: 68 IN | OXYGEN SATURATION: 99 % | WEIGHT: 196 LBS | HEART RATE: 82 BPM | BODY MASS INDEX: 29.7 KG/M2 | DIASTOLIC BLOOD PRESSURE: 74 MMHG | RESPIRATION RATE: 14 BRPM | SYSTOLIC BLOOD PRESSURE: 120 MMHG | TEMPERATURE: 98 F

## 2018-11-09 DIAGNOSIS — N52.1 ERECTILE DYSFUNCTION DUE TO DISEASES CLASSIFIED ELSEWHERE: Primary | ICD-10-CM

## 2018-11-09 RX ORDER — SILDENAFIL 50 MG/1
50 TABLET, FILM COATED ORAL AS NEEDED
Qty: 8 TAB | Refills: 3 | Status: SHIPPED | OUTPATIENT
Start: 2018-11-09

## 2018-11-09 NOTE — PROGRESS NOTES
Chief Complaint   Patient presents with    Blood sugar problem    Anxiety     1. Have you been to the ER, urgent care clinic since your last visit? Hospitalized since your last visit? No    2. Have you seen or consulted any other health care providers outside of the [address] [place] [address] since your last visit? Include any pap smears or colon screening.  No

## 2018-11-09 NOTE — PROGRESS NOTES
Akosua Valente is a 72 y.o. male  presents for chronic cough. He has seen pulmonary and has CT scheduled with contrast but he was unwilling to get IV contrast.     No Known Allergies  Outpatient Medications Marked as Taking for the 11/9/18 encounter (Office Visit) with Tj Wakefield MD   Medication Sig Dispense Refill    ALPRAZolam Roselynn Jest) 0.5 mg tablet Take 1 Tab by mouth two (2) times daily as needed for Anxiety for up to 30 days. Max Daily Amount: 1 mg. 60 Tab 1    glipiZIDE SR (GLUCOTROL XL) 10 mg CR tablet TAKE 1 TABLET BY MOUTH EVERY DAY 90 Tab 0    simvastatin (ZOCOR) 40 mg tablet TAKE 1 TABLET BY MOUTH EVERY NIGHT 90 Tab 0    venlafaxine-SR (EFFEXOR-XR) 150 mg capsule TAKE 1 CAPSULE BY MOUTH DAILY 90 Cap 0    omeprazole (PRILOSEC) 40 mg capsule Take 1 Cap by mouth daily. Take an hour before dinner 90 Cap 0    benazepril (LOTENSIN) 40 mg tablet TAKE 1 TABLET BY MOUTH EVERY DAY 90 Tab 0    metFORMIN (GLUCOPHAGE) 500 mg tablet TAKE 2 TABLETS BY MOUTH TWICE DAILY WITH MEALS 360 Tab 0    atenolol (TENORMIN) 100 mg tablet TAKE 1 TABLET BY MOUTH EVERY DAY. 90 Tab 0    albuterol (PROVENTIL HFA, VENTOLIN HFA, PROAIR HFA) 90 mcg/actuation inhaler Take 2 Puffs by inhalation every four (4) hours as needed for Wheezing. 1 Inhaler 1    fenofibrate (LOFIBRA) 160 mg tablet TAKE 1 TABLET BY MOUTH EVERY DAY WITH A MEAL 90 Tab 5    simvastatin (ZOCOR) 40 mg tablet Take 1 Tab by mouth nightly. 90 Tab 0    aspirin delayed-release 81 mg tablet Take  by mouth daily.        Patient Active Problem List   Diagnosis Code    Essential hypertension I10    Controlled type 2 diabetes mellitus without complication, without long-term current use of insulin (HCC) E11.9    Hyperlipidemia E78.5    Chronic left shoulder pain M25.512, G89.29    Pain management contract agreement Z02.89    ACP (advance care planning) Z71.89    Type 2 diabetes mellitus with nephropathy (HCC) E11.21    Recurrent depression ([facility] [address] F33.9    TACHO (generalized anxiety disorder) F41.1    MDD (major depressive disorder), recurrent episode, moderate (HCC) F33.1    Palpitations R00.2     Past Medical History:   Diagnosis Date    Depression     Diabetes ([address]     Hypercholesterolemia     Hypertension      Social History     Socioeconomic History    Marital status:      Spouse name: Not on file    Number of children: Not on file    Years of education: Not on file    Highest education level: Not on file   Social Needs    Financial resource strain: Not on file    Food insecurity - worry: Not on file    Food insecurity - inability: Not on file   [employer] needs - medical: Not on file   [employer] needs - non-medical: Not on file   Occupational History    Not on file   Tobacco Use    Smoking status: Current Every Day Smoker     Packs/day: 0.25     Years: 55.00     Pack years: 13.75     Types: Cigarettes    Smokeless tobacco: Never Used   Substance and Sexual Activity    Alcohol use: No    Drug use: Not on file    Sexual activity: Not on file   Other Topics Concern    Not on file   Social History Narrative    Not on file     Family History   Problem Relation Age of Onset    Emphysema Mother     Hypertension Father     Heart Attack Father     Emphysema Paternal Grandmother         Review of Systems   Constitutional: Negative for chills, fever, malaise/fatigue and weight loss. Eyes: Negative for blurred vision. Respiratory: Positive for cough. Negative for shortness of breath and wheezing. Cardiovascular: Negative for chest pain. Gastrointestinal: Negative for nausea and vomiting. Musculoskeletal: Negative for myalgias. Skin: Negative for rash. Neurological: Negative for weakness. Endo/Heme/Allergies: Positive for environmental allergies. Negative for polydipsia. Does not bruise/bleed easily.        Vitals:    11/09/18 1320   BP: 120/74   Pulse: 82   Resp: 14   Temp: 98 °F (36.7 °C)   SpO2: 99%   Weight: 196 lb (88.9 kg)   Height: 5' 8\" (1.727 m)   PainSc:   0 - No pain       Physical Exam   Constitutional: He is oriented to person, place, and time and well-developed, well-nourished, and in no distress. Neck: Normal range of motion. Neck supple. No thyromegaly present. Cardiovascular: Normal rate, regular rhythm and normal heart sounds. Pulmonary/Chest: Effort normal and breath sounds normal.   Musculoskeletal: Normal range of motion. Neurological: He is alert and oriented to person, place, and time. Skin: Skin is warm and dry. Psychiatric: Mood, memory, affect and judgment normal.   Nursing note and vitals reviewed. Assessment/Plan      ICD-10-CM ICD-9-CM    1. Erectile dysfunction due to diseases classified elsewhere N52.1 607.84 sildenafil citrate (VIAGRA) 50 mg tablet     I have discussed the diagnosis with the patient and the intended plan of care as seen in the above orders. The patient has received an after-visit summary and questions were answered concerning future plans. I have discussed medication, side effects, and warnings with the patient in detail. The patient verbalized understanding and is in agreement with the plan of care. The patient will contact the office with any additional concerns. Follow-up Disposition:  Return in about 1 month (around 12/9/2018).   lab results and schedule of future lab studies reviewed with patient    Feliciano Lim MD

## 2018-11-09 NOTE — PATIENT INSTRUCTIONS
Allergies: Care Instructions  Your Care Instructions    Allergies occur when your body's defense system (immune system) overreacts to certain substances. The immune system treats a harmless substance as if it were a harmful germ or virus. Many things can cause this overreaction, including pollens, medicine, food, dust, animal dander, and mold. Allergies can be mild or severe. Mild allergies can be managed with home treatment. But medicine may be needed to prevent problems. Managing your allergies is an important part of staying healthy. Your doctor may suggest that you have allergy testing to help find out what is causing your allergies. When you know what things trigger your symptoms, you can avoid them. This can prevent allergy symptoms and other health problems. For severe allergies that cause reactions that affect your whole body (anaphylactic reactions), your doctor may prescribe a shot of epinephrine to carry with you in case you have a severe reaction. Learn how to give yourself the shot and keep it with you at all times. Make sure it is not . Follow-up care is a key part of your treatment and safety. Be sure to make and go to all appointments, and call your doctor if you are having problems. It's also a good idea to know your test results and keep a list of the medicines you take. How can you care for yourself at home? · If you have been told by your doctor that dust or dust mites are causing your allergy, decrease the dust around your bed:  ? Wash sheets, pillowcases, and other bedding in hot water every week. ? Use dust-proof covers for pillows, duvets, and mattresses. Avoid plastic covers because they tear easily and do not \"breathe. \" Wash as instructed on the label. ? Do not use any blankets and pillows that you do not need. ? Use blankets that you can wash in your washing machine. ? Consider removing drapes and carpets, which attract and hold dust, from your bedroom.   · If you are allergic to house dust and mites, do not use home humidifiers. Your doctor can suggest ways you can control dust and mites. · Look for signs of cockroaches. Cockroaches cause allergic reactions. Use cockroach baits to get rid of them. Then, clean your home well. Cockroaches like areas where grocery bags, newspapers, empty bottles, or cardboard boxes are stored. Do not keep these inside your home, and keep trash and food containers sealed. Seal off any spots where cockroaches might enter your home. · If you are allergic to mold, get rid of furniture, rugs, and drapes that smell musty. Check for mold in the bathroom. · If you are allergic to outdoor pollen or mold spores, use air-conditioning. Change or clean all filters every month. Keep windows closed. · If you are allergic to pollen, stay inside when pollen counts are high. Use a vacuum  with a HEPA filter or a double-thickness filter at least two times each week. · Stay inside when air pollution is bad. Avoid paint fumes, perfumes, and other strong odors. · Avoid conditions that make your allergies worse. Stay away from smoke. Do not smoke or let anyone else smoke in your house. Do not use fireplaces or wood-burning stoves. · If you are allergic to your pets, change the air filter in your furnace every month. Use high-efficiency filters. · If you are allergic to pet dander, keep pets outside or out of your bedroom. Old carpet and cloth furniture can hold a lot of animal dander. You may need to replace them. When should you call for help? Give an epinephrine shot if:    · You think you are having a severe allergic reaction.     · You have symptoms in more than one body area, such as mild nausea and an itchy mouth.    After giving an epinephrine shot call 911, even if you feel better.   Call 911 if:    · You have symptoms of a severe allergic reaction. These may include:  ? Sudden raised, red areas (hives) all over your body. ?  Swelling of the throat, mouth, lips, or tongue. ? Trouble breathing. ? Passing out (losing consciousness). Or you may feel very lightheaded or suddenly feel weak, confused, or restless.     · You have been given an epinephrine shot, even if you feel better.    Call your doctor now or seek immediate medical care if:    · You have symptoms of an allergic reaction, such as:  ? A rash or hives (raised, red areas on the skin). ? Itching. ? Swelling. ? Belly pain, nausea, or vomiting.    Watch closely for changes in your health, and be sure to contact your doctor if:    · You do not get better as expected. Where can you learn more? Go to http://dariusz-stanley.info/. Enter I120 in the search box to learn more about \"Allergies: Care Instructions. \"  Current as of: June 28, 2018  Content Version: 11.8  © 5550-8901 [employer]. Care instructions adapted under license by [employer] (which disclaims liability or warranty for this information). If you have questions about a medical condition or this instruction, always ask your healthcare professional. [address] any warranty or liability for your use of this information.

## 2018-12-04 RX ORDER — ATENOLOL 100 MG/1
TABLET ORAL
Qty: 90 TAB | Refills: 0 | Status: SHIPPED | OUTPATIENT
Start: 2018-12-04 | End: 2019-03-03 | Stop reason: SDUPTHER

## 2018-12-06 ENCOUNTER — OFFICE VISIT (OUTPATIENT)
Dept: FAMILY MEDICINE CLINIC | Age: 73
End: 2018-12-06

## 2018-12-06 VITALS
TEMPERATURE: 97.8 F | SYSTOLIC BLOOD PRESSURE: 132 MMHG | BODY MASS INDEX: 29.46 KG/M2 | HEART RATE: 89 BPM | WEIGHT: 194.4 LBS | DIASTOLIC BLOOD PRESSURE: 70 MMHG | HEIGHT: 68 IN | OXYGEN SATURATION: 98 %

## 2018-12-06 DIAGNOSIS — F41.1 GAD (GENERALIZED ANXIETY DISORDER): ICD-10-CM

## 2018-12-06 RX ORDER — BUSPIRONE HYDROCHLORIDE 15 MG/1
15 TABLET ORAL 3 TIMES DAILY
Qty: 42 TAB | Refills: 0 | Status: SHIPPED | OUTPATIENT
Start: 2018-12-06 | End: 2018-12-20

## 2018-12-06 RX ORDER — ALPRAZOLAM 0.5 MG/1
0.5 TABLET ORAL
Qty: 90 TAB | Refills: 1 | Status: SHIPPED | OUTPATIENT
Start: 2018-12-06 | End: 2019-02-18 | Stop reason: SDUPTHER

## 2018-12-06 NOTE — PATIENT INSTRUCTIONS

## 2018-12-06 NOTE — PROGRESS NOTES
Kali Spangler is a 72 y.o. male  presents for follow up. No new complaints. No Known Allergies  Outpatient Medications Marked as Taking for the 12/6/18 encounter (Office Visit) with Tanya White MD   Medication Sig Dispense Refill    atenolol (TENORMIN) 100 mg tablet TAKE 1 TABLET BY MOUTH EVERY DAY. 90 Tab 0    sildenafil citrate (VIAGRA) 50 mg tablet Take 1 Tab by mouth as needed. 8 Tab 3    ALPRAZolam (XANAX) 0.5 mg tablet Take 1 Tab by mouth two (2) times daily as needed for Anxiety for up to 30 days. Max Daily Amount: 1 mg. 60 Tab 1    glipiZIDE SR (GLUCOTROL XL) 10 mg CR tablet TAKE 1 TABLET BY MOUTH EVERY DAY 90 Tab 0    simvastatin (ZOCOR) 40 mg tablet TAKE 1 TABLET BY MOUTH EVERY NIGHT 90 Tab 0    venlafaxine-SR (EFFEXOR-XR) 150 mg capsule TAKE 1 CAPSULE BY MOUTH DAILY 90 Cap 0    omeprazole (PRILOSEC) 40 mg capsule Take 1 Cap by mouth daily. Take an hour before dinner 90 Cap 0    benazepril (LOTENSIN) 40 mg tablet TAKE 1 TABLET BY MOUTH EVERY DAY 90 Tab 0    metFORMIN (GLUCOPHAGE) 500 mg tablet TAKE 2 TABLETS BY MOUTH TWICE DAILY WITH MEALS 360 Tab 0    fenofibrate (LOFIBRA) 160 mg tablet TAKE 1 TABLET BY MOUTH EVERY DAY WITH A MEAL 90 Tab 5    simvastatin (ZOCOR) 40 mg tablet Take 1 Tab by mouth nightly. 90 Tab 0    aspirin delayed-release 81 mg tablet Take  by mouth daily.        Patient Active Problem List   Diagnosis Code    Essential hypertension I10    Controlled type 2 diabetes mellitus without complication, without long-term current use of insulin (HCC) E11.9    Hyperlipidemia E78.5    Chronic left shoulder pain M25.512, G89.29    Pain management contract agreement Z02.89    ACP (advance care planning) Z71.89    Type 2 diabetes mellitus with nephropathy (HCC) E11.21    Recurrent depression ([facility] [address] F33.9    TACHO (generalized anxiety disorder) F41.1    MDD (major depressive disorder), recurrent episode, moderate ([facility]) F33.1    Palpitations R00.2     Past Medical History: Diagnosis Date    Depression     [facility]     Hypercholesterolemia     Hypertension      Social History     Socioeconomic History    Marital status:      Spouse name: Not on file    Number of children: Not on file    Years of education: Not on file    Highest education level: Not on file   Tobacco Use    Smoking status: Current Every Day Smoker     Packs/day: 0.25     Years: 55.00     Pack years: 13.75     Types: Cigarettes    Smokeless tobacco: Never Used   Substance and Sexual Activity    Alcohol use: No     Family History   Problem Relation Age of Onset    Emphysema Mother     Hypertension Father     Heart Attack Father     Emphysema Paternal Grandmother         Review of Systems   Constitutional: Negative for chills, fever, malaise/fatigue and weight loss. Eyes: Negative for blurred vision. Respiratory: Negative for shortness of breath and wheezing. Cardiovascular: Negative for chest pain. Gastrointestinal: Negative for nausea and vomiting. Musculoskeletal: Negative for myalgias. Skin: Negative for rash. Neurological: Negative for weakness. Psychiatric/Behavioral: Negative. Negative for depression, hallucinations, memory loss, substance abuse and suicidal ideas. The patient is not nervous/anxious and does not have insomnia. Vitals:    12/06/18 1039   BP: 132/70   Pulse: 89   Temp: 97.8 °F (36.6 °C)   TempSrc: Oral   SpO2: 98%   Weight: 194 lb 6.4 oz (88.2 kg)   Height: 5' 8\" (1.727 m)   PainSc:   0 - No pain       Physical Exam   Constitutional: He is oriented to person, place, and time and well-developed, well-nourished, and in no distress. Neck: Normal range of motion. Neck supple. No thyromegaly present. Cardiovascular: Normal rate, regular rhythm and normal heart sounds. Pulmonary/Chest: Effort normal and breath sounds normal.   Musculoskeletal: Normal range of motion. Neurological: He is alert and oriented to person, place, and time.    Skin: Skin is warm and dry. Psychiatric: Mood, memory, affect and judgment normal.   Nursing note and vitals reviewed. Assessment/Plan      ICD-10-CM ICD-9-CM    1. TACHO (generalized anxiety disorder) F41.1 300.02 ALPRAZolam (XANAX) 0.5 mg tablet      busPIRone (BUSPAR) 15 mg tablet     I have discussed the diagnosis with the patient and the intended plan of care as seen in the above orders. The patient has received an after-visit summary and questions were answered concerning future plans. I have discussed medication, side effects, and warnings with the patient in detail. The patient verbalized understanding and is in agreement with the plan of care. The patient will contact the office with any additional concerns. Follow-up Disposition:  Return in about 2 months (around 2/6/2019).   lab results and schedule of future lab studies reviewed with patient    Cierra Rubin MD

## 2018-12-06 NOTE — PROGRESS NOTES
Patient here for 1 month f/u he states he would like to discuss his medications today. 1. Have you been to the ER, urgent care clinic since your last visit? Hospitalized since your last visit? No  2. Have you seen or consulted any other health care providers outside of the [address] [place] [address] since your last visit? Include any pap smears or colon screening.  No

## 2018-12-10 ENCOUNTER — TELEPHONE (OUTPATIENT)
Dept: FAMILY MEDICINE CLINIC | Age: 73
End: 2018-12-10

## 2018-12-10 ENCOUNTER — OFFICE VISIT (OUTPATIENT)
Dept: FAMILY MEDICINE CLINIC | Age: 73
End: 2018-12-10

## 2018-12-10 VITALS
SYSTOLIC BLOOD PRESSURE: 124 MMHG | TEMPERATURE: 97.5 F | OXYGEN SATURATION: 99 % | RESPIRATION RATE: 14 BRPM | WEIGHT: 189 LBS | BODY MASS INDEX: 28.64 KG/M2 | HEIGHT: 68 IN | HEART RATE: 91 BPM | DIASTOLIC BLOOD PRESSURE: 76 MMHG

## 2018-12-10 DIAGNOSIS — F41.9 ANXIETY: Primary | ICD-10-CM

## 2018-12-10 RX ORDER — CLONAZEPAM 0.5 MG/1
0.5 TABLET ORAL
Qty: 15 TAB | Refills: 0 | Status: SHIPPED | OUTPATIENT
Start: 2018-12-10 | End: 2019-02-18 | Stop reason: ALTCHOICE

## 2018-12-10 NOTE — PROGRESS NOTES
Eloina Sylvester is a 72 y.o. male  presents for follow up. He states that buspar is not helping his anxiety. He would like to try another med. He has been on xanax for years and would like to stay on this or simular meds. No tremors or chest pains. No Known Allergies  Outpatient Medications Marked as Taking for the 12/10/18 encounter (Office Visit) with Betty Wilson MD   Medication Sig Dispense Refill    clonazePAM (KLONOPIN) 0.5 mg tablet Take 1 Tab by mouth nightly as needed. Max Daily Amount: 0.5 mg. 15 Tab 0    busPIRone (BUSPAR) 15 mg tablet Take 1 Tab by mouth three (3) times daily for 14 days. 42 Tab 0    atenolol (TENORMIN) 100 mg tablet TAKE 1 TABLET BY MOUTH EVERY DAY. 90 Tab 0    sildenafil citrate (VIAGRA) 50 mg tablet Take 1 Tab by mouth as needed. 8 Tab 3    glipiZIDE SR (GLUCOTROL XL) 10 mg CR tablet TAKE 1 TABLET BY MOUTH EVERY DAY 90 Tab 0    simvastatin (ZOCOR) 40 mg tablet TAKE 1 TABLET BY MOUTH EVERY NIGHT 90 Tab 0    venlafaxine-SR (EFFEXOR-XR) 150 mg capsule TAKE 1 CAPSULE BY MOUTH DAILY 90 Cap 0    omeprazole (PRILOSEC) 40 mg capsule Take 1 Cap by mouth daily. Take an hour before dinner 90 Cap 0    benazepril (LOTENSIN) 40 mg tablet TAKE 1 TABLET BY MOUTH EVERY DAY 90 Tab 0    metFORMIN (GLUCOPHAGE) 500 mg tablet TAKE 2 TABLETS BY MOUTH TWICE DAILY WITH MEALS 360 Tab 0    albuterol (PROVENTIL HFA, VENTOLIN HFA, PROAIR HFA) 90 mcg/actuation inhaler Take 2 Puffs by inhalation every four (4) hours as needed for Wheezing. 1 Inhaler 1    fenofibrate (LOFIBRA) 160 mg tablet TAKE 1 TABLET BY MOUTH EVERY DAY WITH A MEAL 90 Tab 5    simvastatin (ZOCOR) 40 mg tablet Take 1 Tab by mouth nightly. 90 Tab 0    aspirin delayed-release 81 mg tablet Take  by mouth daily.        Patient Active Problem List   Diagnosis Code    Essential hypertension I10    Controlled type 2 diabetes mellitus without complication, without long-term current use of insulin ([facility] [address] E11.9    Hyperlipidemia E78.5    Chronic left shoulder pain M25.512, G89.29    Pain management contract agreement Z02.89    ACP (advance care planning) Z71.89    Type 2 diabetes mellitus with nephropathy (HCC) E11.21    Recurrent depression (HCC) F33.9    TACHO (generalized anxiety disorder) F41.1    MDD (major depressive disorder), recurrent episode, moderate (HCC) F33.1    Palpitations R00.2     Past Medical History:   Diagnosis Date    Depression     Diabetes ([facility] [address]     Hypercholesterolemia     Hypertension      Social History     Socioeconomic History    Marital status:      Spouse name: Not on file    Number of children: Not on file    Years of education: Not on file    Highest education level: Not on file   Tobacco Use    Smoking status: Current Every Day Smoker     Packs/day: 0.25     Years: 55.00     Pack years: 13.75     Types: Cigarettes    Smokeless tobacco: Never Used   Substance and Sexual Activity    Alcohol use: No     Family History   Problem Relation Age of Onset    Emphysema Mother     Hypertension Father     Heart Attack Father     Emphysema Paternal Grandmother         Review of Systems   Constitutional: Negative for chills, fever, malaise/fatigue and weight loss. Eyes: Negative for blurred vision. Respiratory: Negative for shortness of breath and wheezing. Cardiovascular: Negative for chest pain. Gastrointestinal: Negative for nausea and vomiting. Musculoskeletal: Negative for myalgias. Skin: Negative for rash. Neurological: Negative for weakness. Psychiatric/Behavioral: Negative for depression, hallucinations, memory loss, substance abuse and suicidal ideas. The patient is nervous/anxious. The patient does not have insomnia.         Vitals:    12/10/18 1125   BP: 124/76   Pulse: 91   Resp: 14   Temp: 97.5 °F (36.4 °C)   SpO2: 99%   Weight: 189 lb (85.7 kg)   Height: 5' 8\" (1.727 m)   PainSc:   0 - No pain       Physical Exam   Constitutional: He is oriented to person, place, and time and well-developed, well-nourished, and in no distress. Musculoskeletal: Normal range of motion. Neurological: He is alert and oriented to person, place, and time. Skin: Skin is warm and dry. Psychiatric: Mood, memory, affect and judgment normal.   Nursing note and vitals reviewed. Assessment/Plan      ICD-10-CM ICD-9-CM    1. Anxiety F41.9 300.00 clonazePAM (KLONOPIN) 0.5 mg tablet     I have discussed the diagnosis with the patient and the intended plan of care as seen in the above orders. The patient has received an after-visit summary and questions were answered concerning future plans. I have discussed medication, side effects, and warnings with the patient in detail. The patient verbalized understanding and is in agreement with the plan of care. The patient will contact the office with any additional concerns.       Follow-up Disposition: Not on File  lab results and schedule of future lab studies reviewed with patient    Mariano Brady MD

## 2018-12-10 NOTE — PROGRESS NOTES
Pt in office for TACHO f/u. States that Busleena is not working. 1. Have you been to the ER, urgent care clinic since your last visit? Hospitalized since your last visit? No    2. Have you seen or consulted any other health care providers outside of the [address] [place] [address] since your last visit? Include any pap smears or colon screening.  No

## 2018-12-10 NOTE — TELEPHONE ENCOUNTER
Pt called in stating that [facility] will not dispense the Clonopin, because it's in the same class as the Xanax, even though it's only a short term supply. He said he is having a panic attack. I advised pt to go to ER. I also told pt I would call pharmacy as well. Spoke with Chaz Walls the pharmacist at [facility]. He explained to me that d/t the the meds being in the same class they cannot prescribe the clonopin, even though it's a short term supply. I called back pt and let him know. I explained to him that he should proceed to the ER. He asked that I inform Dr. Rabia Newman of this. I told him I would. Pt expressed clear understanding and had no further questions.

## 2019-01-05 DIAGNOSIS — I10 ESSENTIAL HYPERTENSION: ICD-10-CM

## 2019-01-07 RX ORDER — BENAZEPRIL HYDROCHLORIDE 40 MG/1
TABLET ORAL
Qty: 90 TAB | Refills: 0 | Status: SHIPPED | OUTPATIENT
Start: 2019-01-07 | End: 2019-04-03 | Stop reason: SDUPTHER

## 2019-01-28 DIAGNOSIS — E78.00 PURE HYPERCHOLESTEROLEMIA: ICD-10-CM

## 2019-01-28 RX ORDER — FENOFIBRATE 160 MG/1
TABLET ORAL
Qty: 90 TAB | Refills: 0 | Status: SHIPPED | OUTPATIENT
Start: 2019-01-28 | End: 2019-04-03 | Stop reason: SDUPTHER

## 2019-01-28 RX ORDER — GLIPIZIDE 10 MG/1
TABLET, FILM COATED, EXTENDED RELEASE ORAL
Qty: 90 TAB | Refills: 0 | Status: SHIPPED | OUTPATIENT
Start: 2019-01-28 | End: 2019-04-03 | Stop reason: SDUPTHER

## 2019-02-01 DIAGNOSIS — E78.5 HYPERLIPIDEMIA, UNSPECIFIED HYPERLIPIDEMIA TYPE: ICD-10-CM

## 2019-02-01 RX ORDER — SIMVASTATIN 40 MG/1
TABLET, FILM COATED ORAL
Qty: 90 TAB | Refills: 0 | Status: SHIPPED | OUTPATIENT
Start: 2019-02-01 | End: 2019-02-18 | Stop reason: SDUPTHER

## 2019-02-02 DIAGNOSIS — E11.9 CONTROLLED TYPE 2 DIABETES MELLITUS WITHOUT COMPLICATION, WITHOUT LONG-TERM CURRENT USE OF INSULIN (HCC): ICD-10-CM

## 2019-02-04 RX ORDER — METFORMIN HYDROCHLORIDE 500 MG/1
TABLET ORAL
Qty: 360 TAB | Refills: 0 | Status: SHIPPED | OUTPATIENT
Start: 2019-02-04 | End: 2019-06-30 | Stop reason: SDUPTHER

## 2019-02-04 RX ORDER — VENLAFAXINE HYDROCHLORIDE 150 MG/1
CAPSULE, EXTENDED RELEASE ORAL
Qty: 90 CAP | Refills: 0 | Status: SHIPPED | OUTPATIENT
Start: 2019-02-04 | End: 2019-06-30 | Stop reason: SDUPTHER

## 2019-02-18 ENCOUNTER — OFFICE VISIT (OUTPATIENT)
Dept: FAMILY MEDICINE CLINIC | Age: 74
End: 2019-02-18

## 2019-02-18 VITALS
HEART RATE: 73 BPM | SYSTOLIC BLOOD PRESSURE: 110 MMHG | OXYGEN SATURATION: 97 % | BODY MASS INDEX: 29.37 KG/M2 | RESPIRATION RATE: 16 BRPM | TEMPERATURE: 98.5 F | WEIGHT: 193.8 LBS | DIASTOLIC BLOOD PRESSURE: 70 MMHG | HEIGHT: 68 IN

## 2019-02-18 DIAGNOSIS — F41.1 GAD (GENERALIZED ANXIETY DISORDER): ICD-10-CM

## 2019-02-18 DIAGNOSIS — Z71.89 ACP (ADVANCE CARE PLANNING): ICD-10-CM

## 2019-02-18 DIAGNOSIS — Z00.00 MEDICARE ANNUAL WELLNESS VISIT, SUBSEQUENT: ICD-10-CM

## 2019-02-18 DIAGNOSIS — E11.9 CONTROLLED TYPE 2 DIABETES MELLITUS WITHOUT COMPLICATION, WITHOUT LONG-TERM CURRENT USE OF INSULIN (HCC): Primary | ICD-10-CM

## 2019-02-18 DIAGNOSIS — Z12.11 SCREEN FOR COLON CANCER: ICD-10-CM

## 2019-02-18 DIAGNOSIS — Z13.5 SCREENING FOR GLAUCOMA: ICD-10-CM

## 2019-02-18 RX ORDER — ALPRAZOLAM 0.5 MG/1
0.5 TABLET ORAL
Qty: 90 TAB | Refills: 1 | Status: SHIPPED | OUTPATIENT
Start: 2019-02-18 | End: 2019-04-17 | Stop reason: SDUPTHER

## 2019-02-18 NOTE — ACP (ADVANCE CARE PLANNING)
Advance Care Planning (ACP) Provider Conversation Snapshot    Date of ACP Conversation: 02/18/19  Persons included in Conversation:  patient  Length of ACP Conversation in minutes:  16 minutes    Authorized Decision Maker (if patient is incapable of making informed decisions):    This person is:   Healthcare Agent/Medical Power of  under Advance Directive            For Patients with Decision Making Capacity:   Values/Goals: Exploration of values, goals, and preferences if recovery is not expected, even with continued medical treatment in the event of:  Imminent death  Severe, permanent brain injury    Conversation Outcomes / Follow-Up Plan:   Recommended completion of Advance Directive form after review of ACP materials and conversation with prospective healthcare agent

## 2019-02-18 NOTE — PROGRESS NOTES
Joshua Bass is a 69 y.o. male  presents for follow up. He needs refills. HPI:  
Patient has anxiety. Pain control: 
         Current : well controlled   0/10 Worst pain over last week        0/10 Least pain over the last week   0/10 Activities of Daily Living: 
          well controlled Are you able to do your normal daily activities?   all day or no Patient Goals Functional:  yes Pain: none Adverse side effects:  
          Since starting your pain medicine are you experiencing any of the following?  
           ( Examples: Constipation, fatigue, lapses in memory, depression or sexual                        Dysfunction)   No  
Is a Pain management Contract in the patient's chart? yes Aberrant behaviors:  
           none(Early refill requests, lost prescriptions, lost medicine) Pill count:  
          Is it consistent with patient's prescription? {Yes/No:00512:L: \"yes\" Last urine drug screen:   today [facility] Prescription Monitoring Program check performed:  yes Treatment Care Team:  Patient Care Team: 
Yuli Galarza MD as PCP - [facility]) Ramu Wray MD (Pulmonary Disease) Ramu Wray MD (Pulmonary Disease) Follow up contact scheduled for 1-4 weeks: yes No Known Allergies Outpatient Medications Marked as Taking for the 2/18/19 encounter (Office Visit) with Yuli Galarza MD  
Medication Sig Dispense Refill  ALPRAZolam (XANAX) 0.5 mg tablet Take 1 Tab by mouth three (3) times daily as needed for Anxiety. Max Daily Amount: 1.5 mg. 90 Tab 1  venlafaxine-SR (EFFEXOR-XR) 150 mg capsule TAKE 1 CAPSULE BY MOUTH DAILY 90 Cap 0  
 metFORMIN (GLUCOPHAGE) 500 mg tablet TAKE 2 TABLETS BY MOUTH TWICE DAILY WITH MEALS 360 Tab 0  
 glipiZIDE SR (GLUCOTROL XL) 10 mg CR tablet TAKE 1 TABLET BY MOUTH EVERY DAY 90 Tab 0  
 fenofibrate (LOFIBRA) 160 mg tablet TAKE 1 TABLET BY MOUTH EVERY DAY WITH A MEAL 90 Tab 0  
  benazepril (LOTENSIN) 40 mg tablet TAKE 1 TABLET BY MOUTH EVERY DAY 90 Tab 0  
 atenolol (TENORMIN) 100 mg tablet TAKE 1 TABLET BY MOUTH EVERY DAY. 90 Tab 0  
 sildenafil citrate (VIAGRA) 50 mg tablet Take 1 Tab by mouth as needed. 8 Tab 3  
 simvastatin (ZOCOR) 40 mg tablet Take 1 Tab by mouth nightly. 90 Tab 0  
 aspirin delayed-release 81 mg tablet Take  by mouth daily. Patient Active Problem List  
Diagnosis Code  Essential hypertension I10  
 Controlled type 2 diabetes mellitus without complication, without long-term current use of insulin ([facility]ly [facility]) E11.9  Hyperlipidemia E78.5  Chronic left shoulder pain M25.512, G89.29  
 Pain management contract agreement Z02.89  
 ACP (advance care planning) Z71.89  Type 2 diabetes mellitus with nephropathy ([facility]) E11.21  
 Recurrent depression ([facility] F33.9  TACHO (generalized anxiety disorder) F41.1  MDD (major depressive disorder), recurrent episode, moderate ([facility]) F33.1  Palpitations R00.2 Past Medical History:  
Diagnosis Date  Depression  Diabetes ([facility]  Hypercholesterolemia  Hypertension Social History Socioeconomic History  Marital status:  Spouse name: Not on file  Number of children: Not on file  Years of education: Not on file  Highest education level: Not on file Tobacco Use  Smoking status: Current Every Day Smoker Packs/day: 0.50 Years: 55.00 Pack years: 27.50 Types: Cigarettes  Smokeless tobacco: Never Used Substance and Sexual Activity  Alcohol use: No  
 
Family History Problem Relation Age of Onset  Emphysema Mother  Hypertension Father  Heart Attack Father  Emphysema Paternal Grandmother Review of Systems Constitutional: Negative for chills, fever, malaise/fatigue and weight loss. Eyes: Negative for blurred vision. Respiratory: Negative for shortness of breath and wheezing. Cardiovascular: Negative for chest pain. Gastrointestinal: Negative for nausea and vomiting. Musculoskeletal: Negative for myalgias. Skin: Negative for rash. Neurological: Negative for weakness. Vitals:  
 02/18/19 1323 BP: 110/70 Pulse: 73 Resp: 16 Temp: 98.5 °F (36.9 °C) TempSrc: Oral  
SpO2: 97% Weight: 193 lb 12.8 oz (87.9 kg) Height: 5' 8\" (1.727 m) PainSc:   0 - No pain Physical Exam  
Constitutional: He is oriented to person, place, and time and well-developed, well-nourished, and in no distress. Neck: Normal range of motion. Neck supple. No thyromegaly present. Cardiovascular: Normal rate, regular rhythm and normal heart sounds. Pulmonary/Chest: Effort normal and breath sounds normal.  
Musculoskeletal: Normal range of motion. Neurological: He is alert and oriented to person, place, and time. Skin: Skin is warm and dry. Nursing note and vitals reviewed. Assessment/Plan ICD-10-CM ICD-9-CM 1. Controlled type 2 diabetes mellitus without complication, without long-term current use of insulin (HCC) E11.9 250.00 REFERRAL TO PODIATRY REFERRAL TO OPHTHALMOLOGY 2. Screening for glaucoma Z13.5 V80.1 REFERRAL TO OPHTHALMOLOGY 3. Screen for colon cancer Z12.11 V76.51 OCCULT BLOOD IMMUNOASSAY,DIAGNOSTIC 4. TACHO (generalized anxiety disorder) F41.1 300.02 ALPRAZolam (XANAX) 0.5 mg tablet 5. ACP (advance care planning) Z71.89 V65.49   
6. Medicare annual wellness visit, subsequent Z00.00 V70.0 I have discussed the diagnosis with the patient and the intended plan of care as seen in the above orders. The patient has received an after-visit summary and questions were answered concerning future plans. I have discussed medication, side effects, and warnings with the patient in detail. The patient verbalized understanding and is in agreement with the plan of care. The patient will contact the office with any additional concerns. Follow-up Disposition: 
Return in about 1 month (around 3/18/2019). lab results and schedule of future lab studies reviewed with patient Uche Carrillo MD

## 2019-02-18 NOTE — PROGRESS NOTES
Chief Complaint Patient presents with  Anxiety Two month f/u on anxiety,  ran and given to provider for review, no UDS on file, urine sample has been collected. Gurmeet Albrecht Annual Wellness Visit 1. Have you been to the ER, urgent care clinic since your last visit? Hospitalized since your last visit? No 
2. Have you seen or consulted any other health care providers outside of the [address] [place] [address] since your last visit? Include any pap smears or colon screening. No 
 
Medication reconciliation has been completed with patient. Care team discussed/updated as well as pharmacy. Health Maintenance reviewed - Will discuss need for vaccines with provider, referrals have been pended, FIT pended, due for AAA and MWV. This is the Subsequent Medicare Annual Wellness Exam, performed 12 months or more after the Initial AWV or the last Subsequent AWV I have reviewed the patient's medical history in detail and updated the computerized patient record. History Past Medical History:  
Diagnosis Date  Depression  Diabetes ([facility]  Hypercholesterolemia  Hypertension Past Surgical History:  
Procedure Laterality Date  HX ORTHOPAEDIC  2010  
 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  
 left ear melanoma Current Outpatient Medications Medication Sig Dispense Refill  ALPRAZolam (XANAX) 0.5 mg tablet Take 1 Tab by mouth three (3) times daily as needed for Anxiety. Max Daily Amount: 1.5 mg. 90 Tab 1  venlafaxine-SR (EFFEXOR-XR) 150 mg capsule TAKE 1 CAPSULE BY MOUTH DAILY 90 Cap 0  
 metFORMIN (GLUCOPHAGE) 500 mg tablet TAKE 2 TABLETS BY MOUTH TWICE DAILY WITH MEALS 360 Tab 0  
 glipiZIDE SR (GLUCOTROL XL) 10 mg CR tablet TAKE 1 TABLET BY MOUTH EVERY DAY 90 Tab 0  
 fenofibrate (LOFIBRA) 160 mg tablet TAKE 1 TABLET BY MOUTH EVERY DAY WITH A MEAL 90 Tab 0  
 benazepril (LOTENSIN) 40 mg tablet TAKE 1 TABLET BY MOUTH EVERY DAY 90 Tab 0  
  atenolol (TENORMIN) 100 mg tablet TAKE 1 TABLET BY MOUTH EVERY DAY. 90 Tab 0  
 sildenafil citrate (VIAGRA) 50 mg tablet Take 1 Tab by mouth as needed. 8 Tab 3  
 simvastatin (ZOCOR) 40 mg tablet Take 1 Tab by mouth nightly. 90 Tab 0  
 aspirin delayed-release 81 mg tablet Take  by mouth daily.  albuterol (PROVENTIL HFA, VENTOLIN HFA, PROAIR HFA) 90 mcg/actuation inhaler Take 2 Puffs by inhalation every four (4) hours as needed for Wheezing. 1 Inhaler 1 No Known Allergies Family History Problem Relation Age of Onset  Emphysema Mother  Hypertension Father  Heart Attack Father  Emphysema Paternal Grandmother Social History Tobacco Use  Smoking status: Current Every Day Smoker Packs/day: 0.50 Years: 55.00 Pack years: 27.50 Types: Cigarettes  Smokeless tobacco: Never Used Substance Use Topics  Alcohol use: No  
 
Patient Active Problem List  
Diagnosis Code  Essential hypertension I10  
 Controlled type 2 diabetes mellitus without complication, without long-term current use of insulin ([facility]) E11.9  Hyperlipidemia E78.5  Chronic left shoulder pain M25.512, G89.29  
 Pain management contract agreement Z02.89  
 ACP (advance care planning) Z71.89  Type 2 diabetes mellitus with nephropathy (HCC) E11.21  
 Recurrent depression ([facility] F33.9  TACHO (generalized anxiety disorder) F41.1  MDD (major depressive disorder), recurrent episode, moderate ([facility]) F33.1  Palpitations R00.2 Depression Risk Factor Screening:  
 
3 most recent PHQ Screens 7/7/2017 PHQ Not Done - Little interest or pleasure in doing things Nearly every day Feeling down, depressed, irritable, or hopeless Nearly every day Total Score PHQ 2 6 Trouble falling or staying asleep, or sleeping too much Nearly every day Feeling tired or having little energy Several days Poor appetite, weight loss, or overeating More than half the days Feeling bad about yourself - or that you are a failure or have let yourself or your family down Not at all Trouble concentrating on things such as school, work, reading, or watching TV Nearly every day Moving or speaking so slowly that other people could have noticed; or the opposite being so fidgety that others notice Nearly every day Thoughts of being better off dead, or hurting yourself in some way Nearly every day PHQ 9 Score 21 How difficult have these problems made it for you to do your work, take care of your home and get along with others Somewhat difficult Alcohol Risk Factor Screening: You do not drink alcohol or very rarely. Functional Ability and Level of Safety:  
Hearing Loss Hearing is good. Activities of Daily Living The home contains: no safety equipment. Patient does total self care Fall Risk Fall Risk Assessment, last 12 mths 2/18/2019 Able to walk? Yes Fall in past 12 months? No  
 
 
Abuse Screen Patient is not abused Cognitive Screening Evaluation of Cognitive Function: 
Has your family/caregiver stated any concerns about your memory: no 
Normal 
 
Patient Care Team  
Patient Care Team: 
Maria G Hunt MD as PCP - [facility]) Yue Martínez MD (Pulmonary Disease) Yue Martínez MD (Pulmonary Disease) Assessment/Plan Education and counseling provided: 
End-of-Life planning (with patient's consent) Diagnoses and all orders for this visit: 1. Controlled type 2 diabetes mellitus without complication, without long-term current use of insulin (HCC) 
-     REFERRAL TO PODIATRY 
-     REFERRAL TO OPHTHALMOLOGY 2. Screening for glaucoma 
-     REFERRAL TO OPHTHALMOLOGY 3. Screen for colon cancer -     OCCULT BLOOD IMMUNOASSAY,DIAGNOSTIC; Future 4. TACHO (generalized anxiety disorder) -     ALPRAZolam (XANAX) 0.5 mg tablet; Take 1 Tab by mouth three (3) times daily as needed for Anxiety.  Max Daily Amount: 1.5 mg. 
 
 5. ACP (advance care planning) 6. Medicare annual wellness visit, subsequent Health Maintenance Due Topic Date Due  
 FOOT EXAM Q1  01/03/1955  
 EYE EXAM RETINAL OR DILATED  01/03/1957  
 DTaP/Tdap/Td series (1 - Tdap) 01/03/1968  Shingrix Vaccine Age 50> (1 of 2) 01/03/1997  
 FOBT Q 1 YEAR AGE 50-75  01/03/1997  GLAUCOMA SCREENING Q2Y  01/03/2012  Pneumococcal 65+ Low/Medium Risk (1 of 2 - PCV13) 01/03/2012  AAA Screening 73-69 YO Male Smoking Patients  01/03/2012  MEDICARE YEARLY EXAM  10/12/2018 Leonie Santana MD

## 2019-03-04 RX ORDER — ATENOLOL 100 MG/1
TABLET ORAL
Qty: 90 TAB | Refills: 0 | Status: SHIPPED | OUTPATIENT
Start: 2019-03-04 | End: 2019-05-10 | Stop reason: SDUPTHER

## 2019-03-18 ENCOUNTER — OFFICE VISIT (OUTPATIENT)
Dept: FAMILY MEDICINE CLINIC | Age: 74
End: 2019-03-18

## 2019-03-18 ENCOUNTER — HOSPITAL ENCOUNTER (OUTPATIENT)
Dept: LAB | Age: 74
Discharge: HOME | End: 2019-03-18
Payer: MEDICARE

## 2019-03-18 VITALS
WEIGHT: 196.6 LBS | SYSTOLIC BLOOD PRESSURE: 134 MMHG | HEIGHT: 68 IN | DIASTOLIC BLOOD PRESSURE: 80 MMHG | RESPIRATION RATE: 12 BRPM | TEMPERATURE: 98.2 F | HEART RATE: 72 BPM | OXYGEN SATURATION: 98 % | BODY MASS INDEX: 29.8 KG/M2

## 2019-03-18 DIAGNOSIS — E78.49 OTHER HYPERLIPIDEMIA: ICD-10-CM

## 2019-03-18 DIAGNOSIS — F33.9 RECURRENT DEPRESSION (HCC): ICD-10-CM

## 2019-03-18 DIAGNOSIS — Z12.5 PROSTATE CANCER SCREENING: ICD-10-CM

## 2019-03-18 DIAGNOSIS — E11.21 TYPE 2 DIABETES MELLITUS WITH NEPHROPATHY (HCC): ICD-10-CM

## 2019-03-18 DIAGNOSIS — I10 ESSENTIAL HYPERTENSION: Primary | ICD-10-CM

## 2019-03-18 PROBLEM — F33.1 MDD (MAJOR DEPRESSIVE DISORDER), RECURRENT EPISODE, MODERATE (HCC): Status: RESOLVED | Noted: 2018-03-20 | Resolved: 2019-03-18

## 2019-03-18 LAB
ALBUMIN SERPL-MCNC: 4.1 G/DL (ref 3.4–5)
ALBUMIN/GLOB SERPL: 1.4 {RATIO} (ref 0.8–1.7)
ALP SERPL-CCNC: 55 U/L (ref 45–117)
ALT SERPL-CCNC: 47 U/L (ref 16–61)
ANION GAP SERPL CALC-SCNC: 6 MMOL/L (ref 3–18)
AST SERPL-CCNC: 39 U/L (ref 15–37)
BILIRUB SERPL-MCNC: 0.6 MG/DL (ref 0.2–1)
BUN SERPL-MCNC: 7 MG/DL (ref 7–18)
BUN/CREAT SERPL: 7 (ref 12–20)
CALCIUM SERPL-MCNC: 9.1 MG/DL (ref 8.5–10.1)
CHLORIDE SERPL-SCNC: 102 MMOL/L (ref 100–108)
CHOLEST SERPL-MCNC: 149 MG/DL
CO2 SERPL-SCNC: 30 MMOL/L (ref 21–32)
CREAT SERPL-MCNC: 1.04 MG/DL (ref 0.6–1.3)
EST. AVERAGE GLUCOSE BLD GHB EST-MCNC: 194 MG/DL
GLOBULIN SER CALC-MCNC: 2.9 G/DL (ref 2–4)
GLUCOSE SERPL-MCNC: 215 MG/DL (ref 74–99)
HBA1C MFR BLD: 8.4 % (ref 4.2–5.6)
HDLC SERPL-MCNC: 37 MG/DL (ref 40–60)
HDLC SERPL: 4 {RATIO} (ref 0–5)
LDLC SERPL CALC-MCNC: 64.6 MG/DL (ref 0–100)
LIPID PROFILE,FLP: ABNORMAL
POTASSIUM SERPL-SCNC: 4.2 MMOL/L (ref 3.5–5.5)
PROT SERPL-MCNC: 7 G/DL (ref 6.4–8.2)
PSA SERPL-MCNC: 0.8 NG/ML (ref 0–4)
SODIUM SERPL-SCNC: 138 MMOL/L (ref 136–145)
TRIGL SERPL-MCNC: 237 MG/DL (ref ?–150)
VLDLC SERPL CALC-MCNC: 47.4 MG/DL

## 2019-03-18 PROCEDURE — 80061 LIPID PANEL: CPT

## 2019-03-18 PROCEDURE — 80053 COMPREHEN METABOLIC PANEL: CPT

## 2019-03-18 PROCEDURE — 84153 ASSAY OF PSA TOTAL: CPT

## 2019-03-18 PROCEDURE — 83036 HEMOGLOBIN GLYCOSYLATED A1C: CPT

## 2019-03-18 NOTE — PATIENT INSTRUCTIONS
High Blood Pressure: Care Instructions  Overview    It's normal for blood pressure to go up and down throughout the day. But if it stays up, you have high blood pressure. Another name for high blood pressure is hypertension. Despite what a lot of people think, high blood pressure usually doesn't cause headaches or make you feel dizzy or lightheaded. It usually has no symptoms. But it does increase your risk of stroke, heart attack, and other problems. You and your doctor will talk about your risks of these problems based on your blood pressure. Your doctor will give you a goal for your blood pressure. Your goal will be based on your health and your age. Lifestyle changes, such as eating healthy and being active, are always important to help lower blood pressure. You might also take medicine to reach your blood pressure goal.  Follow-up care is a key part of your treatment and safety. Be sure to make and go to all appointments, and call your doctor if you are having problems. It's also a good idea to know your test results and keep a list of the medicines you take. How can you care for yourself at home? Medical treatment  · If you stop taking your medicine, your blood pressure will go back up. You may take one or more types of medicine to lower your blood pressure. Be safe with medicines. Take your medicine exactly as prescribed. Call your doctor if you think you are having a problem with your medicine. · Talk to your doctor before you start taking aspirin every day. Aspirin can help certain people lower their risk of a heart attack or stroke. But taking aspirin isn't right for everyone, because it can cause serious bleeding. · See your doctor regularly. You may need to see the doctor more often at first or until your blood pressure comes down. · If you are taking blood pressure medicine, talk to your doctor before you take decongestants or anti-inflammatory medicine, such as ibuprofen.  Some of these medicines can raise blood pressure. · Learn how to check your blood pressure at home. Lifestyle changes  · Stay at a healthy weight. This is especially important if you put on weight around the waist. Losing even 10 pounds can help you lower your blood pressure. · If your doctor recommends it, get more exercise. Walking is a good choice. Bit by bit, increase the amount you walk every day. Try for at least 30 minutes on most days of the week. You also may want to swim, bike, or do other activities. · Avoid or limit alcohol. Talk to your doctor about whether you can drink any alcohol. · Try to limit how much sodium you eat to less than 2,300 milligrams (mg) a day. Your doctor may ask you to try to eat less than 1,500 mg a day. · Eat plenty of fruits (such as bananas and oranges), vegetables, legumes, whole grains, and low-fat dairy products. · Lower the amount of saturated fat in your diet. Saturated fat is found in animal products such as milk, cheese, and meat. Limiting these foods may help you lose weight and also lower your risk for heart disease. · Do not smoke. Smoking increases your risk for heart attack and stroke. If you need help quitting, talk to your doctor about stop-smoking programs and medicines. These can increase your chances of quitting for good. When should you call for help? Call 911 anytime you think you may need emergency care. This may mean having symptoms that suggest that your blood pressure is causing a serious heart or blood vessel problem. Your blood pressure may be over 180/120.   For example, call 911 if:    · You have symptoms of a heart attack. These may include:  ? Chest pain or pressure, or a strange feeling in the chest.  ? Sweating. ? Shortness of breath. ? Nausea or vomiting. ? Pain, pressure, or a strange feeling in the back, neck, jaw, or upper belly or in one or both shoulders or arms. ? Lightheadedness or sudden weakness.   ? A fast or irregular heartbeat.     · You have symptoms of a stroke. These may include:  ? Sudden numbness, tingling, weakness, or loss of movement in your face, arm, or leg, especially on only one side of your body. ? Sudden vision changes. ? Sudden trouble speaking. ? Sudden confusion or trouble understanding simple statements. ? Sudden problems with walking or balance. ? A sudden, severe headache that is different from past headaches.     · You have severe back or belly pain.    Do not wait until your blood pressure comes down on its own. Get help right away.   Call your doctor now or seek immediate care if:    · Your blood pressure is much higher than normal (such as 180/120 or higher), but you don't have symptoms.     · You think high blood pressure is causing symptoms, such as:  ? Severe headache.  ? Blurry vision.    Watch closely for changes in your health, and be sure to contact your doctor if:    · Your blood pressure measures higher than your doctor recommends at least 2 times. That means the top number is higher or the bottom number is higher, or both.     · You think you may be having side effects from your blood pressure medicine. Where can you learn more? Go to http://dariusz-stanley.info/. Enter O269 in the search box to learn more about \"High Blood Pressure: Care Instructions. \"  Current as of: July 22, 2018  Content Version: 11.9  © 1922-8834 [employer]. Care instructions adapted under license by [employer] (which disclaims liability or warranty for this information). If you have questions about a medical condition or this instruction, always ask your healthcare professional. [place] [address] any warranty or liability for your use of this information.

## 2019-03-18 NOTE — PROGRESS NOTES
Patient here for f/u on his anxiety no concerns. 1. Have you been to the ER, urgent care clinic since your last visit? Hospitalized since your last visit? No  2. Have you seen or consulted any other health care providers outside of the [address] [place] [address] since your last visit? Include any pap smears or colon screening. No    Medication reconciliation has been completed with patient. Care team discussed/updated as well as pharmacy. Health Maintenance reviewed - Has HM due will discuss with provider.

## 2019-03-18 NOTE — PROGRESS NOTES
Domi Vo is a 69 y.o. male  presents for follow up. No new complaints. No chest pains or SOB    No Known Allergies  Outpatient Medications Marked as Taking for the 3/18/19 encounter (Office Visit) with Palak Gonzalez MD   Medication Sig Dispense Refill    atenolol (TENORMIN) 100 mg tablet TAKE 1 TABLET BY MOUTH EVERY DAY. 90 Tab 0    ALPRAZolam (XANAX) 0.5 mg tablet Take 1 Tab by mouth three (3) times daily as needed for Anxiety. Max Daily Amount: 1.5 mg. 90 Tab 1    venlafaxine-SR (EFFEXOR-XR) 150 mg capsule TAKE 1 CAPSULE BY MOUTH DAILY 90 Cap 0    metFORMIN (GLUCOPHAGE) 500 mg tablet TAKE 2 TABLETS BY MOUTH TWICE DAILY WITH MEALS 360 Tab 0    glipiZIDE SR (GLUCOTROL XL) 10 mg CR tablet TAKE 1 TABLET BY MOUTH EVERY DAY 90 Tab 0    fenofibrate (LOFIBRA) 160 mg tablet TAKE 1 TABLET BY MOUTH EVERY DAY WITH A MEAL 90 Tab 0    benazepril (LOTENSIN) 40 mg tablet TAKE 1 TABLET BY MOUTH EVERY DAY 90 Tab 0    simvastatin (ZOCOR) 40 mg tablet Take 1 Tab by mouth nightly. 90 Tab 0    aspirin delayed-release 81 mg tablet Take  by mouth daily.        Patient Active Problem List   Diagnosis Code    Essential hypertension I10    Controlled type 2 diabetes mellitus without complication, without long-term current use of insulin (HCC) E11.9    Hyperlipidemia E78.5    Chronic left shoulder pain M25.512, G89.29    Pain management contract agreement Z02.89    ACP (advance care planning) Z71.89    Type 2 diabetes mellitus with nephropathy (HCC) E11.21    Recurrent depression ([facility] F33.9    TACHO (generalized anxiety disorder) F41.1    MDD (major depressive disorder), recurrent episode, moderate ([facility]) F33.1    Palpitations R00.2     Past Medical History:   Diagnosis Date    Depression     Diabetes ([facility]     Hypercholesterolemia     Hypertension      Social History     Socioeconomic History    Marital status:      Spouse name: Not on file    Number of children: Not on file    Years of education: Not on file    Highest education level: Not on file   Tobacco Use    Smoking status: Current Every Day Smoker     Packs/day: 0.50     Years: 55.00     Pack years: 27.50     Types: Cigarettes    Smokeless tobacco: Never Used   Substance and Sexual Activity    Alcohol use: No     Family History   Problem Relation Age of Onset    Emphysema Mother     Hypertension Father     Heart Attack Father     Emphysema Paternal Grandmother         Review of Systems   Constitutional: Negative for chills, fever, malaise/fatigue and weight loss. Eyes: Negative for blurred vision. Respiratory: Negative for shortness of breath and wheezing. Cardiovascular: Negative for chest pain. Gastrointestinal: Negative for nausea and vomiting. Musculoskeletal: Negative for myalgias. Skin: Negative for rash. Neurological: Negative for weakness. Vitals:    03/18/19 1518   BP: 134/80   Pulse: 72   Resp: 12   Temp: 98.2 °F (36.8 °C)   TempSrc: Oral   SpO2: 98%   Weight: 196 lb 9.6 oz (89.2 kg)   Height: 5' 8\" (1.727 m)   PainSc:   0 - No pain       Physical Exam   Constitutional: He is oriented to person, place, and time and well-developed, well-nourished, and in no distress. Neck: Normal range of motion. Neck supple. No thyromegaly present. Cardiovascular: Normal rate, regular rhythm and normal heart sounds. Pulmonary/Chest: Effort normal and breath sounds normal.   Musculoskeletal: Normal range of motion. Neurological: He is alert and oriented to person, place, and time. Skin: Skin is warm and dry. Psychiatric: Mood, memory, affect and judgment normal.   Nursing note and vitals reviewed. Assessment/Plan      ICD-10-CM ICD-9-CM    1. Essential hypertension O72 136.8 METABOLIC PANEL, COMPREHENSIVE   2. Other hyperlipidemia E78.49 272.4 LIPID PANEL   3. Recurrent depression (HCC) F33.9 296.30    4. Type 2 diabetes mellitus with nephropathy (HCC) E11.21 250.40 HEMOGLOBIN A1C WITH EAG     583.81    5. Prostate cancer screening Z12.5 V76.44 PSA SCREENING (SCREENING)     I have discussed the diagnosis with the patient and the intended plan of care as seen in the above orders. The patient has received an after-visit summary and questions were answered concerning future plans. I have discussed medication, side effects, and warnings with the patient in detail. The patient verbalized understanding and is in agreement with the plan of care. The patient will contact the office with any additional concerns.       Follow-up Disposition:  Return if symptoms worsen or fail to improve.  lab results and schedule of future lab studies reviewed with patient    Damien Francis MD

## 2019-04-03 DIAGNOSIS — E78.5 HYPERLIPIDEMIA, UNSPECIFIED HYPERLIPIDEMIA TYPE: ICD-10-CM

## 2019-04-03 DIAGNOSIS — E78.00 PURE HYPERCHOLESTEROLEMIA: ICD-10-CM

## 2019-04-03 DIAGNOSIS — I10 ESSENTIAL HYPERTENSION: ICD-10-CM

## 2019-04-03 RX ORDER — GLIPIZIDE 10 MG/1
TABLET, FILM COATED, EXTENDED RELEASE ORAL
Qty: 90 TAB | Refills: 0 | Status: SHIPPED | OUTPATIENT
Start: 2019-04-03 | End: 2019-05-10 | Stop reason: SDUPTHER

## 2019-04-03 RX ORDER — SIMVASTATIN 40 MG/1
TABLET, FILM COATED ORAL
Qty: 90 TAB | Refills: 0 | Status: SHIPPED | OUTPATIENT
Start: 2019-04-03 | End: 2019-05-10 | Stop reason: SDUPTHER

## 2019-04-03 RX ORDER — BENAZEPRIL HYDROCHLORIDE 40 MG/1
TABLET ORAL
Qty: 90 TAB | Refills: 0 | Status: SHIPPED | OUTPATIENT
Start: 2019-04-03 | End: 2019-06-30 | Stop reason: SDUPTHER

## 2019-04-03 RX ORDER — FENOFIBRATE 160 MG/1
TABLET ORAL
Qty: 90 TAB | Refills: 0 | Status: SHIPPED | OUTPATIENT
Start: 2019-04-03 | End: 2019-05-10 | Stop reason: SDUPTHER

## 2019-04-17 DIAGNOSIS — F41.1 GAD (GENERALIZED ANXIETY DISORDER): ICD-10-CM

## 2019-04-18 RX ORDER — ALPRAZOLAM 0.5 MG/1
0.5 TABLET ORAL
Qty: 90 TAB | Refills: 1 | Status: SHIPPED | OUTPATIENT
Start: 2019-04-18 | End: 2019-06-12 | Stop reason: SDUPTHER

## 2019-05-10 DIAGNOSIS — E78.00 PURE HYPERCHOLESTEROLEMIA: ICD-10-CM

## 2019-05-10 DIAGNOSIS — E78.5 HYPERLIPIDEMIA, UNSPECIFIED HYPERLIPIDEMIA TYPE: ICD-10-CM

## 2019-05-13 RX ORDER — GLIPIZIDE 10 MG/1
10 TABLET, FILM COATED, EXTENDED RELEASE ORAL DAILY
Qty: 90 TAB | Refills: 0 | Status: SHIPPED | OUTPATIENT
Start: 2019-05-13 | End: 2019-08-02 | Stop reason: SDUPTHER

## 2019-05-13 RX ORDER — FENOFIBRATE 160 MG/1
160 TABLET ORAL DAILY
Qty: 90 TAB | Refills: 0 | Status: SHIPPED | OUTPATIENT
Start: 2019-05-13 | End: 2019-08-02 | Stop reason: SDUPTHER

## 2019-05-13 RX ORDER — SIMVASTATIN 40 MG/1
40 TABLET, FILM COATED ORAL
Qty: 90 TAB | Refills: 0 | Status: SHIPPED | OUTPATIENT
Start: 2019-05-13

## 2019-05-13 RX ORDER — ATENOLOL 100 MG/1
100 TABLET ORAL DAILY
Qty: 90 TAB | Refills: 0 | Status: SHIPPED | OUTPATIENT
Start: 2019-05-13 | End: 2019-08-02 | Stop reason: SDUPTHER

## 2019-06-12 DIAGNOSIS — F41.1 GAD (GENERALIZED ANXIETY DISORDER): ICD-10-CM

## 2019-06-12 RX ORDER — ALPRAZOLAM 0.5 MG/1
TABLET ORAL
Qty: 90 TAB | Refills: 0 | Status: SHIPPED | OUTPATIENT
Start: 2019-06-12 | End: 2019-07-09 | Stop reason: SDUPTHER

## 2019-06-12 RX ORDER — ALPRAZOLAM 0.5 MG/1
0.5 TABLET ORAL
Qty: 90 TAB | Refills: 1 | Status: CANCELLED | OUTPATIENT
Start: 2019-06-12

## 2019-06-14 RX ORDER — OMEPRAZOLE 40 MG/1
40 CAPSULE, DELAYED RELEASE ORAL DAILY
Qty: 30 CAP | Refills: 0 | Status: SHIPPED | OUTPATIENT
Start: 2019-06-14

## 2019-06-14 NOTE — TELEPHONE ENCOUNTER
[facility] sends a request for Omeprazole. The pt. was last seen 10/24/18. It is noted that he hasn't had a CT done that was ordered. The pt. Is left a message asking him to schedule the CT and then contact us for a follow up appt. .    Dr. Karen Jorgensen a 30 day supply with [facility] for this request.

## 2019-06-17 ENCOUNTER — OFFICE VISIT (OUTPATIENT)
Dept: FAMILY MEDICINE CLINIC | Age: 74
End: 2019-06-17

## 2019-06-17 VITALS
OXYGEN SATURATION: 97 % | SYSTOLIC BLOOD PRESSURE: 124 MMHG | HEIGHT: 68 IN | RESPIRATION RATE: 14 BRPM | HEART RATE: 80 BPM | WEIGHT: 195 LBS | TEMPERATURE: 97.8 F | BODY MASS INDEX: 29.55 KG/M2 | DIASTOLIC BLOOD PRESSURE: 84 MMHG

## 2019-06-17 DIAGNOSIS — E11.9 CONTROLLED TYPE 2 DIABETES MELLITUS WITHOUT COMPLICATION, WITHOUT LONG-TERM CURRENT USE OF INSULIN (HCC): Primary | ICD-10-CM

## 2019-06-17 DIAGNOSIS — I10 ESSENTIAL HYPERTENSION: ICD-10-CM

## 2019-06-17 DIAGNOSIS — L98.9 SKIN LESION: ICD-10-CM

## 2019-06-17 NOTE — PROGRESS NOTES
Jana Joyner presents today for   Chief Complaint   Patient presents with    Diabetes     3 month follow up     Hypertension    Anxiety              Depression Screening:  3 most recent PHQ Screens 7/7/2017   PHQ Not Done -   Little interest or pleasure in doing things Nearly every day   Feeling down, depressed, irritable, or hopeless Nearly every day   Total Score PHQ 2 6   Trouble falling or staying asleep, or sleeping too much Nearly every day   Feeling tired or having little energy Several days   Poor appetite, weight loss, or overeating More than half the days   Feeling bad about yourself - or that you are a failure or have let yourself or your family down Not at all   Trouble concentrating on things such as school, work, reading, or watching TV Nearly every day   Moving or speaking so slowly that other people could have noticed; or the opposite being so fidgety that others notice Nearly every day   Thoughts of being better off dead, or hurting yourself in some way Nearly every day   PHQ 9 Score 21   How difficult have these problems made it for you to do your work, take care of your home and get along with others Somewhat difficult       Learning Assessment:  No flowsheet data found. Abuse Screening:  Abuse Screening Questionnaire 2/18/2019   Do you ever feel afraid of your partner? N   Are you in a relationship with someone who physically or mentally threatens you? N   Is it safe for you to go home? Y       Fall Risk  Fall Risk Assessment, last 12 mths 2/18/2019   Able to walk? Yes   Fall in past 12 months? No           Coordination of Care:  1. Have you been to the ER, urgent care clinic since your last visit? Hospitalized since your last visit? no    2. Have you seen or consulted any other health care providers outside of the [address] [place] [address] since your last visit? Include any pap smears or colon screening.  no

## 2019-06-17 NOTE — PROGRESS NOTES
Norman Hull is a 69 y.o. male  presents for follow up of meds. He may need refills. No ideas of suicide or homicide. He also states that he has scalp lesion that he wants evaluated by another Dermatologist.      No Known Allergies  Outpatient Medications Marked as Taking for the 6/17/19 encounter (Office Visit) with Víctor Mazariegos MD   Medication Sig Dispense Refill    omeprazole (PRILOSEC) 40 mg capsule Take 1 Cap by mouth daily. 30 Cap 0    ALPRAZolam (XANAX) 0.5 mg tablet TAKE 1 TABLET BY MOUTH THREE TIMES DAILY AS NEEDED FOR ANXIETY. MAX DAILY AMOUNT: 1.5 MG 90 Tab 0    atenolol (TENORMIN) 100 mg tablet Take 1 Tab by mouth daily. 90 Tab 0    fenofibrate (LOFIBRA) 160 mg tablet Take 1 Tab by mouth daily. 90 Tab 0    glipiZIDE SR (GLUCOTROL XL) 10 mg CR tablet Take 1 Tab by mouth daily. 90 Tab 0    simvastatin (ZOCOR) 40 mg tablet Take 1 Tab by mouth nightly. 90 Tab 0    benazepril (LOTENSIN) 40 mg tablet TAKE 1 TABLET BY MOUTH EVERY DAY 90 Tab 0    venlafaxine-SR (EFFEXOR-XR) 150 mg capsule TAKE 1 CAPSULE BY MOUTH DAILY 90 Cap 0    metFORMIN (GLUCOPHAGE) 500 mg tablet TAKE 2 TABLETS BY MOUTH TWICE DAILY WITH MEALS 360 Tab 0    sildenafil citrate (VIAGRA) 50 mg tablet Take 1 Tab by mouth as needed. 8 Tab 3    simvastatin (ZOCOR) 40 mg tablet Take 1 Tab by mouth nightly. 90 Tab 0    aspirin delayed-release 81 mg tablet Take  by mouth daily.        Patient Active Problem List   Diagnosis Code    Essential hypertension I10    Controlled type 2 diabetes mellitus without complication, without long-term current use of insulin ([facility]) E11.9    Hyperlipidemia E78.5    Chronic left shoulder pain M25.512, G89.29    Pain management contract agreement Z02.89    ACP (advance care planning) Z71.89    Type 2 diabetes mellitus with nephropathy ([facility]) E11.21    Recurrent depression ([facility] [address] F33.9    TACHO (generalized anxiety disorder) F41.1    Palpitations R00.2     Past Medical History:   Diagnosis Date    Depression     [facility]     Hypercholesterolemia     Hypertension      Social History     Socioeconomic History    Marital status:      Spouse name: Not on file    Number of children: Not on file    Years of education: Not on file    Highest education level: Not on file   Tobacco Use    Smoking status: Current Every Day Smoker     Packs/day: 0.50     Years: 55.00     Pack years: 27.50     Types: Cigarettes    Smokeless tobacco: Never Used   Substance and Sexual Activity    Alcohol use: No     Family History   Problem Relation Age of Onset    Emphysema Mother     Hypertension Father     Heart Attack Father     Emphysema Paternal Grandmother         Review of Systems   Constitutional: Negative for chills, fever, malaise/fatigue and weight loss. Eyes: Negative for blurred vision. Respiratory: Negative for shortness of breath and wheezing. Cardiovascular: Negative for chest pain. Gastrointestinal: Negative for nausea and vomiting. Musculoskeletal: Negative for myalgias. Skin: Negative for rash. Neurological: Negative for weakness. Psychiatric/Behavioral: Negative for depression, hallucinations, memory loss, substance abuse and suicidal ideas. The patient is not nervous/anxious and does not have insomnia. Vitals:    06/17/19 1359   BP: 124/84   Pulse: 80   Resp: 14   Temp: 97.8 °F (36.6 °C)   TempSrc: Oral   SpO2: 97%   Weight: 195 lb (88.5 kg)   Height: 5' 8\" (1.727 m)   PainSc:   0 - No pain       Physical Exam   Constitutional: He is oriented to person, place, and time and well-developed, well-nourished, and in no distress. Neck: Normal range of motion. Neck supple. No thyromegaly present. Cardiovascular: Normal rate, regular rhythm and normal heart sounds. Pulmonary/Chest: Effort normal and breath sounds normal.   Musculoskeletal: Normal range of motion. Neurological: He is alert and oriented to person, place, and time.  Gait normal.   Skin: Skin is warm and dry.   Macula papula scalp lesion 1 X 1.25 in dark red lesion. Slightly irregular border. Psychiatric: Mood, memory, affect and judgment normal.   Nursing note and vitals reviewed. Assessment/Plan      ICD-10-CM ICD-9-CM    1. Controlled type 2 diabetes mellitus without complication, without long-term current use of insulin (HCC) E11.9 250.00    2. Essential hypertension I10 401.9    3. Skin lesion L98.9 709.9 REFERRAL TO DERMATOLOGY     I have discussed the diagnosis with the patient and the intended plan of care as seen in the above orders. The patient has received an after-visit summary and questions were answered concerning future plans. I have discussed medication, side effects, and warnings with the patient in detail. The patient verbalized understanding and is in agreement with the plan of care. The patient will contact the office with any additional concerns.       lab results and schedule of future lab studies reviewed with patient    Josse Bella MD

## 2019-06-17 NOTE — PATIENT INSTRUCTIONS
Diabetes Foot Health: Care Instructions Your Care Instructions When you have diabetes, your feet need extra care and attention. Diabetes can damage the nerve endings and blood vessels in your feet, making you less likely to notice when your feet are injured. Diabetes also limits your body's ability to fight infection and get blood to areas that need it. If you get a minor foot injury, it could become an ulcer or a serious infection. With good foot care, you can prevent most of these problems. Caring for your feet can be quick and easy. Most of the care can be done when you are bathing or getting ready for bed. Follow-up care is a key part of your treatment and safety. Be sure to make and go to all appointments, and call your doctor if you are having problems. It's also a good idea to know your test results and keep a list of the medicines you take. How can you care for yourself at home? · Keep your blood sugar close to normal by watching what and how much you eat, monitoring blood sugar, taking medicines if prescribed, and getting regular exercise. · Do not smoke. Smoking affects blood flow and can make foot problems worse. If you need help quitting, talk to your doctor about stop-smoking programs and medicines. These can increase your chances of quitting for good. · Eat a diet that is low in fats. High fat intake can cause fat to build up in your blood vessels and decrease blood flow. · Inspect your feet daily for blisters, cuts, cracks, or sores. If you cannot see well, use a mirror or have someone help you. · Take care of your feet: 
? Wash your feet every day. Use warm (not hot) water. Check the water temperature with your wrists or other part of your body, not your feet. ? Dry your feet well. Pat them dry. Do not rub the skin on your feet too hard. Dry well between your toes. If the skin on your feet stays moist, bacteria or a fungus can grow, which can lead to infection. ? Keep your skin soft. Use moisturizing skin cream to keep the skin on your feet soft and prevent calluses and cracks. But do not put the cream between your toes, and stop using any cream that causes a rash. ? Clean underneath your toenails carefully. Do not use a sharp object to clean underneath your toenails. Use the blunt end of a nail file or other rounded tool. ? Trim and file your toenails straight across to prevent ingrown toenails. Use a nail clipper, not scissors. Use an emery board to smooth the edges. · Change socks daily. Socks without seams are best, because seams often rub the feet. You can find socks for people with diabetes from specialty catalogs. · Look inside your shoes every day for things like gravel or torn linings, which could cause blisters or sores. · Buy shoes that fit well: 
? Look for shoes that have plenty of space around the toes. This helps prevent bunions and blisters. ? Try on shoes while wearing the kind of socks you will usually wear with the shoes. ? Avoid plastic shoes. They may rub your feet and cause blisters. Good shoes should be made of materials that are flexible and breathable, such as leather or cloth. ? Break in new shoes slowly by wearing them for no more than an hour a day for several days. Take extra time to check your feet for red areas, blisters, or other problems after you wear new shoes. · Do not go barefoot. Do not wear sandals, and do not wear shoes with very thin soles. Thin soles are easy to puncture. They also do not protect your feet from hot pavement or cold weather. · Have your doctor check your feet during each visit. If you have a foot problem, see your doctor. Do not try to treat an early foot problem at home. Home remedies or treatments that you can buy without a prescription (such as corn removers) can be harmful. · Always get early treatment for foot problems. A minor irritation can lead to a major problem if not properly cared for early. When should you call for help? Call your doctor now or seek immediate medical care if: 
  · You have a foot sore, an ulcer or break in the skin that is not healing after 4 days, bleeding corns or calluses, or an ingrown toenail.  
  · You have blue or black areas, which can mean bruising or blood flow problems.  
  · You have peeling skin or tiny blisters between your toes or cracking or oozing of the skin.  
  · You have a fever for more than 24 hours and a foot sore.  
  · You have new numbness or tingling in your feet that does not go away after you move your feet or change positions.  
  · You have unexplained or unusual swelling of the foot or ankle.  
 Watch closely for changes in your health, and be sure to contact your doctor if: 
  · You cannot do proper foot care. Where can you learn more? Go to http://dariusz-stanley.info/. Enter A739 in the search box to learn more about \"Diabetes Foot Health: Care Instructions. \" Current as of: July 25, 2018 Content Version: 11.9 © 9945-0252 [employer]. Care instructions adapted under license by [employer] (which disclaims liability or warranty for this information). If you have questions about a medical condition or this instruction, always ask your healthcare professional. [address] any warranty or liability for your use of this information.

## 2019-06-18 DIAGNOSIS — F41.1 GAD (GENERALIZED ANXIETY DISORDER): ICD-10-CM

## 2019-06-18 DIAGNOSIS — F33.1 MDD (MAJOR DEPRESSIVE DISORDER), RECURRENT EPISODE, MODERATE (HCC): ICD-10-CM

## 2019-06-19 RX ORDER — MIRTAZAPINE 30 MG/1
TABLET, FILM COATED ORAL
Qty: 90 TAB | Refills: 0 | Status: SHIPPED | OUTPATIENT
Start: 2019-06-19 | End: 2019-08-02 | Stop reason: SDUPTHER

## 2019-06-30 DIAGNOSIS — E11.9 CONTROLLED TYPE 2 DIABETES MELLITUS WITHOUT COMPLICATION, WITHOUT LONG-TERM CURRENT USE OF INSULIN (HCC): ICD-10-CM

## 2019-06-30 DIAGNOSIS — I10 ESSENTIAL HYPERTENSION: ICD-10-CM

## 2019-07-01 RX ORDER — METFORMIN HYDROCHLORIDE 500 MG/1
TABLET ORAL
Qty: 360 TAB | Refills: 0 | Status: SHIPPED | OUTPATIENT
Start: 2019-07-01 | End: 2019-08-02 | Stop reason: SDUPTHER

## 2019-07-01 RX ORDER — BENAZEPRIL HYDROCHLORIDE 40 MG/1
TABLET ORAL
Qty: 90 TAB | Refills: 0 | Status: SHIPPED | OUTPATIENT
Start: 2019-07-01 | End: 2019-08-02 | Stop reason: SDUPTHER

## 2019-07-01 RX ORDER — VENLAFAXINE HYDROCHLORIDE 150 MG/1
CAPSULE, EXTENDED RELEASE ORAL
Qty: 90 CAP | Refills: 0 | Status: SHIPPED | OUTPATIENT
Start: 2019-07-01 | End: 2019-08-02 | Stop reason: SDUPTHER

## 2019-07-09 DIAGNOSIS — F41.1 GAD (GENERALIZED ANXIETY DISORDER): ICD-10-CM

## 2019-07-09 NOTE — TELEPHONE ENCOUNTER
This pharmacy faxed over request for the following prescriptions to be filled:    Medication requested :   Requested Prescriptions     Pending Prescriptions Disp Refills    ALPRAZolam (XANAX) 0.5 mg tablet 90 Tab 0     Sig: TAKE 1 TABLET BY MOUTH THREE TIMES DAILY AS NEEDED FOR ANXIETY.  MAX DAILY AMOUNT: 1.5 MG       PCP: Dr. Chris Brito or Print: Pharmacy   Mail order or Local pharmacy: Mail order--[facility]    Scheduled appointment if not seen by current providers in office: last appointment 6/17/19, next appt 9/16/19

## 2019-07-10 RX ORDER — ALPRAZOLAM 0.5 MG/1
TABLET ORAL
Qty: 90 TAB | Refills: 0 | Status: SHIPPED | OUTPATIENT
Start: 2019-07-10 | End: 2019-08-12 | Stop reason: SDUPTHER

## 2019-07-11 NOTE — TELEPHONE ENCOUNTER
Left a voicemail informing patient that it went to his mail service for a 90 day supply.  7/11/19 1:22pm SH

## 2019-08-02 DIAGNOSIS — F41.1 GAD (GENERALIZED ANXIETY DISORDER): ICD-10-CM

## 2019-08-02 DIAGNOSIS — I10 ESSENTIAL HYPERTENSION: ICD-10-CM

## 2019-08-02 DIAGNOSIS — E78.00 PURE HYPERCHOLESTEROLEMIA: ICD-10-CM

## 2019-08-02 DIAGNOSIS — E11.9 CONTROLLED TYPE 2 DIABETES MELLITUS WITHOUT COMPLICATION, WITHOUT LONG-TERM CURRENT USE OF INSULIN (HCC): ICD-10-CM

## 2019-08-02 DIAGNOSIS — E78.5 HYPERLIPIDEMIA, UNSPECIFIED HYPERLIPIDEMIA TYPE: ICD-10-CM

## 2019-08-02 DIAGNOSIS — F33.1 MDD (MAJOR DEPRESSIVE DISORDER), RECURRENT EPISODE, MODERATE (HCC): ICD-10-CM

## 2019-08-02 RX ORDER — FENOFIBRATE 160 MG/1
160 TABLET ORAL DAILY
Qty: 90 TAB | Refills: 0 | Status: SHIPPED | OUTPATIENT
Start: 2019-08-02 | End: 2019-09-04 | Stop reason: SDUPTHER

## 2019-08-02 RX ORDER — ATENOLOL 100 MG/1
100 TABLET ORAL DAILY
Qty: 90 TAB | Refills: 0 | Status: SHIPPED | OUTPATIENT
Start: 2019-08-02 | End: 2019-09-05 | Stop reason: SDUPTHER

## 2019-08-02 RX ORDER — BENAZEPRIL HYDROCHLORIDE 40 MG/1
TABLET ORAL
Qty: 90 TAB | Refills: 0 | Status: SHIPPED | OUTPATIENT
Start: 2019-08-02 | End: 2019-10-05 | Stop reason: SDUPTHER

## 2019-08-02 RX ORDER — SIMVASTATIN 40 MG/1
40 TABLET, FILM COATED ORAL
Qty: 90 TAB | Refills: 0 | Status: SHIPPED | OUTPATIENT
Start: 2019-08-02 | End: 2019-09-04 | Stop reason: SDUPTHER

## 2019-08-02 RX ORDER — MIRTAZAPINE 30 MG/1
TABLET, FILM COATED ORAL
Qty: 90 TAB | Refills: 0 | Status: SHIPPED | OUTPATIENT
Start: 2019-08-02 | End: 2019-09-23 | Stop reason: SDUPTHER

## 2019-08-02 RX ORDER — VENLAFAXINE HYDROCHLORIDE 150 MG/1
CAPSULE, EXTENDED RELEASE ORAL
Qty: 90 CAP | Refills: 0 | Status: SHIPPED | OUTPATIENT
Start: 2019-08-02 | End: 2019-11-06 | Stop reason: SDUPTHER

## 2019-08-02 RX ORDER — GLIPIZIDE 10 MG/1
10 TABLET, FILM COATED, EXTENDED RELEASE ORAL DAILY
Qty: 90 TAB | Refills: 0 | Status: SHIPPED | OUTPATIENT
Start: 2019-08-02 | End: 2019-09-04 | Stop reason: SDUPTHER

## 2019-08-02 RX ORDER — METFORMIN HYDROCHLORIDE 500 MG/1
TABLET ORAL
Qty: 360 TAB | Refills: 0 | Status: SHIPPED | OUTPATIENT
Start: 2019-08-02 | End: 2019-10-05 | Stop reason: SDUPTHER

## 2019-08-02 NOTE — TELEPHONE ENCOUNTER
This pharmacy faxed over request for the following prescriptions to be filled: The patient also called, he is switching pharmacies from [facility] to [facility] 90 day supply. Medication requested :   Requested Prescriptions     Pending Prescriptions Disp Refills    benazepril (LOTENSIN) 40 mg tablet 90 Tab 0     Sig: TAKE 1 TABLET BY MOUTH EVERY DAY    venlafaxine-SR (EFFEXOR-XR) 150 mg capsule 90 Cap 0     Sig: TAKE 1 CAPSULE BY MOUTH DAILY    simvastatin (ZOCOR) 40 mg tablet 90 Tab 0     Sig: Take 1 Tab by mouth nightly.  fenofibrate (LOFIBRA) 160 mg tablet 90 Tab 0     Sig: Take 1 Tab by mouth daily.  metFORMIN (GLUCOPHAGE) 500 mg tablet 360 Tab 0     Sig: TAKE 2 TABLETS BY MOUTH TWICE DAILY WITH MEALS    atenolol (TENORMIN) 100 mg tablet 90 Tab 0     Sig: Take 1 Tab by mouth daily.  glipiZIDE SR (GLUCOTROL XL) 10 mg CR tablet 90 Tab 0     Sig: Take 1 Tab by mouth daily.     mirtazapine (REMERON) 30 mg tablet 90 Tab 0     Sig: TAKE 1 TABLET BY MOUTH EVERY NIGHT       PCP: Dr. Brie Chance MD  Pharmacy or Print: Pharmacy  Mail order or Local pharmacy: New Mail Order---  [facility]    Scheduled appointment if not seen by current providers in office: Last appointment 6-17-19, next appointment 9-16-19

## 2019-08-12 DIAGNOSIS — F41.1 GAD (GENERALIZED ANXIETY DISORDER): ICD-10-CM

## 2019-08-12 RX ORDER — ALPRAZOLAM 0.5 MG/1
TABLET ORAL
Qty: 90 TAB | Refills: 0 | Status: SHIPPED | OUTPATIENT
Start: 2019-08-12 | End: 2019-09-10 | Stop reason: SDUPTHER

## 2019-09-04 DIAGNOSIS — E78.00 PURE HYPERCHOLESTEROLEMIA: ICD-10-CM

## 2019-09-04 DIAGNOSIS — E78.5 HYPERLIPIDEMIA, UNSPECIFIED HYPERLIPIDEMIA TYPE: ICD-10-CM

## 2019-09-05 RX ORDER — SIMVASTATIN 40 MG/1
TABLET, FILM COATED ORAL
Qty: 90 TAB | Refills: 0 | Status: SHIPPED | OUTPATIENT
Start: 2019-09-05 | End: 2020-07-13 | Stop reason: SDUPTHER

## 2019-09-05 RX ORDER — GLIPIZIDE 10 MG/1
TABLET, FILM COATED, EXTENDED RELEASE ORAL
Qty: 90 TAB | Refills: 0 | Status: SHIPPED | OUTPATIENT
Start: 2019-09-05 | End: 2019-10-05 | Stop reason: SDUPTHER

## 2019-09-05 RX ORDER — FENOFIBRATE 160 MG/1
TABLET ORAL
Qty: 90 TAB | Refills: 0 | Status: SHIPPED | OUTPATIENT
Start: 2019-09-05 | End: 2019-10-05 | Stop reason: SDUPTHER

## 2019-09-05 RX ORDER — ATENOLOL 100 MG/1
TABLET ORAL
Qty: 90 TAB | Refills: 0 | Status: SHIPPED | OUTPATIENT
Start: 2019-09-05 | End: 2019-10-05 | Stop reason: SDUPTHER

## 2019-09-10 DIAGNOSIS — F41.1 GAD (GENERALIZED ANXIETY DISORDER): ICD-10-CM

## 2019-09-11 RX ORDER — ALPRAZOLAM 0.5 MG/1
TABLET ORAL
Qty: 90 TAB | Refills: 0 | Status: SHIPPED | OUTPATIENT
Start: 2019-09-11 | End: 2019-10-14 | Stop reason: SDUPTHER

## 2019-09-16 ENCOUNTER — OFFICE VISIT (OUTPATIENT)
Dept: FAMILY MEDICINE CLINIC | Age: 74
End: 2019-09-16

## 2019-09-16 VITALS
SYSTOLIC BLOOD PRESSURE: 122 MMHG | WEIGHT: 194 LBS | DIASTOLIC BLOOD PRESSURE: 74 MMHG | HEIGHT: 68 IN | BODY MASS INDEX: 29.4 KG/M2 | TEMPERATURE: 97.8 F | HEART RATE: 69 BPM | OXYGEN SATURATION: 98 % | RESPIRATION RATE: 12 BRPM

## 2019-09-16 DIAGNOSIS — E78.49 OTHER HYPERLIPIDEMIA: ICD-10-CM

## 2019-09-16 DIAGNOSIS — I10 ESSENTIAL HYPERTENSION: ICD-10-CM

## 2019-09-16 DIAGNOSIS — E11.9 CONTROLLED TYPE 2 DIABETES MELLITUS WITHOUT COMPLICATION, WITHOUT LONG-TERM CURRENT USE OF INSULIN (HCC): Primary | ICD-10-CM

## 2019-09-16 DIAGNOSIS — Z23 ENCOUNTER FOR IMMUNIZATION: ICD-10-CM

## 2019-09-16 LAB — HBA1C MFR BLD HPLC: 9.8 %

## 2019-09-16 NOTE — PROGRESS NOTES
Chief Complaint   Patient presents with    Diabetes     Pt in office for dm follow up. Also wants flu shot.

## 2019-09-16 NOTE — PROGRESS NOTES
Salma Crabtree is a 69 y.o. male  presents for follow up of DM lipids and HTN. No new complaints. No Known Allergies  Outpatient Medications Marked as Taking for the 9/16/19 encounter (Office Visit) with Lynn Weiner MD   Medication Sig Dispense Refill    ALPRAZolam (XANAX) 0.5 mg tablet TAKE 1 TABLET BY MOUTH THREE TIMES DAILY AS NEEDED FOR ANXIETY. MAX DAILY AMOUNT: 1.5 MG 90 Tab 0    simvastatin (ZOCOR) 40 mg tablet TAKE 1 TABLET BY MOUTH  NIGHTLY 90 Tab 0    glipiZIDE SR (GLUCOTROL XL) 10 mg CR tablet TAKE 1 TABLET BY MOUTH  DAILY 90 Tab 0    fenofibrate (LOFIBRA) 160 mg tablet TAKE 1 TABLET BY MOUTH  DAILY 90 Tab 0    atenolol (TENORMIN) 100 mg tablet TAKE 1 TABLET BY MOUTH  DAILY 90 Tab 0    benazepril (LOTENSIN) 40 mg tablet TAKE 1 TABLET BY MOUTH EVERY DAY 90 Tab 0    venlafaxine-SR (EFFEXOR-XR) 150 mg capsule TAKE 1 CAPSULE BY MOUTH DAILY 90 Cap 0    metFORMIN (GLUCOPHAGE) 500 mg tablet TAKE 2 TABLETS BY MOUTH TWICE DAILY WITH MEALS 360 Tab 0    mirtazapine (REMERON) 30 mg tablet TAKE 1 TABLET BY MOUTH EVERY NIGHT 90 Tab 0    omeprazole (PRILOSEC) 40 mg capsule Take 1 Cap by mouth daily. 30 Cap 0    simvastatin (ZOCOR) 40 mg tablet Take 1 Tab by mouth nightly. 90 Tab 0    sildenafil citrate (VIAGRA) 50 mg tablet Take 1 Tab by mouth as needed. 8 Tab 3    albuterol (PROVENTIL HFA, VENTOLIN HFA, PROAIR HFA) 90 mcg/actuation inhaler Take 2 Puffs by inhalation every four (4) hours as needed for Wheezing. 1 Inhaler 1    aspirin delayed-release 81 mg tablet Take  by mouth daily.        Patient Active Problem List   Diagnosis Code    Essential hypertension I10    Controlled type 2 diabetes mellitus without complication, without long-term current use of insulin ([facility]) E11.9    Hyperlipidemia E78.5    Chronic left shoulder pain M25.512, G89.29    Pain management contract agreement Z02.89    ACP (advance care planning) Z71.89    Type 2 diabetes mellitus with nephropathy ([facility] [address] E11.21    Recurrent depression (HCC) F33.9    TACHO (generalized anxiety disorder) F41.1    Palpitations R00.2     Past Medical History:   Diagnosis Date    Depression     Diabetes ([address]     Hypercholesterolemia     Hypertension      Social History     Socioeconomic History    Marital status:      Spouse name: Not on file    Number of children: Not on file    Years of education: Not on file    Highest education level: Not on file   Tobacco Use    Smoking status: Current Every Day Smoker     Packs/day: 0.50     Years: 55.00     Pack years: 27.50     Types: Cigarettes    Smokeless tobacco: Never Used   Substance and Sexual Activity    Alcohol use: No     Family History   Problem Relation Age of Onset    Emphysema Mother     Hypertension Father     Heart Attack Father     Emphysema Paternal Grandmother         Review of Systems   Constitutional: Negative for chills, fever, malaise/fatigue and weight loss. Eyes: Negative for blurred vision. Respiratory: Negative for shortness of breath and wheezing. Cardiovascular: Negative for chest pain. Gastrointestinal: Negative for nausea and vomiting. Musculoskeletal: Negative for myalgias. Skin: Negative for rash. Neurological: Negative for weakness. Psychiatric/Behavioral: Negative for depression, hallucinations, memory loss, substance abuse and suicidal ideas. The patient is not nervous/anxious and does not have insomnia. Vitals:    09/16/19 1324   BP: 122/74   Pulse: 69   Resp: 12   Temp: 97.8 °F (36.6 °C)   SpO2: 98%   Weight: 194 lb (88 kg)   Height: 5' 8\" (1.727 m)   PainSc:   0 - No pain       Physical Exam   Constitutional: He is oriented to person, place, and time and well-developed, well-nourished, and in no distress. Neck: Normal range of motion. Neck supple. No thyromegaly present. Cardiovascular: Normal rate, regular rhythm and normal heart sounds.    Pulmonary/Chest: Effort normal and breath sounds normal.   Musculoskeletal: Normal range of motion. Neurological: He is alert and oriented to person, place, and time. Skin: Skin is warm and dry. Psychiatric: Mood, memory, affect and judgment normal.   Nursing note and vitals reviewed. Assessment/Plan      ICD-10-CM ICD-9-CM    1. Controlled type 2 diabetes mellitus without complication, without long-term current use of insulin (HCC) E11.9 250.00 AMB POC HEMOGLOBIN A1C   2. Encounter for immunization Z23 V03.89 INFLUENZA VACCINE INACTIVATED (IIV), SUBUNIT, ADJUVANTED, IM      CANCELED: INFLUENZA VIRUS VACCINE, HIGH DOSE SEASONAL, PRESERVATIVE FREE   3. Essential hypertension I10 401.9    4. Other hyperlipidemia E78.49 272.4      I have discussed the diagnosis with the patient and the intended plan of care as seen in the above orders. The patient has received an after-visit summary and questions were answered concerning future plans. I have discussed medication, side effects, and warnings with the patient in detail. The patient verbalized understanding and is in agreement with the plan of care. The patient will contact the office with any additional concerns.     lab results and schedule of future lab studies reviewed with patient    Rosendo Aguilar MD

## 2019-09-16 NOTE — PATIENT INSTRUCTIONS
Diabetes Foot Health: Care Instructions  Your Care Instructions    When you have diabetes, your feet need extra care and attention. Diabetes can damage the nerve endings and blood vessels in your feet, making you less likely to notice when your feet are injured. Diabetes also limits your body's ability to fight infection and get blood to areas that need it. If you get a minor foot injury, it could become an ulcer or a serious infection. With good foot care, you can prevent most of these problems. Caring for your feet can be quick and easy. Most of the care can be done when you are bathing or getting ready for bed. Follow-up care is a key part of your treatment and safety. Be sure to make and go to all appointments, and call your doctor if you are having problems. It's also a good idea to know your test results and keep a list of the medicines you take. How can you care for yourself at home? · Keep your blood sugar close to normal by watching what and how much you eat, monitoring blood sugar, taking medicines if prescribed, and getting regular exercise. · Do not smoke. Smoking affects blood flow and can make foot problems worse. If you need help quitting, talk to your doctor about stop-smoking programs and medicines. These can increase your chances of quitting for good. · Eat a diet that is low in fats. High fat intake can cause fat to build up in your blood vessels and decrease blood flow. · Inspect your feet daily for blisters, cuts, cracks, or sores. If you cannot see well, use a mirror or have someone help you. · Take care of your feet:  ? Wash your feet every day. Use warm (not hot) water. Check the water temperature with your wrists or other part of your body, not your feet. ? Dry your feet well. Pat them dry. Do not rub the skin on your feet too hard. Dry well between your toes. If the skin on your feet stays moist, bacteria or a fungus can grow, which can lead to infection. ?  Keep your skin soft. Use moisturizing skin cream to keep the skin on your feet soft and prevent calluses and cracks. But do not put the cream between your toes, and stop using any cream that causes a rash. ? Clean underneath your toenails carefully. Do not use a sharp object to clean underneath your toenails. Use the blunt end of a nail file or other rounded tool. ? Trim and file your toenails straight across to prevent ingrown toenails. Use a nail clipper, not scissors. Use an emery board to smooth the edges. · Change socks daily. Socks without seams are best, because seams often rub the feet. You can find socks for people with diabetes from specialty catalogs. · Look inside your shoes every day for things like gravel or torn linings, which could cause blisters or sores. · Buy shoes that fit well:  ? Look for shoes that have plenty of space around the toes. This helps prevent bunions and blisters. ? Try on shoes while wearing the kind of socks you will usually wear with the shoes. ? Avoid plastic shoes. They may rub your feet and cause blisters. Good shoes should be made of materials that are flexible and breathable, such as leather or cloth. ? Break in new shoes slowly by wearing them for no more than an hour a day for several days. Take extra time to check your feet for red areas, blisters, or other problems after you wear new shoes. · Do not go barefoot. Do not wear sandals, and do not wear shoes with very thin soles. Thin soles are easy to puncture. They also do not protect your feet from hot pavement or cold weather. · Have your doctor check your feet during each visit. If you have a foot problem, see your doctor. Do not try to treat an early foot problem at home. Home remedies or treatments that you can buy without a prescription (such as corn removers) can be harmful. · Always get early treatment for foot problems. A minor irritation can lead to a major problem if not properly cared for early.   When should you call for help? Call your doctor now or seek immediate medical care if:    · You have a foot sore, an ulcer or break in the skin that is not healing after 4 days, bleeding corns or calluses, or an ingrown toenail.     · You have blue or black areas, which can mean bruising or blood flow problems.     · You have peeling skin or tiny blisters between your toes or cracking or oozing of the skin.     · You have a fever for more than 24 hours and a foot sore.     · You have new numbness or tingling in your feet that does not go away after you move your feet or change positions.     · You have unexplained or unusual swelling of the foot or ankle.    Watch closely for changes in your health, and be sure to contact your doctor if:    · You cannot do proper foot care. Where can you learn more? Go to http://dariusz-stanley.info/. Enter A739 in the search box to learn more about \"Diabetes Foot Health: Care Instructions. \"  Current as of: July 25, 2018  Content Version: 12.1  © 8071-9171 [employer], [employer]orporated. Care instructions adapted under license by [employer] (which disclaims liability or warranty for this information). If you have questions about a medical condition or this instruction, always ask your healthcare professional. [address] any warranty or liability for your use of this information.

## 2019-09-23 DIAGNOSIS — F41.1 GAD (GENERALIZED ANXIETY DISORDER): ICD-10-CM

## 2019-09-23 DIAGNOSIS — F33.1 MDD (MAJOR DEPRESSIVE DISORDER), RECURRENT EPISODE, MODERATE (HCC): ICD-10-CM

## 2019-09-24 RX ORDER — MIRTAZAPINE 30 MG/1
TABLET, FILM COATED ORAL
Qty: 90 TAB | Refills: 0 | Status: SHIPPED | OUTPATIENT
Start: 2019-09-24 | End: 2019-12-09 | Stop reason: SDUPTHER

## 2019-10-05 DIAGNOSIS — E11.9 CONTROLLED TYPE 2 DIABETES MELLITUS WITHOUT COMPLICATION, WITHOUT LONG-TERM CURRENT USE OF INSULIN (HCC): ICD-10-CM

## 2019-10-05 DIAGNOSIS — I10 ESSENTIAL HYPERTENSION: ICD-10-CM

## 2019-10-05 DIAGNOSIS — E78.00 PURE HYPERCHOLESTEROLEMIA: ICD-10-CM

## 2019-10-07 RX ORDER — GLIPIZIDE 10 MG/1
TABLET, FILM COATED, EXTENDED RELEASE ORAL
Qty: 90 TAB | Refills: 0 | Status: SHIPPED | OUTPATIENT
Start: 2019-10-07 | End: 2020-01-03

## 2019-10-07 RX ORDER — SIMVASTATIN 40 MG/1
TABLET, FILM COATED ORAL
Qty: 90 TAB | Refills: 0 | Status: SHIPPED | OUTPATIENT
Start: 2019-10-07 | End: 2020-01-03

## 2019-10-07 RX ORDER — ATENOLOL 100 MG/1
TABLET ORAL
Qty: 90 TAB | Refills: 0 | Status: SHIPPED | OUTPATIENT
Start: 2019-10-07 | End: 2020-01-03

## 2019-10-07 RX ORDER — BENAZEPRIL HYDROCHLORIDE 40 MG/1
TABLET ORAL
Qty: 90 TAB | Refills: 0 | Status: SHIPPED | OUTPATIENT
Start: 2019-10-07 | End: 2019-12-09 | Stop reason: SDUPTHER

## 2019-10-07 RX ORDER — METFORMIN HYDROCHLORIDE 500 MG/1
TABLET ORAL
Qty: 360 TAB | Refills: 0 | Status: SHIPPED | OUTPATIENT
Start: 2019-10-07 | End: 2019-12-09 | Stop reason: SDUPTHER

## 2019-10-07 RX ORDER — FENOFIBRATE 160 MG/1
TABLET ORAL
Qty: 90 TAB | Refills: 0 | Status: SHIPPED | OUTPATIENT
Start: 2019-10-07 | End: 2020-01-03

## 2019-10-14 DIAGNOSIS — F41.1 GAD (GENERALIZED ANXIETY DISORDER): ICD-10-CM

## 2019-10-15 DIAGNOSIS — F41.1 GAD (GENERALIZED ANXIETY DISORDER): ICD-10-CM

## 2019-10-15 RX ORDER — ALPRAZOLAM 0.5 MG/1
TABLET ORAL
Qty: 90 TAB | Refills: 0 | Status: SHIPPED | OUTPATIENT
Start: 2019-10-15 | End: 2019-10-15 | Stop reason: SDUPTHER

## 2019-10-15 RX ORDER — ALPRAZOLAM 0.5 MG/1
TABLET ORAL
Qty: 90 TAB | Refills: 0 | Status: SHIPPED | OUTPATIENT
Start: 2019-10-15 | End: 2019-11-12 | Stop reason: SDUPTHER

## 2019-11-08 RX ORDER — VENLAFAXINE HYDROCHLORIDE 150 MG/1
CAPSULE, EXTENDED RELEASE ORAL
Qty: 90 CAP | Refills: 0 | Status: SHIPPED | OUTPATIENT
Start: 2019-11-08 | End: 2019-11-08 | Stop reason: SDUPTHER

## 2019-11-11 RX ORDER — VENLAFAXINE HYDROCHLORIDE 150 MG/1
150 CAPSULE, EXTENDED RELEASE ORAL DAILY
Qty: 90 CAP | Refills: 1 | Status: SHIPPED | OUTPATIENT
Start: 2019-11-11

## 2019-11-12 DIAGNOSIS — F41.1 GAD (GENERALIZED ANXIETY DISORDER): ICD-10-CM

## 2019-11-13 RX ORDER — ALPRAZOLAM 0.5 MG/1
TABLET ORAL
Qty: 90 TAB | Refills: 0 | Status: SHIPPED | OUTPATIENT
Start: 2019-11-13 | End: 2019-12-16 | Stop reason: SDUPTHER

## 2019-11-13 NOTE — TELEPHONE ENCOUNTER
Patient is asking for refills on his Xanax he was last seen in the office on 9/16/19 for DM follow up last visit for Anxiety was on 3/18/19. Please advise,  ran and placed in office.

## 2019-12-09 DIAGNOSIS — F41.1 GAD (GENERALIZED ANXIETY DISORDER): ICD-10-CM

## 2019-12-09 DIAGNOSIS — I10 ESSENTIAL HYPERTENSION: ICD-10-CM

## 2019-12-09 DIAGNOSIS — E11.9 CONTROLLED TYPE 2 DIABETES MELLITUS WITHOUT COMPLICATION, WITHOUT LONG-TERM CURRENT USE OF INSULIN (HCC): ICD-10-CM

## 2019-12-09 DIAGNOSIS — F33.1 MDD (MAJOR DEPRESSIVE DISORDER), RECURRENT EPISODE, MODERATE (HCC): ICD-10-CM

## 2019-12-10 RX ORDER — METFORMIN HYDROCHLORIDE 500 MG/1
TABLET ORAL
Qty: 360 TAB | Refills: 0 | Status: SHIPPED | OUTPATIENT
Start: 2019-12-10 | End: 2020-04-27

## 2019-12-10 RX ORDER — MIRTAZAPINE 30 MG/1
TABLET, FILM COATED ORAL
Qty: 90 TAB | Refills: 0 | Status: SHIPPED | OUTPATIENT
Start: 2019-12-10 | End: 2020-03-12

## 2019-12-10 RX ORDER — BENAZEPRIL HYDROCHLORIDE 40 MG/1
TABLET ORAL
Qty: 90 TAB | Refills: 0 | Status: SHIPPED | OUTPATIENT
Start: 2019-12-10 | End: 2020-04-20

## 2019-12-16 ENCOUNTER — OFFICE VISIT (OUTPATIENT)
Dept: FAMILY MEDICINE CLINIC | Age: 74
End: 2019-12-16

## 2019-12-16 VITALS
OXYGEN SATURATION: 98 % | HEIGHT: 68 IN | SYSTOLIC BLOOD PRESSURE: 128 MMHG | WEIGHT: 193 LBS | BODY MASS INDEX: 29.25 KG/M2 | HEART RATE: 81 BPM | DIASTOLIC BLOOD PRESSURE: 82 MMHG | TEMPERATURE: 98 F | RESPIRATION RATE: 14 BRPM

## 2019-12-16 DIAGNOSIS — F41.1 GAD (GENERALIZED ANXIETY DISORDER): ICD-10-CM

## 2019-12-16 RX ORDER — ALPRAZOLAM 0.5 MG/1
TABLET ORAL
Qty: 90 TAB | Refills: 0 | Status: SHIPPED | OUTPATIENT
Start: 2019-12-16 | End: 2020-01-16 | Stop reason: SDUPTHER

## 2019-12-16 NOTE — PROGRESS NOTES
Kathie Eugene is a 69 y.o. male  presents for follow up of anxiety. He needs refills. HPI:   Patient has anxiety. He needs refills. He has no side effects  Pain control:           Current : well controlled   0/10           Worst pain over last week        0/10           Least pain over the last week   0/10  Activities of Daily Living:            well controlled            Are you able to do your normal daily activities?   all day   Patient Goals            Functional:  yes            Pain: NA  Adverse side effects:             Since starting your pain medicine are you experiencing any of the following?              ( Examples: Constipation, fatigue, lapses in memory, depression or sexual                        Dysfunction)   No   Is a Pain management Contract in the patient's chart? yes  Aberrant behaviors:              none(Early refill requests, lost prescriptions, lost medicine)  Pill count:             Is it consistent with patient's prescription? {Yes/No:[id number]:L: \"yes\"  Last urine drug screen:     [facility] Prescription Monitoring Program check performed:  yes        Treatment Care Team:  Patient Care Team:  Josefina Chris MD as PCP - General (Family Practice)  Josefina Chris MD as PCP [facility]  Jai Garcia MD (Pulmonary Disease)  Jai Garcia MD (Pulmonary Disease)  Follow up contact scheduled for 1-4 weeks: yes      No Known Allergies  Outpatient Medications Marked as Taking for the 12/16/19 encounter (Office Visit) with Josefina Chris MD   Medication Sig Dispense Refill    benazepril (LOTENSIN) 40 mg tablet TAKE 1 TABLET BY MOUTH  EVERY DAY 90 Tab 0    metFORMIN (GLUCOPHAGE) 500 mg tablet TAKE 2 TABLETS BY MOUTH  TWICE A DAY WITH MEALS 360 Tab 0    mirtazapine (REMERON) 30 mg tablet TAKE 1 TABLET BY MOUTH  NIGHTLY 90 Tab 0    ALPRAZolam (XANAX) 0.5 mg tablet TAKE 1 TABLET BY MOUTH THREE TIMES DAILY AS NEEDED FOR ANXIETY.  MAX DAILY AMOUNT: 1.5 MG 90 Tab 0    venlafaxine-SR (EFFEXOR-XR) 150 mg capsule Take 1 Cap by mouth daily. 90 Cap 1    simvastatin (ZOCOR) 40 mg tablet TAKE 1 TABLET BY MOUTH  NIGHTLY 90 Tab 0    fenofibrate (LOFIBRA) 160 mg tablet TAKE 1 TABLET BY MOUTH  DAILY 90 Tab 0    atenolol (TENORMIN) 100 mg tablet TAKE 1 TABLET BY MOUTH  DAILY 90 Tab 0    glipiZIDE SR (GLUCOTROL XL) 10 mg CR tablet TAKE 1 TABLET BY MOUTH  DAILY 90 Tab 0    simvastatin (ZOCOR) 40 mg tablet TAKE 1 TABLET BY MOUTH  NIGHTLY 90 Tab 0    omeprazole (PRILOSEC) 40 mg capsule Take 1 Cap by mouth daily. 30 Cap 0    simvastatin (ZOCOR) 40 mg tablet Take 1 Tab by mouth nightly. 90 Tab 0    sildenafil citrate (VIAGRA) 50 mg tablet Take 1 Tab by mouth as needed. 8 Tab 3    albuterol (PROVENTIL HFA, VENTOLIN HFA, PROAIR HFA) 90 mcg/actuation inhaler Take 2 Puffs by inhalation every four (4) hours as needed for Wheezing. 1 Inhaler 1    aspirin delayed-release 81 mg tablet Take  by mouth daily.        Patient Active Problem List   Diagnosis Code    Essential hypertension I10    Controlled type 2 diabetes mellitus without complication, without long-term current use of insulin ([facility]) E11.9    Hyperlipidemia E78.5    Chronic left shoulder pain M25.512, G89.29    Pain management contract agreement Z02.89    ACP (advance care planning) Z71.89    Type 2 diabetes mellitus with nephropathy ([facility]) E11.21    Recurrent depression ([facility] [address] F33.9    TACHO (generalized anxiety disorder) F41.1    Palpitations R00.2     Past Medical History:   Diagnosis Date    Depression     [facility]     Hypercholesterolemia     Hypertension      Social History     Socioeconomic History    Marital status:      Spouse name: Not on file    Number of children: Not on file    Years of education: Not on file    Highest education level: Not on file   Tobacco Use    Smoking status: Current Every Day Smoker     Packs/day: 0.50     Years: 55.00     Pack years: 27.50     Types: Cigarettes    Smokeless tobacco: Never Used   Substance and Sexual Activity    Alcohol use: No     Family History   Problem Relation Age of Onset    Emphysema Mother     Hypertension Father     Heart Attack Father     Emphysema Paternal Grandmother         Review of Systems   Constitutional: Negative for chills, fever, malaise/fatigue and weight loss. Eyes: Negative for blurred vision. Respiratory: Negative for shortness of breath and wheezing. Cardiovascular: Negative for chest pain. Gastrointestinal: Negative for nausea and vomiting. Musculoskeletal: Negative for myalgias. Skin: Negative for rash. Neurological: Negative for weakness. Psychiatric/Behavioral: Negative for depression, hallucinations, memory loss, substance abuse and suicidal ideas. The patient is not nervous/anxious and does not have insomnia. Vitals:    12/16/19 1107   BP: 128/82   Pulse: 81   Resp: 14   Temp: 98 °F (36.7 °C)   SpO2: 98%   Weight: 193 lb (87.5 kg)   Height: 5' 8\" (1.727 m)   PainSc:   0 - No pain       Physical Exam  Vitals signs and nursing note reviewed. Neck:      Musculoskeletal: Normal range of motion and neck supple. Thyroid: No thyromegaly. Cardiovascular:      Rate and Rhythm: Normal rate and regular rhythm. Heart sounds: Normal heart sounds. Pulmonary:      Effort: Pulmonary effort is normal.      Breath sounds: Normal breath sounds. Musculoskeletal: Normal range of motion. Skin:     General: Skin is warm and dry. Neurological:      Mental Status: He is alert and oriented to person, place, and time. Psychiatric:         Mood and Affect: Mood normal.         Behavior: Behavior normal.         Thought Content: Thought content normal.         Judgment: Judgment normal.         Assessment/Plan      ICD-10-CM ICD-9-CM    1.  TACHO (generalized anxiety disorder) F41.1 300.02 ALPRAZolam (XANAX) 0.5 mg tablet      I have discussed the diagnosis with the patient and the intended plan of care as seen in the above orders. The patient has received an after-visit summary and questions were answered concerning future plans. I have discussed medication, side effects, and warnings with the patient in detail. The patient verbalized understanding and is in agreement with the plan of care. The patient will contact the office with any additional concerns.       lab results and schedule of future lab studies reviewed with patient    Gemini Perez MD

## 2019-12-16 NOTE — PATIENT INSTRUCTIONS
Learning About Generalized Anxiety Disorder What is generalized anxiety disorder? We all worry. It's a normal part of life. But when you have generalized anxiety disorder, you worry about lots of things and have a hard time stopping your worry. This worry or anxiety interferes with your relationships, work, and life. What causes it? The cause is not known. But it may be passed down through families. What are the symptoms? You may feel anxious or worry most days about things like work, relationships, health, or money. You may worry about things that are unlikely to happen. You find it hard to stop or control the worry. Because you worry a lot and try hard to stop worrying, you may feel restless, tired, tense, or cranky. You may also find it hard to think or sleep. And you may have headaches or an upset stomach. How is it diagnosed? Your doctor will ask about your health and how often you worry or feel anxious. He or she may ask about other symptoms, like whether you: · Feel restless. · Feel tired. · Have a hard time thinking or feel that your mind goes blank. · Feel cranky. · Have tense muscles. · Have sleep problems. A physical exam and tests can help make sure that your symptoms aren't caused by a different condition, such as a thyroid problem. How is it treated? Counseling and medicine can both work to treat anxiety. The two are often used along with lifestyle changes. Cognitive-behavioral therapy (CBT) is a type of counseling that's used to help treat anxiety. In CBT, you learn how to notice and replace thoughts that make you feel worried. It also can help you learn how to relax when you worry. Medicines can help. These medicines are often also used for depression. Selective serotonin reuptake inhibitors (SSRIs) are often tried first. But there are other medicines that your doctor may use. You may need to try a few medicines to find one that works well. Many people feel better by getting regular exercise, eating healthy meals, and getting good sleep. Mindfulnessfocusing on things that happen in the present momentalso can help reduce your anxiety. What can you expect when you have it? Having anxiety can be upsetting. Some people might feel less worried and stressed after a couple of months of treatment. But for other people, it might take longer to feel better. Reaching out to people for help is important. Try not to isolate yourself. Let your family and friends help you. Find someone you can trust and confide in. Talk to that person. When you know what anxiety isand how you can get help for ityou can start to learn new ways of thinking. This can help you cope and work through your anxiety. Follow-up care is a key part of your treatment and safety. Be sure to make and go to all appointments, and call your doctor if you are having problems. It's also a good idea to know your test results and keep a list of the medicines you take. Where can you learn more? Go to http://dariusz-stanley.info/. Enter G110 in the search box to learn more about \"Learning About Generalized Anxiety Disorder. \" Current as of: May 28, 2019 Content Version: 12.2 © 0154-0866 [employer]. Care instructions adapted under license by [employer] (which disclaims liability or warranty for this information). If you have questions about a medical condition or this instruction, always ask your healthcare professional. [address] any warranty or liability for your use of this information.

## 2020-01-02 DIAGNOSIS — E78.00 PURE HYPERCHOLESTEROLEMIA: ICD-10-CM

## 2020-01-03 RX ORDER — ATENOLOL 100 MG/1
TABLET ORAL
Qty: 90 TAB | Refills: 0 | Status: SHIPPED | OUTPATIENT
Start: 2020-01-03 | End: 2020-07-13 | Stop reason: SDUPTHER

## 2020-01-03 RX ORDER — FENOFIBRATE 160 MG/1
TABLET ORAL
Qty: 90 TAB | Refills: 0 | Status: SHIPPED | OUTPATIENT
Start: 2020-01-03 | End: 2020-07-13 | Stop reason: SDUPTHER

## 2020-01-03 RX ORDER — SIMVASTATIN 40 MG/1
TABLET, FILM COATED ORAL
Qty: 90 TAB | Refills: 0 | Status: SHIPPED | OUTPATIENT
Start: 2020-01-03 | End: 2020-07-13 | Stop reason: SDUPTHER

## 2020-01-03 RX ORDER — GLIPIZIDE 10 MG/1
TABLET, FILM COATED, EXTENDED RELEASE ORAL
Qty: 90 TAB | Refills: 0 | Status: SHIPPED | OUTPATIENT
Start: 2020-01-03 | End: 2020-07-13 | Stop reason: SDUPTHER

## 2020-01-16 ENCOUNTER — OFFICE VISIT (OUTPATIENT)
Dept: FAMILY MEDICINE CLINIC | Age: 75
End: 2020-01-16

## 2020-01-16 VITALS
HEART RATE: 71 BPM | DIASTOLIC BLOOD PRESSURE: 86 MMHG | OXYGEN SATURATION: 97 % | WEIGHT: 192 LBS | SYSTOLIC BLOOD PRESSURE: 132 MMHG | HEIGHT: 68 IN | BODY MASS INDEX: 29.1 KG/M2 | RESPIRATION RATE: 13 BRPM

## 2020-01-16 DIAGNOSIS — F41.1 GAD (GENERALIZED ANXIETY DISORDER): ICD-10-CM

## 2020-01-16 RX ORDER — ALPRAZOLAM 0.5 MG/1
TABLET ORAL
Qty: 90 TAB | Refills: 0 | Status: SHIPPED | OUTPATIENT
Start: 2020-01-16 | End: 2020-02-13 | Stop reason: SDUPTHER

## 2020-01-16 NOTE — PROGRESS NOTES
Lay Coleman is a 70 y.o. male  presents for follow up. No  Ideas of suicide or homicide. No Known Allergies    Patient Active Problem List   Diagnosis Code    Essential hypertension I10    Controlled type 2 diabetes mellitus without complication, without long-term current use of insulin ([facility]) E11.9    Hyperlipidemia E78.5    Chronic left shoulder pain M25.512, G89.29    Pain management contract agreement Z02.89    ACP (advance care planning) Z71.89    Type 2 diabetes mellitus with nephropathy ([facility]) E11.21    Recurrent depression ([facility] [address] F33.9    TACHO (generalized anxiety disorder) F41.1    Palpitations R00.2     Past Medical History:   Diagnosis Date    Depression     Diabetes ([facility] [address]     Hypercholesterolemia     Hypertension      Social History     Socioeconomic History    Marital status:      Spouse name: Not on file    Number of children: Not on file    Years of education: Not on file    Highest education level: Not on file   Tobacco Use    Smoking status: Current Every Day Smoker     Packs/day: 0.50     Years: 55.00     Pack years: 27.50     Types: Cigarettes    Smokeless tobacco: Never Used   Substance and Sexual Activity    Alcohol use: No     Family History   Problem Relation Age of Onset    Emphysema Mother     Hypertension Father     Heart Attack Father     Emphysema Paternal Grandmother         Review of Systems   Constitutional: Negative for chills, fever, malaise/fatigue and weight loss. Eyes: Negative for blurred vision. Respiratory: Negative for shortness of breath and wheezing. Cardiovascular: Negative for chest pain. Gastrointestinal: Negative for nausea and vomiting. Musculoskeletal: Negative for myalgias. Skin: Negative for rash. Neurological: Negative for weakness.        Vitals:    01/16/20 1353   BP: 132/86   Pulse: 71   Resp: 13   SpO2: 97%   Weight: 192 lb (87.1 kg)   Height: 5' 8\" (1.727 m)   PainSc:   0 - No pain       Physical Exam  Vitals signs and nursing note reviewed. Neck:      Musculoskeletal: Normal range of motion and neck supple. Thyroid: No thyromegaly. Cardiovascular:      Rate and Rhythm: Normal rate and regular rhythm. Heart sounds: Normal heart sounds. Pulmonary:      Effort: Pulmonary effort is normal.      Breath sounds: Normal breath sounds. Musculoskeletal: Normal range of motion. Skin:     General: Skin is warm and dry. Neurological:      Mental Status: He is alert and oriented to person, place, and time. Assessment/Plan      ICD-10-CM ICD-9-CM    1. TACHO (generalized anxiety disorder) F41.1 300.02 ALPRAZolam (XANAX) 0.5 mg tablet     I have discussed the diagnosis with the patient and the intended plan of care as seen in the above orders. The patient has received an after-visit summary and questions were answered concerning future plans. I have discussed medication, side effects, and warnings with the patient in detail. The patient verbalized understanding and is in agreement with the plan of care. The patient will contact the office with any additional concerns.     lab results and schedule of future lab studies reviewed with patient    Leslie Pimentel MD

## 2020-01-16 NOTE — PROGRESS NOTES
Pt in office for f/u TACHO. 1. Have you been to the ER, urgent care clinic since your last visit? Hospitalized since your last visit? No    2. Have you seen or consulted any other health care providers outside of the [address] [place] [address] since your last visit? Include any pap smears or colon screening.  No

## 2020-01-16 NOTE — PATIENT INSTRUCTIONS

## 2020-02-13 ENCOUNTER — OFFICE VISIT (OUTPATIENT)
Dept: FAMILY MEDICINE CLINIC | Age: 75
End: 2020-02-13

## 2020-02-13 VITALS
DIASTOLIC BLOOD PRESSURE: 82 MMHG | SYSTOLIC BLOOD PRESSURE: 124 MMHG | BODY MASS INDEX: 28.95 KG/M2 | HEIGHT: 68 IN | WEIGHT: 191 LBS | HEART RATE: 81 BPM | OXYGEN SATURATION: 98 % | RESPIRATION RATE: 13 BRPM

## 2020-02-13 DIAGNOSIS — E11.21 TYPE 2 DIABETES MELLITUS WITH NEPHROPATHY (HCC): Primary | ICD-10-CM

## 2020-02-13 DIAGNOSIS — Z00.00 MEDICARE ANNUAL WELLNESS VISIT, SUBSEQUENT: ICD-10-CM

## 2020-02-13 DIAGNOSIS — Z71.89 ACP (ADVANCE CARE PLANNING): ICD-10-CM

## 2020-02-13 DIAGNOSIS — F41.1 GAD (GENERALIZED ANXIETY DISORDER): ICD-10-CM

## 2020-02-13 DIAGNOSIS — R09.81 NASAL CONGESTION: ICD-10-CM

## 2020-02-13 LAB — HBA1C MFR BLD HPLC: 7.2 %

## 2020-02-13 RX ORDER — ALPRAZOLAM 0.5 MG/1
TABLET ORAL
Qty: 90 TAB | Refills: 0 | Status: SHIPPED | OUTPATIENT
Start: 2020-02-13 | End: 2020-03-12 | Stop reason: SDUPTHER

## 2020-02-13 NOTE — PROGRESS NOTES
This is the Subsequent Medicare Annual Wellness Exam, performed 12 months or more after the Initial AWV or the last Subsequent AWV    I have reviewed the patient's medical history in detail and updated the computerized patient record. History     Patient Active Problem List   Diagnosis Code    Essential hypertension I10    Controlled type 2 diabetes mellitus without complication, without long-term current use of insulin ([facility] E11.9    Hyperlipidemia E78.5    Chronic left shoulder pain M25.512, G89.29    Pain management contract agreement Z02.89    ACP (advance care planning) Z71.89    Type 2 diabetes mellitus with nephropathy ([facility] E11.21    Recurrent depression [facility] F33.9    TACHO (generalized anxiety disorder) F41.1    Palpitations R00.2     Past Medical History:   Diagnosis Date    Depression     Diabetes [facility]     Hypercholesterolemia     Hypertension       Past Surgical History:   Procedure Laterality Date    HX ORTHOPAEDIC  2010    left shoulder repair    HX TUMOR REMOVAL Left 10/23/2018    left ear melanoma     Current Outpatient Medications   Medication Sig Dispense Refill    ALPRAZolam (XANAX) 0.5 mg tablet TAKE 1 TABLET BY MOUTH THREE TIMES DAILY AS NEEDED FOR ANXIETY. MAX DAILY AMOUNT: 1.5 MG 90 Tab 0    glipiZIDE SR (GLUCOTROL XL) 10 mg CR tablet TAKE 1 TABLET BY MOUTH  DAILY 90 Tab 0    fenofibrate (LOFIBRA) 160 mg tablet TAKE 1 TABLET BY MOUTH  DAILY 90 Tab 0    simvastatin (ZOCOR) 40 mg tablet TAKE 1 TABLET BY MOUTH  NIGHTLY 90 Tab 0    atenolol (TENORMIN) 100 mg tablet TAKE 1 TABLET BY MOUTH  DAILY 90 Tab 0    benazepril (LOTENSIN) 40 mg tablet TAKE 1 TABLET BY MOUTH  EVERY DAY 90 Tab 0    metFORMIN (GLUCOPHAGE) 500 mg tablet TAKE 2 TABLETS BY MOUTH  TWICE A DAY WITH MEALS 360 Tab 0    mirtazapine (REMERON) 30 mg tablet TAKE 1 TABLET BY MOUTH  NIGHTLY 90 Tab 0    venlafaxine-SR (EFFEXOR-XR) 150 mg capsule Take 1 Cap by mouth daily.  90 Cap 1    simvastatin (ZOCOR) 40 mg tablet TAKE 1 TABLET BY MOUTH  NIGHTLY 90 Tab 0    omeprazole (PRILOSEC) 40 mg capsule Take 1 Cap by mouth daily. 30 Cap 0    simvastatin (ZOCOR) 40 mg tablet Take 1 Tab by mouth nightly. 90 Tab 0    sildenafil citrate (VIAGRA) 50 mg tablet Take 1 Tab by mouth as needed. 8 Tab 3    albuterol (PROVENTIL HFA, VENTOLIN HFA, PROAIR HFA) 90 mcg/actuation inhaler Take 2 Puffs by inhalation every four (4) hours as needed for Wheezing. 1 Inhaler 1    aspirin delayed-release 81 mg tablet Take  by mouth daily.        No Known Allergies    Family History   Problem Relation Age of Onset    Emphysema Mother     Hypertension Father     Heart Attack Father     Emphysema Paternal Grandmother      Social History     Tobacco Use    Smoking status: Current Every Day Smoker     Packs/day: 0.50     Years: 55.00     Pack years: 27.50     Types: Cigarettes    Smokeless tobacco: Never Used   Substance Use Topics    Alcohol use: No       Depression Risk Factor Screening:     3 most recent PHQ Screens 7/7/2017   PHQ Not Done -   Little interest or pleasure in doing things Nearly every day   Feeling down, depressed, irritable, or hopeless Nearly every day   Total Score PHQ 2 6   Trouble falling or staying asleep, or sleeping too much Nearly every day   Feeling tired or having little energy Several days   Poor appetite, weight loss, or overeating More than half the days   Feeling bad about yourself - or that you are a failure or have let yourself or your family down Not at all   Trouble concentrating on things such as school, work, reading, or watching TV Nearly every day   Moving or speaking so slowly that other people could have noticed; or the opposite being so fidgety that others notice Nearly every day   Thoughts of being better off dead, or hurting yourself in some way Nearly every day   PHQ 9 Score 21   How difficult have these problems made it for you to do your work, take care of your home and get along with others Somewhat difficult       Alcohol Risk Factor Screening (MALE > 65): Do you average more 1 drink per night or more than 7 drinks a week: No    In the past three months have you have had more than 4 drinks containing alcohol on one occasion: No      Functional Ability and Level of Safety:   Hearing: Hearing is good. Activities of Daily Living: The home contains: no safety equipment. Patient does total self care    Ambulation: with no difficulty    Fall Risk:  Fall Risk Assessment, last 12 mths 2/13/2020   Able to walk? Yes   Fall in past 12 months? No       Abuse Screen:  Patient is not abused    Cognitive Screening   Has your family/caregiver stated any concerns about your memory: no  Cognitive Screening: Normal - MMSE (Mini Mental Status Exam)    Patient Care Team   Patient Care Team:  Marianna Myers MD as PCP - General (Family Practice)  Marianna Myers MD as PCP - [facility]  Johanne Villalobos MD (Pulmonary Disease)  Johanne Villalobos MD (Pulmonary Disease)    Assessment/Plan   Education and counseling provided:  Are appropriate based on today's review and evaluation    Diagnoses and all orders for this visit:    1. Type 2 diabetes mellitus with nephropathy (HCC)  -     AMB POC HEMOGLOBIN A1C    2.  Medicare annual wellness visit, subsequent        Health Maintenance Due   Topic Date Due    Foot Exam Q1  01/03/1955    Eye Exam Retinal or Dilated  01/03/1957    DTaP/Tdap/Td series (1 - Tdap) 01/03/1958    Shingrix Vaccine Age 50> (1 of 2) 01/03/1997    FOBT Q1Y Age 54-65  01/03/1997    GLAUCOMA SCREENING Q2Y  01/03/2012    AAA Screening 73-69 YO Male Smoking Patients  01/03/2012    Pneumococcal 65+ years (1 of 1 - PPSV23) 01/03/2012    MICROALBUMIN Q1  10/22/2019    A1C test (Diabetic or Prediabetic)  12/16/2019    Medicare Yearly Exam  02/19/2020     Josse Bella MD

## 2020-02-13 NOTE — PROGRESS NOTES
Sydnee Castle is a 70 y.o. male  presents for follow up of DM. No new sxs. No Known Allergies    Patient Active Problem List   Diagnosis Code    Essential hypertension I10    Controlled type 2 diabetes mellitus without complication, without long-term current use of insulin (HCC) E11.9    Hyperlipidemia E78.5    Chronic left shoulder pain M25.512, G89.29    Pain management contract agreement Z02.89    ACP (advance care planning) Z71.89    Type 2 diabetes mellitus with nephropathy (HCC) E11.21    Recurrent depression ([facility] F33.9    TACHO (generalized anxiety disorder) F41.1    Palpitations R00.2     Past Medical History:   Diagnosis Date    Depression     Diabetes ([facility]     Hypercholesterolemia     Hypertension      Social History     Socioeconomic History    Marital status:      Spouse name: Not on file    Number of children: Not on file    Years of education: Not on file    Highest education level: Not on file   Tobacco Use    Smoking status: Current Every Day Smoker     Packs/day: 0.50     Years: 55.00     Pack years: 27.50     Types: Cigarettes    Smokeless tobacco: Never Used   Substance and Sexual Activity    Alcohol use: No     Family History   Problem Relation Age of Onset    Emphysema Mother     Hypertension Father     Heart Attack Father     Emphysema Paternal Grandmother         Review of Systems   Constitutional: Negative for chills, fever, malaise/fatigue and weight loss. Eyes: Negative for blurred vision. Respiratory: Negative for shortness of breath and wheezing. Cardiovascular: Negative for chest pain. Gastrointestinal: Negative for nausea and vomiting. Musculoskeletal: Negative for myalgias. Skin: Negative for rash. Neurological: Negative for weakness. Psychiatric/Behavioral: Negative.         Vitals:    02/13/20 1329   BP: 124/82   Pulse: 81   Resp: 13   SpO2: 98%   Weight: 191 lb (86.6 kg)   Height: 5' 8\" (1.727 m)   PainSc:   0 - No pain Physical Exam  Vitals signs and nursing note reviewed. Neck:      Musculoskeletal: Normal range of motion and neck supple. Thyroid: No thyromegaly. Cardiovascular:      Rate and Rhythm: Normal rate and regular rhythm. Heart sounds: Normal heart sounds. Pulmonary:      Effort: Pulmonary effort is normal.      Breath sounds: Normal breath sounds. Musculoskeletal: Normal range of motion. Skin:     General: Skin is warm and dry. Neurological:      Mental Status: He is alert and oriented to person, place, and time. Psychiatric:         Mood and Affect: Mood normal.         Behavior: Behavior normal.         Thought Content: Thought content normal.         Judgment: Judgment normal.         Assessment/Plan      ICD-10-CM ICD-9-CM    1. Type 2 diabetes mellitus with nephropathy (HCC) E11.21 250.40 AMB POC HEMOGLOBIN A1C     583.81    2. Medicare annual wellness visit, subsequent Z00.00 V70.0    3. ACP (advance care planning) Z71.89 V65.49    4. Nasal congestion R09.81 478.19 REFERRAL TO ENT-OTOLARYNGOLOGY      I have discussed the diagnosis with the patient and the intended plan of care as seen in the above orders. The patient has received an after-visit summary and questions were answered concerning future plans. I have discussed medication, side effects, and warnings with the patient in detail. The patient verbalized understanding and is in agreement with the plan of care. The patient will contact the office with any additional concerns.     lab results and schedule of future lab studies reviewed with patient    Nehemias Cortez MD

## 2020-02-13 NOTE — PATIENT INSTRUCTIONS
Medicare Wellness Visit, Male The best way to live healthy is to have a lifestyle where you eat a well-balanced diet, exercise regularly, limit alcohol use, and quit all forms of tobacco/nicotine, if applicable. Regular preventive services are another way to keep healthy. Preventive services (vaccines, screening tests, monitoring & exams) can help personalize your care plan, which helps you manage your own care. Screening tests can find health problems at the earliest stages, when they are easiest to treat. [facility] follows the current, evidence-based guidelines published by the [facility] ([facility]) when recommending preventive services for our patients. Because we follow these guidelines, sometimes recommendations change over time as research supports it. (For example, a prostate screening blood test is no longer routinely recommended for men with no symptoms). Of course, you and your doctor may decide to screen more often for some diseases, based on your risk and co-morbidities (chronic disease you are already diagnosed with). Preventive services for you include: - Medicare offers their members a free annual wellness visit, which is time for you and your primary care provider to discuss and plan for your preventive service needs. Take advantage of this benefit every year! 
-All adults over age 72 should receive the recommended pneumonia vaccines. Current USPSTF guidelines recommend a series of two vaccines for the best pneumonia protection.  
-All adults should have a flu vaccine yearly and tetanus vaccine every 10 years. 
-All adults age 48 and older should receive the shingles vaccines (series of two vaccines). -All adults age 38-68 who are overweight should have a diabetes screening test once every three years. -Other screening tests & preventive services for persons with diabetes include: an eye exam to screen for diabetic retinopathy, a kidney function test, a foot exam, and stricter control over your cholesterol.  
-Cardiovascular screening for adults with routine risk involves an electrocardiogram (ECG) at intervals determined by the provider.  
-Colorectal cancer screening should be done for adults age 54-65 with no increased risk factors for colorectal cancer. There are a number of acceptable methods of screening for this type of cancer. Each test has its own benefits and drawbacks. Discuss with your provider what is most appropriate for you during your annual wellness visit. The different tests include: colonoscopy (considered the best screening method), a fecal occult blood test, a fecal DNA test, and sigmoidoscopy. 
-All adults born between [facility] should be screened once for Hepatitis C. 
-An Abdominal Aortic Aneurysm (AAA) Screening is recommended for men age 73-68 who has ever smoked in their lifetime. Here is a list of your current Health Maintenance items (your personalized list of preventive services) with a due date: 
Health Maintenance Due Topic Date Due  
 Diabetic Foot Care  01/03/1955  Eye Exam  01/03/1955  
 DTaP/Tdap/Td  (1 - Tdap) 01/03/1958  Shingles Vaccine (1 of 2) 01/03/1997  Colon Cancer Stool Test  01/03/1997  Glaucoma Screening   01/03/2012  AAA Screening  01/03/2012  Pneumococcal Vaccine (1 of 1 - PPSV23) 01/03/2012  Albumin Urine Test  10/22/2019  Hemoglobin A1C    [facility]  02/19/2020

## 2020-02-13 NOTE — ACP (ADVANCE CARE PLANNING)
Advance Care Planning (ACP) Provider Conversation Snapshot    Date of ACP Conversation: 02/13/20  Persons included in Conversation:  patient  Length of ACP Conversation in minutes:  16 minutes    Authorized Decision Maker (if patient is incapable of making informed decisions):    This person is:   Healthcare Agent/Medical Power of  under Advance Directive            For Patients with Decision Making Capacity:   Values/Goals: Exploration of values, goals, and preferences if recovery is not expected, even with continued medical treatment in the event of:  Imminent death  Severe, permanent brain injury    Conversation Outcomes / Follow-Up Plan:   Recommended completion of Advance Directive form after review of ACP materials and conversation with prospective healthcare agent

## 2020-03-11 DIAGNOSIS — F33.1 MDD (MAJOR DEPRESSIVE DISORDER), RECURRENT EPISODE, MODERATE (HCC): ICD-10-CM

## 2020-03-11 DIAGNOSIS — F41.1 GAD (GENERALIZED ANXIETY DISORDER): ICD-10-CM

## 2020-03-12 DIAGNOSIS — F41.1 GAD (GENERALIZED ANXIETY DISORDER): ICD-10-CM

## 2020-03-12 RX ORDER — ALPRAZOLAM 0.5 MG/1
TABLET ORAL
Qty: 90 TAB | Refills: 0 | Status: SHIPPED | OUTPATIENT
Start: 2020-03-12 | End: 2020-04-13 | Stop reason: SDUPTHER

## 2020-03-12 RX ORDER — MIRTAZAPINE 30 MG/1
TABLET, FILM COATED ORAL
Qty: 90 TAB | Refills: 0 | Status: SHIPPED | OUTPATIENT
Start: 2020-03-12 | End: 2020-06-05

## 2020-03-12 NOTE — TELEPHONE ENCOUNTER
Patient asking for refills on his xanax, he was last seen in the office on 2/13/2020 with a 3 month follow up plan.  has been ran and placed in physician office for review.

## 2020-03-16 DIAGNOSIS — R06.02 SOB (SHORTNESS OF BREATH): ICD-10-CM

## 2020-03-16 NOTE — TELEPHONE ENCOUNTER
This patient contacted office for the following prescriptions to be filled:    Medication requested :   Requested Prescriptions     Pending Prescriptions Disp Refills    albuterol (PROVENTIL HFA, VENTOLIN HFA, PROAIR HFA) 90 mcg/actuation inhaler 1 Inhaler 1     Sig: Take 2 Puffs by inhalation every four (4) hours as needed for Wheezing.      PCP: Dr. Kusum Duenas or [facility]  Mail order or Local pharmacy: [phone]    Scheduled appointment if not seen by current providers in office: LOV 2/13/2020, next appt 5/13/2020

## 2020-03-17 RX ORDER — ALBUTEROL SULFATE 90 UG/1
2 AEROSOL, METERED RESPIRATORY (INHALATION)
Qty: 1 INHALER | Refills: 1 | Status: SHIPPED | OUTPATIENT
Start: 2020-03-17

## 2020-04-13 DIAGNOSIS — F41.1 GAD (GENERALIZED ANXIETY DISORDER): ICD-10-CM

## 2020-04-13 RX ORDER — ALPRAZOLAM 0.5 MG/1
TABLET ORAL
Qty: 90 TAB | Refills: 0 | Status: SHIPPED | OUTPATIENT
Start: 2020-04-13 | End: 2020-05-12 | Stop reason: SDUPTHER

## 2020-04-20 DIAGNOSIS — I10 ESSENTIAL HYPERTENSION: ICD-10-CM

## 2020-04-20 RX ORDER — BENAZEPRIL HYDROCHLORIDE 40 MG/1
TABLET ORAL
Qty: 90 TAB | Refills: 0 | Status: SHIPPED | OUTPATIENT
Start: 2020-04-20 | End: 2020-07-13 | Stop reason: SDUPTHER

## 2020-04-24 DIAGNOSIS — E11.9 CONTROLLED TYPE 2 DIABETES MELLITUS WITHOUT COMPLICATION, WITHOUT LONG-TERM CURRENT USE OF INSULIN (HCC): ICD-10-CM

## 2020-04-27 RX ORDER — METFORMIN HYDROCHLORIDE 500 MG/1
TABLET ORAL
Qty: 360 TAB | Refills: 0 | Status: SHIPPED | OUTPATIENT
Start: 2020-04-27 | End: 2020-08-05

## 2020-05-12 DIAGNOSIS — F41.1 GAD (GENERALIZED ANXIETY DISORDER): ICD-10-CM

## 2020-05-12 RX ORDER — ALPRAZOLAM 0.5 MG/1
TABLET ORAL
Qty: 90 TAB | Refills: 0 | Status: SHIPPED | OUTPATIENT
Start: 2020-05-12 | End: 2020-06-08 | Stop reason: SDUPTHER

## 2020-05-12 NOTE — TELEPHONE ENCOUNTER
Mr. Linford Richardson called back in reference to scheduling virtual visit. He prefers to be face to face with provider and would like to push his appointment out. He is asking for a 30 day supply of xanax. He is scheduled June 11th, 2020 for the follow up. Please review and advise. Requested Prescriptions     Pending Prescriptions Disp Refills    ALPRAZolam (XANAX) 0.5 mg tablet 90 Tab 0     Sig: TAKE 1 TABLET BY MOUTH THREE TIMES DAILY AS NEEDED FOR ANXIETY.  MAX DAILY AMOUNT: 1.5 MG

## 2020-06-04 DIAGNOSIS — F41.1 GAD (GENERALIZED ANXIETY DISORDER): ICD-10-CM

## 2020-06-04 DIAGNOSIS — F33.1 MDD (MAJOR DEPRESSIVE DISORDER), RECURRENT EPISODE, MODERATE (HCC): ICD-10-CM

## 2020-06-05 RX ORDER — MIRTAZAPINE 30 MG/1
TABLET, FILM COATED ORAL
Qty: 90 TAB | Refills: 0 | Status: SHIPPED | OUTPATIENT
Start: 2020-06-05 | End: 2020-08-31

## 2020-06-08 DIAGNOSIS — F41.1 GAD (GENERALIZED ANXIETY DISORDER): ICD-10-CM

## 2020-06-08 NOTE — TELEPHONE ENCOUNTER
[facility]  Appointment scheduled for in office visit July 20th, 2020. Needs filled w/ one refill to last until he comes in.

## 2020-06-09 RX ORDER — ALPRAZOLAM 0.5 MG/1
TABLET ORAL
Qty: 90 TAB | Refills: 1 | Status: SHIPPED | OUTPATIENT
Start: 2020-06-09 | End: 2020-08-10 | Stop reason: SDUPTHER

## 2020-07-13 DIAGNOSIS — E78.00 PURE HYPERCHOLESTEROLEMIA: ICD-10-CM

## 2020-07-13 DIAGNOSIS — I10 ESSENTIAL HYPERTENSION: ICD-10-CM

## 2020-07-13 DIAGNOSIS — E78.5 HYPERLIPIDEMIA, UNSPECIFIED HYPERLIPIDEMIA TYPE: ICD-10-CM

## 2020-07-14 RX ORDER — SIMVASTATIN 40 MG/1
40 TABLET, FILM COATED ORAL
Qty: 90 TAB | Refills: 0 | Status: SHIPPED | OUTPATIENT
Start: 2020-07-14 | End: 2020-09-08

## 2020-07-14 RX ORDER — GLIPIZIDE 10 MG/1
10 TABLET, FILM COATED, EXTENDED RELEASE ORAL DAILY
Qty: 90 TAB | Refills: 0 | Status: SHIPPED | OUTPATIENT
Start: 2020-07-14 | End: 2020-09-08

## 2020-07-14 RX ORDER — BENAZEPRIL HYDROCHLORIDE 40 MG/1
40 TABLET ORAL DAILY
Qty: 90 TAB | Refills: 0 | Status: SHIPPED | OUTPATIENT
Start: 2020-07-14 | End: 2020-09-08

## 2020-07-14 RX ORDER — FENOFIBRATE 160 MG/1
160 TABLET ORAL DAILY
Qty: 90 TAB | Refills: 0 | Status: SHIPPED | OUTPATIENT
Start: 2020-07-14 | End: 2020-09-08

## 2020-07-14 RX ORDER — ATENOLOL 100 MG/1
100 TABLET ORAL DAILY
Qty: 90 TAB | Refills: 0 | Status: SHIPPED | OUTPATIENT
Start: 2020-07-14 | End: 2020-09-08

## 2020-07-14 RX ORDER — SIMVASTATIN 40 MG/1
40 TABLET, FILM COATED ORAL
Qty: 90 TAB | Refills: 0 | Status: SHIPPED | OUTPATIENT
Start: 2020-07-14

## 2020-07-20 ENCOUNTER — VIRTUAL VISIT (OUTPATIENT)
Dept: FAMILY MEDICINE CLINIC | Age: 75
End: 2020-07-20

## 2020-07-20 DIAGNOSIS — I10 ESSENTIAL HYPERTENSION: Primary | ICD-10-CM

## 2020-07-20 DIAGNOSIS — R09.81 NASAL CONGESTION: ICD-10-CM

## 2020-07-20 NOTE — PROGRESS NOTES
Ras Granados is a 70 y.o. male, evaluated via audio-only technology on 7/20/2020 for Hoarse (He has seen ENT but still has problem. no SOB)  . Assessment & Plan:   Diagnoses and all orders for this visit:    1. Essential hypertension    2. Nasal congestion    both stable  12  Subjective:       Prior to Admission medications    Medication Sig Start Date End Date Taking? Authorizing Provider   simvastatin (ZOCOR) 40 mg tablet Take 1 Tab by mouth nightly. 7/14/20   Codey Ray MD   glipiZIDE SR (GLUCOTROL XL) 10 mg CR tablet Take 1 Tab by mouth daily. 7/14/20   Codey Ray MD   fenofibrate (LOFIBRA) 160 mg tablet Take 1 Tab by mouth daily. 7/14/20   Codey Ray MD   simvastatin (ZOCOR) 40 mg tablet Take 1 Tab by mouth nightly. 7/14/20   Codey Ray MD   atenoloL (TENORMIN) 100 mg tablet Take 1 Tab by mouth daily. 7/14/20   Codey Ray MD   benazepriL (LOTENSIN) 40 mg tablet Take 1 Tab by mouth daily. 7/14/20   Codey Ray MD   ALPRAZolam Mozell Dallas) 0.5 mg tablet TAKE 1 TABLET BY MOUTH THREE TIMES DAILY AS NEEDED FOR ANXIETY. MAX DAILY AMOUNT: 1.5 MG 6/9/20   Codey Ray MD   mirtazapine (REMERON) 30 mg tablet TAKE 1 TABLET BY MOUTH  NIGHTLY 6/5/20   Codey Ray MD   metFORMIN (GLUCOPHAGE) 500 mg tablet TAKE 2 TABLETS BY MOUTH  TWICE A DAY WITH MEALS 4/27/20   Codey Ray MD   albuterol (PROVENTIL HFA, VENTOLIN HFA, PROAIR HFA) 90 mcg/actuation inhaler Take 2 Puffs by inhalation every four (4) hours as needed for Wheezing. 3/17/20   Codey Ray MD   venlafaxine-SR [facility] 150 mg capsule Take 1 Cap by mouth daily. 11/11/19   Codey Ray MD   omeprazole (PRILOSEC) 40 mg capsule Take 1 Cap by mouth daily. 6/14/19   David Gutierrez MD   simvastatin (ZOCOR) 40 mg tablet Take 1 Tab by mouth nightly. 5/13/19   Codey Ray MD   sildenafil citrate (VIAGRA) 50 mg tablet Take 1 Tab by mouth as needed.  11/9/18   Codey Ray MD   aspirin delayed-release 81 mg tablet Take by mouth daily. Provider, Historical     Patient Active Problem List   Diagnosis Code    Essential hypertension I10    Controlled type 2 diabetes mellitus without complication, without long-term current use of insulin (HCC) E11.9    Hyperlipidemia E78.5    Chronic left shoulder pain M25.512, G89.29    Pain management contract agreement Z02.89    ACP (advance care planning) Z71.89    Type 2 diabetes mellitus with nephropathy ([facility] E11.21    Recurrent depression ([facility] F33.9    TACHO (generalized anxiety disorder) F41.1    Palpitations R00.2     Past Medical History:   Diagnosis Date    Depression     Diabetes ([facility]     Hypercholesterolemia     Hypertension        Review of Systems   Constitutional: Negative for chills and fever. HENT: Positive for congestion. Negative for sinus pain and sore throat. Respiratory: Negative for cough and shortness of breath. Cardiovascular: Negative for chest pain and palpitations. Skin: Negative for rash. Neurological: Negative. Psychiatric/Behavioral: Negative. No flowsheet data found. Jose Ta, who was evaluated through a patient-initiated, synchronous (real-time) audio only encounter, and/or her healthcare decision maker, is aware that it is a billable service, with coverage as determined by his insurance carrier. He provided verbal consent to proceed: Yes. He has not had a related appointment within my department in the past 7 days or scheduled within the next 24 hours.       Total Time: minutes: 11-20 minutes    Daren Mackey MD

## 2020-08-03 DIAGNOSIS — E11.9 CONTROLLED TYPE 2 DIABETES MELLITUS WITHOUT COMPLICATION, WITHOUT LONG-TERM CURRENT USE OF INSULIN (HCC): ICD-10-CM

## 2020-08-05 RX ORDER — METFORMIN HYDROCHLORIDE 500 MG/1
TABLET ORAL
Qty: 360 TAB | Refills: 3 | Status: SHIPPED | OUTPATIENT
Start: 2020-08-05 | End: 2021-06-01

## 2020-08-07 DIAGNOSIS — F41.1 GAD (GENERALIZED ANXIETY DISORDER): ICD-10-CM

## 2020-08-07 RX ORDER — ALPRAZOLAM 0.5 MG/1
TABLET ORAL
Qty: 90 TAB | Refills: 1 | Status: CANCELLED | OUTPATIENT
Start: 2020-08-07

## 2020-08-07 NOTE — TELEPHONE ENCOUNTER
Patient requesting refills on his Xanax, he was last seen via VV on 7/20/2020 for HTN, please advise

## 2020-08-10 ENCOUNTER — PATIENT MESSAGE (OUTPATIENT)
Dept: FAMILY MEDICINE CLINIC | Age: 75
End: 2020-08-10

## 2020-08-10 DIAGNOSIS — F33.1 MDD (MAJOR DEPRESSIVE DISORDER), RECURRENT EPISODE, MODERATE (HCC): Primary | ICD-10-CM

## 2020-08-10 DIAGNOSIS — F41.1 GAD (GENERALIZED ANXIETY DISORDER): ICD-10-CM

## 2020-08-10 NOTE — TELEPHONE ENCOUNTER
Patient called back  Today in ref to a refill for his  ALPRAZolam (XANAX) 0.5 mg tablet. Please review and advise and f/u with the patient. [phone]

## 2020-08-11 RX ORDER — ALPRAZOLAM 0.5 MG/1
0.5 TABLET ORAL
Qty: 60 TAB | Refills: 0 | Status: SHIPPED | OUTPATIENT
Start: 2020-08-11 | End: 2020-09-03 | Stop reason: SDUPTHER

## 2020-08-11 RX ORDER — ALPRAZOLAM 0.5 MG/1
TABLET ORAL
Qty: 90 TAB | Refills: 0 | OUTPATIENT
Start: 2020-08-11

## 2020-08-11 NOTE — TELEPHONE ENCOUNTER
Received a fax from [phone] [facility] requesting clarification on the Xanax 0.5mg. They received two directions in the same line, one po bid and one po tid. Please correct and re send to the pharmacy.

## 2020-08-12 NOTE — TELEPHONE ENCOUNTER
Called the pharmacy after consulting with Dr. Pop Alanis. Informed pharmacist that Dr. Pop Alanis decreased xanax to 1 po bid. Called patient and informed him of the new directions. He would like Dr. Pop Alanis to call him directly to discuss the reason for the change, as it was not discussed in an appointment previously, it was just changed. I informed him that Dr. Pop Alanis stated he would call him 8/13/20, in the morning. Pt expressed understanding and was thankful for the return call.

## 2020-08-14 NOTE — TELEPHONE ENCOUNTER
I called pt to tell that he has to decrease to bid on xanax and be referred to psychiatry. There was no answer on his phone.

## 2020-08-18 ENCOUNTER — VIRTUAL VISIT (OUTPATIENT)
Dept: FAMILY MEDICINE CLINIC | Age: 75
End: 2020-08-18

## 2020-08-18 DIAGNOSIS — F41.1 GAD (GENERALIZED ANXIETY DISORDER): Primary | ICD-10-CM

## 2020-08-18 NOTE — PROGRESS NOTES
Ras Granados is a 70 y.o. male, evaluated via audio-only technology on 8/18/2020 for No chief complaint on file. .    Assessment & Plan:   Diagnoses and all orders for this visit:    1. TACHO (generalized anxiety disorder)      Discussed in detail that pt will see psychiatrist for meds discussion. 12  Subjective:       Prior to Admission medications    Medication Sig Start Date End Date Taking? Authorizing Provider   ALPRAZolam Chilango Betts) 0.5 mg tablet Take 1 Tab by mouth two (2) times daily as needed for Anxiety. Max Daily Amount: 1 mg. TAKE 1 TABLET BY MOUTH THREE TIMES DAILY AS NEEDED FOR ANXIETY. MAX DAILY AMOUNT: 1.5 MG 8/11/20   Codey Ray MD   metFORMIN (GLUCOPHAGE) 500 mg tablet TAKE 2 TABLETS BY MOUTH  TWICE DAILY WITH MEALS 8/5/20   Codey Ray MD   simvastatin (ZOCOR) 40 mg tablet Take 1 Tab by mouth nightly. 7/14/20   Codey Ray MD   glipiZIDE SR (GLUCOTROL XL) 10 mg CR tablet Take 1 Tab by mouth daily. 7/14/20   Codey Ray MD   fenofibrate (LOFIBRA) 160 mg tablet Take 1 Tab by mouth daily. 7/14/20   Codey Ray MD   simvastatin (ZOCOR) 40 mg tablet Take 1 Tab by mouth nightly. 7/14/20   Codey Ray MD   atenoloL (TENORMIN) 100 mg tablet Take 1 Tab by mouth daily. 7/14/20   Codey Ray MD   benazepriL (LOTENSIN) 40 mg tablet Take 1 Tab by mouth daily. 7/14/20   Codey Ray MD   mirtazapine (REMERON) 30 mg tablet TAKE 1 TABLET BY MOUTH  NIGHTLY 6/5/20   Codey Ray MD   albuterol (PROVENTIL HFA, VENTOLIN HFA, PROAIR HFA) 90 mcg/actuation inhaler Take 2 Puffs by inhalation every four (4) hours as needed for Wheezing. 3/17/20   Codey Ray MD   venlafaxine-SR [facility] 150 mg capsule Take 1 Cap by mouth daily. 11/11/19   Codey Ray MD   omeprazole (PRILOSEC) 40 mg capsule Take 1 Cap by mouth daily. 6/14/19   David Gutierrez MD   simvastatin (ZOCOR) 40 mg tablet Take 1 Tab by mouth nightly.  5/13/19   Codey Ray MD   sildenafil citrate (VIAGRA) 50 mg tablet Take 1 Tab by mouth as needed. 11/9/18   Glenny Tamayo MD   aspirin delayed-release 81 mg tablet Take  by mouth daily. Provider, Historical     Patient Active Problem List   Diagnosis Code    Essential hypertension I10    Controlled type 2 diabetes mellitus without complication, without long-term current use of insulin ([facility]) E11.9    Hyperlipidemia E78.5    Chronic left shoulder pain M25.512, G89.29    Pain management contract agreement Z02.89    ACP (advance care planning) Z71.89    Type 2 diabetes mellitus with nephropathy ([facility] E11.21    Recurrent depression ([facility] F33.9    TACHO (generalized anxiety disorder) F41.1    Palpitations R00.2     Past Medical History:   Diagnosis Date    Depression     Diabetes ([facility]     Hypercholesterolemia     Hypertension        Review of Systems   Constitutional: Negative for chills and fever. Respiratory: Negative for cough and shortness of breath. Cardiovascular: Negative for chest pain and palpitations. Psychiatric/Behavioral: Negative for depression, hallucinations, memory loss, substance abuse and suicidal ideas. The patient is not nervous/anxious and does not have insomnia. No flowsheet data found. Daryl Hope, who was evaluated through a patient-initiated, synchronous (real-time) audio only encounter, and/or her healthcare decision maker, is aware that it is a billable service, with coverage as determined by his insurance carrier. He provided verbal consent to proceed: Yes. He has not had a related appointment within my department in the past 7 days or scheduled within the next 24 hours.       Total Time: minutes: 21-30 minutes    Edmund Collins MD

## 2020-08-28 DIAGNOSIS — F41.1 GAD (GENERALIZED ANXIETY DISORDER): ICD-10-CM

## 2020-08-28 DIAGNOSIS — F33.1 MDD (MAJOR DEPRESSIVE DISORDER), RECURRENT EPISODE, MODERATE (HCC): ICD-10-CM

## 2020-08-31 RX ORDER — MIRTAZAPINE 30 MG/1
TABLET, FILM COATED ORAL
Qty: 90 TAB | Refills: 3 | Status: SHIPPED | OUTPATIENT
Start: 2020-08-31

## 2020-09-02 ENCOUNTER — TELEPHONE (OUTPATIENT)
Dept: FAMILY MEDICINE CLINIC | Age: 75
End: 2020-09-02

## 2020-09-02 NOTE — TELEPHONE ENCOUNTER
Patient called and asked to speak with me regarding frustration with the Xanax dosage being decreased recently from TID to BID. He is frustrated because it wasn't explained why the dosage was changed on 8/11 and therefore made the virtual appointment on 8/18 to discuss. Mr. Joao Goldman states at that time Dr. Joellen Snow had informed him he can't continue to prescribe the Xanax three times a day and referred him to psychiatry which he is waiting to hear on and aware that it is being worked on but also understands this wont be overnight. Some history he shared is that he has taken the Xanax TID along with an Ace inhibitor and beta blocker for many years since the 1970's. He states he has always struggled with TACHO which he states directly affects his blood pressure. A doctor many years ago suggested introducing the Xanax TID along with the blood pressure meds and it worked and has since then. He said when he first started coming to see Dr. Joellen Snow he referred him see Dr. Simon Thompson who ok'ed and agreed that he needed that dosage of Xanax. Thereafter Dr. Joellen Snow has prescribed it TID until 8/11. Mr. Joao Goldman takes his blood pressure twice every day and states it is now running 166/90 and 150/88 since being on the twice daily Xanax and his heart rate is about 80-85. He states around late July while on the Xanax TID his blood pressure was never over 140/85. I let Mr. Davenport know that although I can not change Dr. Gino Reyna medical decision making I will talk with him and explain everything that Mr. Davenport explained to me. I made very clear that I wasn't speaking for Dr. Joellen Snow but thinking that since it has been years now since he saw psychiatry that Dr. Joellen Snow wants him re-evaluated to make sure this dosing is still appropriate as many things can change with us over the years. Mr. Joao Goldman said that is completely understandable and will be more than willing to go see psychiatry.   His concern right now is that he only has 10 days left of the Xanax at the twice daily dosing so he will need it refilled but would like it to be at the three times day dosing as he said at his age he is not interested in taking the risk of trying to wean down and risk his blood pressure as his brother passed away of a heart attach at age 61 and he is now 70. I let Mr. Davenport know I would look into this and talk to Dr. Hosey Dakin and let him know. He is aware that it may not be today but I would try to get back with him before the end of the week even though Dr. Hosey Dakin is out of the office. Mr. Kay Symonefeli was ok with this and verbalized understanding. Mirna Mcdermott talked to him shortly after our conversation about referral also.

## 2020-09-03 DIAGNOSIS — F41.1 GAD (GENERALIZED ANXIETY DISORDER): ICD-10-CM

## 2020-09-03 RX ORDER — ALPRAZOLAM 0.5 MG/1
0.5 TABLET ORAL 2 TIMES DAILY
Qty: 60 TAB | Refills: 0 | Status: SHIPPED | OUTPATIENT
Start: 2020-09-03

## 2020-09-03 RX ORDER — ALPRAZOLAM 0.5 MG/1
0.5 TABLET ORAL 2 TIMES DAILY
Qty: 60 TAB | Refills: 0 | Status: SHIPPED | OUTPATIENT
Start: 2020-09-03 | End: 2020-09-03 | Stop reason: SDUPTHER

## 2020-09-03 NOTE — TELEPHONE ENCOUNTER
Patient returned my call and I explained that I was able to speak with Dr. Pop Alanis today and the refill was sent in today by Dr. Pop Alanis but for twice daily. I explained to him that Dr. Pop Alanis did not feel comfortable continuing to prescribe the Xanax three times daily but that he would continue to prescribe it twice daily until he can get in with psych at which time Dr. Pop Alanis would like them to take over management of this. Mr. Leilani Maurer responded to me that he felt that was reasonable as long as he wasn't going to cut him off completely. I explained to him that Dr. Pop Alanis said he would not and Dr. Pop Alanis also understood it takes time to get into psych. I also provided Mr. Leilani Maurer with their phone #.  I made sure he also understood that Dr. Pop Alanis only prescribed a months supply so if in a month he had not seen psych he needed to make sure he calls in at least a weeks advance of running out of medication. I let him know there are a couple other weeks that Dr. Pop Alanis will be out so it would be very important to now wait until the last minute. Mr. Leilani Maurer seemed ok and satisfied and verbalized understanding. He thanked me and the call was ended. I did let him know if he would like to ask for me next time since I was aware of what Dr. Pop Alanis and I talked about that was fine to do so.

## 2020-09-03 NOTE — TELEPHONE ENCOUNTER
I was able to speak with Dr. Bertha Jamison about the patient's concerns today. Dr. Bertha Jamison told me that he is not comfortable filling the Xanax three times daily. He stated at his age he feels that is too much for him to continue taking. After patient was upset about a lack of communication when the dosage was changed without him knowing a virtual appointment was scheduled on 8/18 and Dr. Bertha Jamison did document in that note \"Discussed in detail that pt will see psychiatrist for meds discussion. \"    Dr. Bertha Jamison did tell me and sent in a refill of the Xanax twice daily so he would not run out. I asked Dr. Bertha Jamison if his intention was to continue to supply the Xanax twice daily until he can get in with psych and then for them to take over prescribing this medication and he stated \"yes\". I called patient and left message on machine for him to call the office back.

## 2020-09-07 DIAGNOSIS — E78.00 PURE HYPERCHOLESTEROLEMIA: ICD-10-CM

## 2020-09-07 DIAGNOSIS — I10 ESSENTIAL HYPERTENSION: ICD-10-CM

## 2020-09-07 DIAGNOSIS — E78.5 HYPERLIPIDEMIA, UNSPECIFIED HYPERLIPIDEMIA TYPE: ICD-10-CM

## 2020-09-08 RX ORDER — FENOFIBRATE 160 MG/1
TABLET ORAL
Qty: 90 TAB | Refills: 3 | Status: SHIPPED | OUTPATIENT
Start: 2020-09-08

## 2020-09-08 RX ORDER — BENAZEPRIL HYDROCHLORIDE 40 MG/1
TABLET ORAL
Qty: 90 TAB | Refills: 3 | Status: SHIPPED | OUTPATIENT
Start: 2020-09-08

## 2020-09-08 RX ORDER — SIMVASTATIN 40 MG/1
TABLET, FILM COATED ORAL
Qty: 90 TAB | Refills: 3 | Status: SHIPPED | OUTPATIENT
Start: 2020-09-08

## 2020-09-08 RX ORDER — ATENOLOL 100 MG/1
TABLET ORAL
Qty: 90 TAB | Refills: 3 | Status: SHIPPED | OUTPATIENT
Start: 2020-09-08

## 2020-09-08 RX ORDER — GLIPIZIDE 10 MG/1
TABLET, FILM COATED, EXTENDED RELEASE ORAL
Qty: 90 TAB | Refills: 3 | Status: SHIPPED | OUTPATIENT
Start: 2020-09-08

## 2021-02-26 ENCOUNTER — TELEPHONE (OUTPATIENT)
Dept: FAMILY MEDICINE CLINIC | Age: 76
End: 2021-02-26

## 2021-02-26 NOTE — TELEPHONE ENCOUNTER
Patient is due for f/u. We can schedule labs first and then virtual f/u if pt would prefer or schedule in office visit with labs same day, pt's preference. Letter sent also.

## 2021-02-26 NOTE — LETTER
2/26/2021 12:38 PM 
 
Mr. Amisha Ray [facility] Dear Mr. Paredes Nirali: 
 
[facility] missed you! Please call our office at [phone] and schedule a follow up appointment for your continued care. Sincerely, Adryan Horner MD

## 2021-05-30 DIAGNOSIS — E11.9 CONTROLLED TYPE 2 DIABETES MELLITUS WITHOUT COMPLICATION, WITHOUT LONG-TERM CURRENT USE OF INSULIN (HCC): ICD-10-CM

## 2021-06-01 RX ORDER — METFORMIN HYDROCHLORIDE 500 MG/1
TABLET ORAL
Qty: 360 TABLET | Refills: 3 | Status: SHIPPED | OUTPATIENT
Start: 2021-06-01

## 2021-07-18 DIAGNOSIS — F41.1 GAD (GENERALIZED ANXIETY DISORDER): ICD-10-CM

## 2021-07-18 DIAGNOSIS — F33.1 MDD (MAJOR DEPRESSIVE DISORDER), RECURRENT EPISODE, MODERATE (HCC): ICD-10-CM

## 2021-07-19 RX ORDER — MIRTAZAPINE 30 MG/1
TABLET, FILM COATED ORAL
Qty: 90 TABLET | Refills: 3 | OUTPATIENT
Start: 2021-07-19

## 2021-07-28 DIAGNOSIS — E78.00 PURE HYPERCHOLESTEROLEMIA: ICD-10-CM

## 2021-07-28 DIAGNOSIS — I10 ESSENTIAL HYPERTENSION: ICD-10-CM

## 2021-07-29 RX ORDER — SIMVASTATIN 40 MG/1
TABLET, FILM COATED ORAL
Qty: 90 TABLET | Refills: 3 | OUTPATIENT
Start: 2021-07-29

## 2021-07-29 RX ORDER — BENAZEPRIL HYDROCHLORIDE 40 MG/1
TABLET ORAL
Qty: 90 TABLET | Refills: 3 | OUTPATIENT
Start: 2021-07-29

## 2021-07-29 RX ORDER — FENOFIBRATE 160 MG/1
TABLET ORAL
Qty: 90 TABLET | Refills: 3 | OUTPATIENT
Start: 2021-07-29

## 2025-01-21 NOTE — PATIENT INSTRUCTIONS
Medicare Wellness Visit, Male The best way to live healthy is to have a lifestyle where you eat a well-balanced diet, exercise regularly, limit alcohol use, and quit all forms of tobacco/nicotine, if applicable. Regular preventive services are another way to keep healthy. Preventive services (vaccines, screening tests, monitoring & exams) can help personalize your care plan, which helps you manage your own care. Screening tests can find health problems at the earliest stages, when they are easiest to treat. [employer] follows the current, evidence-based guidelines published by the [facility] ([facility]) when recommending preventive services for our patients. Because we follow these guidelines, sometimes recommendations change over time as research supports it. (For example, a prostate screening blood test is no longer routinely recommended for men with no symptoms.) Of course, you and your doctor may decide to screen more often for some diseases, based on your risk and co-morbidities (chronic disease you are already diagnosed with). Preventive services for you include: - Medicare offers their members a free annual wellness visit, which is time for you and your primary care provider to discuss and plan for your preventive service needs. Take advantage of this benefit every year! 
-All adults over age 72 should receive the recommended pneumonia vaccines. Current USPSTF guidelines recommend a series of two vaccines for the best pneumonia protection.  
-All adults should have a flu vaccine yearly and an ECG. All adults age 61 and older should receive a shingles vaccine once in their lifetime.   
-All adults age 38-68 who are overweight should have a diabetes screening test once every three years. -Other screening tests & preventive services for persons with diabetes include: an eye exam to screen for diabetic retinopathy, a kidney function test, a foot exam, and stricter control over your cholesterol.  
-Cardiovascular screening for adults with routine risk involves an electrocardiogram (ECG) at intervals determined by the provider.  
-Colorectal cancer screening should be done for adults age 54-65 with no increased risk factors for colorectal cancer. There are a number of acceptable methods of screening for this type of cancer. Each test has its own benefits and drawbacks. Discuss with your provider what is most appropriate for you during your annual wellness visit. The different tests include: colonoscopy (considered the best screening method), a fecal occult blood test, a fecal DNA test, and sigmoidoscopy. 
-All adults born between [facility] should be screened once for Hepatitis C. 
-An Abdominal Aortic Aneurysm (AAA) Screening is recommended for men age 73-68 who has ever smoked in their lifetime. Here is a list of your current Health Maintenance items (your personalized list of preventive services) with a due date: 
Health Maintenance Due Topic Date Due  
 Diabetic Foot Care  01/03/1955  Eye Exam  01/03/1955  
 DTaP/Tdap/Td  (1 - Tdap) 01/03/1968  Shingles Vaccine (1 of 2) 01/03/1997  Stool testing for trace blood  01/03/1997  Glaucoma Screening   01/03/2012  Pneumococcal Vaccine (1 of 2 - PCV13) 01/03/2012  AAA Screening  01/03/2012 [facility] Annual Well Visit  10/12/2018 High Blood Pressure: Care Instructions Overview It's normal for blood pressure to go up and down throughout the day. But if it stays up, you have high blood pressure. Another name for high blood pressure is hypertension. Despite what a lot of people think, high blood pressure usually doesn't cause headaches or make you feel dizzy or lightheaded. It usually has no symptoms. But it does increase your risk of stroke, heart attack, and other problems. You and your doctor will talk about your risks of these problems based on your blood pressure. Your doctor will give you a goal for your blood pressure. Your goal will be based on your health and your age. Lifestyle changes, such as eating healthy and being active, are always important to help lower blood pressure. You might also take medicine to reach your blood pressure goal. 
Follow-up care is a key part of your treatment and safety. Be sure to make and go to all appointments, and call your doctor if you are having problems. It's also a good idea to know your test results and keep a list of the medicines you take. How can you care for yourself at home? Medical treatment · If you stop taking your medicine, your blood pressure will go back up. You may take one or more types of medicine to lower your blood pressure. Be safe with medicines. Take your medicine exactly as prescribed. Call your doctor if you think you are having a problem with your medicine. · Talk to your doctor before you start taking aspirin every day. Aspirin can help certain people lower their risk of a heart attack or stroke. But taking aspirin isn't right for everyone, because it can cause serious bleeding. · See your doctor regularly. You may need to see the doctor more often at first or until your blood pressure comes down. · If you are taking blood pressure medicine, talk to your doctor before you take decongestants or anti-inflammatory medicine, such as ibuprofen. Some of these medicines can raise blood pressure. · Learn how to check your blood pressure at home. Lifestyle changes Detail Level: Zone · Stay at a healthy weight. This is especially important if you put on weight around the waist. Losing even 10 pounds can help you lower your blood pressure. · If your doctor recommends it, get more exercise. Walking is a good choice. Bit by bit, increase the amount you walk every day. Try for at least 30 minutes on most days of the week. You also may want to swim, bike, or do other activities. · Avoid or limit alcohol. Talk to your doctor about whether you can drink any alcohol. · Try to limit how much sodium you eat to less than 2,300 milligrams (mg) a day. Your doctor may ask you to try to eat less than 1,500 mg a day. · Eat plenty of fruits (such as bananas and oranges), vegetables, legumes, whole grains, and low-fat dairy products. · Lower the amount of saturated fat in your diet. Saturated fat is found in animal products such as milk, cheese, and meat. Limiting these foods may help you lose weight and also lower your risk for heart disease. · Do not smoke. Smoking increases your risk for heart attack and stroke. If you need help quitting, talk to your doctor about stop-smoking programs and medicines. These can increase your chances of quitting for good. When should you call for help? Call 911 anytime you think you may need emergency care. This may mean having symptoms that suggest that your blood pressure is causing a serious heart or blood vessel problem. Your blood pressure may be over 180/120. 
 For example, call 911 if: 
  · You have symptoms of a heart attack. These may include: 
? Chest pain or pressure, or a strange feeling in the chest. 
? Sweating. ? Shortness of breath. ? Nausea or vomiting. ? Pain, pressure, or a strange feeling in the back, neck, jaw, or upper belly or in one or both shoulders or arms. ? Lightheadedness or sudden weakness. ? A fast or irregular heartbeat.  
  · You have symptoms of a stroke. These may include: ? Sudden numbness, tingling, weakness, or loss of movement in your face, arm, or leg, especially on only one side of your body. ? Sudden vision changes. ? Sudden trouble speaking. ? Sudden confusion or trouble understanding simple statements. ? Sudden problems with walking or balance. ? A sudden, severe headache that is different from past headaches.  
  · You have severe back or belly pain.  
 Do not wait until your blood pressure comes down on its own. Get help right away. 
 Call your doctor now or seek immediate care if: 
  · Your blood pressure is much higher than normal (such as 180/120 or higher), but you don't have symptoms.  
  · You think high blood pressure is causing symptoms, such as: 
? Severe headache. 
? Blurry vision.  
 Watch closely for changes in your health, and be sure to contact your doctor if: 
  · Your blood pressure measures higher than your doctor recommends at least 2 times. That means the top number is higher or the bottom number is higher, or both.  
  · You think you may be having side effects from your blood pressure medicine. Where can you learn more? Go to http://dariusz-stanley.info/. Enter O883 in the search box to learn more about \"High Blood Pressure: Care Instructions. \" Current as of: July 22, 2018 Content Version: 11.9 © 4510-2226 [employer]. Care instructions adapted under license by [employer] (which disclaims liability or warranty for this information). If you have questions about a medical condition or this instruction, always ask your healthcare professional. [place] [address] any warranty or liability for your use of this information.
